# Patient Record
Sex: FEMALE | Race: WHITE | HISPANIC OR LATINO | Employment: UNEMPLOYED | URBAN - NONMETROPOLITAN AREA
[De-identification: names, ages, dates, MRNs, and addresses within clinical notes are randomized per-mention and may not be internally consistent; named-entity substitution may affect disease eponyms.]

---

## 2021-08-27 ENCOUNTER — HOSPITAL ENCOUNTER (INPATIENT)
Facility: HOSPITAL | Age: 81
LOS: 28 days | Discharge: HOME HEALTH | End: 2021-09-24
Attending: INTERNAL MEDICINE | Admitting: INTERNAL MEDICINE

## 2021-08-27 ENCOUNTER — PREP FOR SURGERY (OUTPATIENT)
Dept: OTHER | Facility: HOSPITAL | Age: 81
End: 2021-08-27

## 2021-08-27 DIAGNOSIS — I63.9 CVA (CEREBRAL VASCULAR ACCIDENT) (HCC): ICD-10-CM

## 2021-08-27 DIAGNOSIS — I63.312 CEREBROVASCULAR ACCIDENT (CVA) DUE TO THROMBOSIS OF LEFT MIDDLE CEREBRAL ARTERY (HCC): ICD-10-CM

## 2021-08-27 DIAGNOSIS — I63.9 CVA (CEREBRAL VASCULAR ACCIDENT) (HCC): Primary | ICD-10-CM

## 2021-08-27 LAB
GLUCOSE BLDC GLUCOMTR-MCNC: 205 MG/DL (ref 70–130)
GLUCOSE BLDC GLUCOMTR-MCNC: 261 MG/DL (ref 70–130)
GLUCOSE BLDC GLUCOMTR-MCNC: 294 MG/DL (ref 70–130)

## 2021-08-27 PROCEDURE — 82962 GLUCOSE BLOOD TEST: CPT

## 2021-08-27 PROCEDURE — 63710000001 INSULIN DETEMIR PER 5 UNITS: Performed by: INTERNAL MEDICINE

## 2021-08-27 PROCEDURE — 63710000001 INSULIN ASPART PER 5 UNITS: Performed by: INTERNAL MEDICINE

## 2021-08-27 RX ORDER — BISACODYL 10 MG
10 SUPPOSITORY, RECTAL RECTAL DAILY PRN
Status: CANCELLED | OUTPATIENT
Start: 2021-08-27

## 2021-08-27 RX ORDER — KETOTIFEN FUMARATE 0.35 MG/ML
1 SOLUTION/ DROPS OPHTHALMIC EVERY 8 HOURS
Status: CANCELLED | OUTPATIENT
Start: 2021-08-27

## 2021-08-27 RX ORDER — ASPIRIN 81 MG/1
81 TABLET, CHEWABLE ORAL DAILY
Status: DISPENSED | OUTPATIENT
Start: 2021-08-27 | End: 2021-09-01

## 2021-08-27 RX ORDER — ASCORBIC ACID 500 MG
500 TABLET ORAL DAILY
Status: CANCELLED | OUTPATIENT
Start: 2021-08-27

## 2021-08-27 RX ORDER — MULTIPLE VITAMINS W/ MINERALS TAB 9MG-400MCG
1 TAB ORAL DAILY
Status: DISCONTINUED | OUTPATIENT
Start: 2021-08-27 | End: 2021-09-24 | Stop reason: HOSPADM

## 2021-08-27 RX ORDER — CHOLECALCIFEROL (VITAMIN D3) 125 MCG
10 CAPSULE ORAL NIGHTLY
Status: CANCELLED | OUTPATIENT
Start: 2021-08-27

## 2021-08-27 RX ORDER — MULTIPLE VITAMINS W/ MINERALS TAB 9MG-400MCG
1 TAB ORAL DAILY
Status: CANCELLED | OUTPATIENT
Start: 2021-08-27

## 2021-08-27 RX ORDER — ONDANSETRON 4 MG/1
4 TABLET, FILM COATED ORAL EVERY 6 HOURS PRN
Status: CANCELLED | OUTPATIENT
Start: 2021-08-27

## 2021-08-27 RX ORDER — PANTOPRAZOLE SODIUM 40 MG/1
40 TABLET, DELAYED RELEASE ORAL
Status: DISCONTINUED | OUTPATIENT
Start: 2021-08-28 | End: 2021-09-24 | Stop reason: HOSPADM

## 2021-08-27 RX ORDER — AMOXICILLIN 250 MG
1 CAPSULE ORAL 2 TIMES DAILY
Status: CANCELLED | OUTPATIENT
Start: 2021-08-27

## 2021-08-27 RX ORDER — AMLODIPINE BESYLATE 10 MG/1
10 TABLET ORAL 2 TIMES DAILY
Status: CANCELLED | OUTPATIENT
Start: 2021-08-27

## 2021-08-27 RX ORDER — CHOLECALCIFEROL (VITAMIN D3) 125 MCG
10 CAPSULE ORAL NIGHTLY
Status: DISCONTINUED | OUTPATIENT
Start: 2021-08-27 | End: 2021-09-24 | Stop reason: HOSPADM

## 2021-08-27 RX ORDER — ATORVASTATIN CALCIUM 40 MG/1
80 TABLET, FILM COATED ORAL NIGHTLY
Status: CANCELLED | OUTPATIENT
Start: 2021-08-27

## 2021-08-27 RX ORDER — LEVOTHYROXINE SODIUM 0.05 MG/1
50 TABLET ORAL
Status: CANCELLED | OUTPATIENT
Start: 2021-08-27

## 2021-08-27 RX ORDER — CALCIUM CARBONATE 200(500)MG
1 TABLET,CHEWABLE ORAL 2 TIMES DAILY PRN
Status: CANCELLED | OUTPATIENT
Start: 2021-08-27

## 2021-08-27 RX ORDER — ATORVASTATIN CALCIUM 40 MG/1
80 TABLET, FILM COATED ORAL NIGHTLY
Status: DISCONTINUED | OUTPATIENT
Start: 2021-08-27 | End: 2021-09-24 | Stop reason: HOSPADM

## 2021-08-27 RX ORDER — CARBOXYMETHYLCELLULOSE SODIUM 5 MG/ML
2 SOLUTION/ DROPS OPHTHALMIC 3 TIMES DAILY PRN
Status: DISCONTINUED | OUTPATIENT
Start: 2021-08-27 | End: 2021-09-24 | Stop reason: HOSPADM

## 2021-08-27 RX ORDER — CARBOXYMETHYLCELLULOSE SODIUM 5 MG/ML
2 SOLUTION/ DROPS OPHTHALMIC 3 TIMES DAILY PRN
Status: CANCELLED | OUTPATIENT
Start: 2021-08-27

## 2021-08-27 RX ORDER — INSULIN DETEMIR 100 [IU]/ML
28 INJECTION, SOLUTION SUBCUTANEOUS
COMMUNITY
End: 2021-09-24 | Stop reason: HOSPADM

## 2021-08-27 RX ORDER — AMLODIPINE BESYLATE 10 MG/1
10 TABLET ORAL 2 TIMES DAILY
Status: DISCONTINUED | OUTPATIENT
Start: 2021-08-27 | End: 2021-09-24 | Stop reason: HOSPADM

## 2021-08-27 RX ORDER — CLOPIDOGREL BISULFATE 75 MG/1
75 TABLET ORAL DAILY
Status: CANCELLED | OUTPATIENT
Start: 2021-08-27 | End: 2021-09-01

## 2021-08-27 RX ORDER — PHENOL 1.4 %
10 AEROSOL, SPRAY (ML) MUCOUS MEMBRANE
Status: ON HOLD | COMMUNITY
End: 2021-09-24 | Stop reason: SDUPTHER

## 2021-08-27 RX ORDER — HYDRALAZINE HYDROCHLORIDE 25 MG/1
25 TABLET, FILM COATED ORAL 3 TIMES DAILY
COMMUNITY
End: 2021-09-24 | Stop reason: HOSPADM

## 2021-08-27 RX ORDER — CHLORTHALIDONE 50 MG/1
25 TABLET ORAL DAILY
Status: CANCELLED | OUTPATIENT
Start: 2021-08-27

## 2021-08-27 RX ORDER — GLIPIZIDE 5 MG/1
5 TABLET ORAL 3 TIMES DAILY
COMMUNITY
End: 2021-09-24 | Stop reason: HOSPADM

## 2021-08-27 RX ORDER — DEXTROSE MONOHYDRATE 25 G/50ML
25 INJECTION, SOLUTION INTRAVENOUS
Status: CANCELLED | OUTPATIENT
Start: 2021-08-27

## 2021-08-27 RX ORDER — NICOTINE POLACRILEX 4 MG
15 LOZENGE BUCCAL
Status: CANCELLED | OUTPATIENT
Start: 2021-08-27

## 2021-08-27 RX ORDER — ASPIRIN 81 MG/1
81 TABLET ORAL DAILY
COMMUNITY
End: 2021-09-24 | Stop reason: HOSPADM

## 2021-08-27 RX ORDER — LANOLIN ALCOHOL/MO/W.PET/CERES
1000 CREAM (GRAM) TOPICAL DAILY
Status: CANCELLED | OUTPATIENT
Start: 2021-08-27

## 2021-08-27 RX ORDER — ASCORBIC ACID 500 MG
500 TABLET ORAL DAILY
Status: DISCONTINUED | OUTPATIENT
Start: 2021-08-27 | End: 2021-09-24 | Stop reason: HOSPADM

## 2021-08-27 RX ORDER — ONDANSETRON 4 MG/1
4 TABLET, FILM COATED ORAL EVERY 6 HOURS PRN
Status: DISCONTINUED | OUTPATIENT
Start: 2021-08-27 | End: 2021-09-24 | Stop reason: HOSPADM

## 2021-08-27 RX ORDER — ROSUVASTATIN CALCIUM 20 MG/1
20 TABLET, COATED ORAL DAILY
COMMUNITY
End: 2021-09-24 | Stop reason: HOSPADM

## 2021-08-27 RX ORDER — LANOLIN ALCOHOL/MO/W.PET/CERES
1000 CREAM (GRAM) TOPICAL DAILY
Status: DISCONTINUED | OUTPATIENT
Start: 2021-08-27 | End: 2021-09-24 | Stop reason: HOSPADM

## 2021-08-27 RX ORDER — CARVEDILOL 6.25 MG/1
12.5 TABLET ORAL 2 TIMES DAILY WITH MEALS
Status: CANCELLED | OUTPATIENT
Start: 2021-08-27

## 2021-08-27 RX ORDER — NICOTINE POLACRILEX 4 MG
15 LOZENGE BUCCAL
Status: DISCONTINUED | OUTPATIENT
Start: 2021-08-27 | End: 2021-09-24 | Stop reason: HOSPADM

## 2021-08-27 RX ORDER — KETOTIFEN FUMARATE 0.35 MG/ML
1 SOLUTION/ DROPS OPHTHALMIC 3 TIMES DAILY
Status: DISCONTINUED | OUTPATIENT
Start: 2021-08-27 | End: 2021-09-24 | Stop reason: HOSPADM

## 2021-08-27 RX ORDER — OMEPRAZOLE 20 MG/1
20 CAPSULE, DELAYED RELEASE ORAL DAILY
COMMUNITY
End: 2021-09-24 | Stop reason: HOSPADM

## 2021-08-27 RX ORDER — CLOPIDOGREL BISULFATE 75 MG/1
75 TABLET ORAL DAILY
Status: DISPENSED | OUTPATIENT
Start: 2021-08-27 | End: 2021-09-01

## 2021-08-27 RX ORDER — NAPROXEN SODIUM 220 MG
220 TABLET ORAL NIGHTLY PRN
COMMUNITY
End: 2021-09-24 | Stop reason: HOSPADM

## 2021-08-27 RX ORDER — LORATADINE 10 MG/1
10 TABLET ORAL DAILY
COMMUNITY
End: 2021-09-24 | Stop reason: HOSPADM

## 2021-08-27 RX ORDER — AMLODIPINE BESYLATE 10 MG/1
10 TABLET ORAL 2 TIMES DAILY
Status: ON HOLD | COMMUNITY
End: 2021-09-24 | Stop reason: SDUPTHER

## 2021-08-27 RX ORDER — LEVOTHYROXINE SODIUM 0.05 MG/1
50 TABLET ORAL
Status: DISCONTINUED | OUTPATIENT
Start: 2021-08-28 | End: 2021-09-24 | Stop reason: HOSPADM

## 2021-08-27 RX ORDER — LEVOTHYROXINE SODIUM 0.05 MG/1
50 TABLET ORAL DAILY
Status: ON HOLD | COMMUNITY
End: 2021-09-24 | Stop reason: SDUPTHER

## 2021-08-27 RX ORDER — POLYETHYLENE GLYCOL 3350 17 G/17G
17 POWDER, FOR SOLUTION ORAL DAILY PRN
Status: CANCELLED | OUTPATIENT
Start: 2021-08-27

## 2021-08-27 RX ORDER — ACETAMINOPHEN 325 MG/1
650 TABLET ORAL EVERY 4 HOURS PRN
Status: CANCELLED | OUTPATIENT
Start: 2021-08-27

## 2021-08-27 RX ORDER — ASPIRIN 81 MG/1
81 TABLET, CHEWABLE ORAL DAILY
Status: CANCELLED | OUTPATIENT
Start: 2021-08-27 | End: 2021-09-01

## 2021-08-27 RX ORDER — CHLORTHALIDONE 50 MG/1
25 TABLET ORAL DAILY
Status: DISCONTINUED | OUTPATIENT
Start: 2021-08-27 | End: 2021-09-24 | Stop reason: HOSPADM

## 2021-08-27 RX ORDER — DEXTROSE MONOHYDRATE 25 G/50ML
25 INJECTION, SOLUTION INTRAVENOUS
Status: DISCONTINUED | OUTPATIENT
Start: 2021-08-27 | End: 2021-09-24 | Stop reason: HOSPADM

## 2021-08-27 RX ORDER — CALCIUM CARBONATE 200(500)MG
1 TABLET,CHEWABLE ORAL 2 TIMES DAILY PRN
Status: DISCONTINUED | OUTPATIENT
Start: 2021-08-27 | End: 2021-09-24 | Stop reason: HOSPADM

## 2021-08-27 RX ORDER — AMOXICILLIN 250 MG
1 CAPSULE ORAL 2 TIMES DAILY
Status: DISCONTINUED | OUTPATIENT
Start: 2021-08-27 | End: 2021-09-24 | Stop reason: HOSPADM

## 2021-08-27 RX ORDER — BISACODYL 10 MG
10 SUPPOSITORY, RECTAL RECTAL DAILY PRN
Status: DISCONTINUED | OUTPATIENT
Start: 2021-08-27 | End: 2021-09-24 | Stop reason: HOSPADM

## 2021-08-27 RX ORDER — ACETAMINOPHEN 325 MG/1
650 TABLET ORAL EVERY 4 HOURS PRN
Status: DISCONTINUED | OUTPATIENT
Start: 2021-08-27 | End: 2021-09-24 | Stop reason: HOSPADM

## 2021-08-27 RX ORDER — PANTOPRAZOLE SODIUM 40 MG/1
40 TABLET, DELAYED RELEASE ORAL
Status: CANCELLED | OUTPATIENT
Start: 2021-08-27

## 2021-08-27 RX ORDER — CARVEDILOL 6.25 MG/1
12.5 TABLET ORAL 2 TIMES DAILY WITH MEALS
Status: DISCONTINUED | OUTPATIENT
Start: 2021-08-27 | End: 2021-09-24 | Stop reason: HOSPADM

## 2021-08-27 RX ORDER — POLYETHYLENE GLYCOL 3350 17 G/17G
17 POWDER, FOR SOLUTION ORAL DAILY PRN
Status: DISCONTINUED | OUTPATIENT
Start: 2021-08-27 | End: 2021-09-24 | Stop reason: HOSPADM

## 2021-08-27 RX ORDER — DOCUSATE SODIUM 100 MG/1
100 CAPSULE, LIQUID FILLED ORAL 2 TIMES DAILY
Status: ON HOLD | COMMUNITY
End: 2021-08-27

## 2021-08-27 RX ADMIN — INSULIN ASPART 3 UNITS: 100 INJECTION, SOLUTION INTRAVENOUS; SUBCUTANEOUS at 13:29

## 2021-08-27 RX ADMIN — INSULIN ASPART 4 UNITS: 100 INJECTION, SOLUTION INTRAVENOUS; SUBCUTANEOUS at 17:19

## 2021-08-27 RX ADMIN — ATORVASTATIN CALCIUM 80 MG: 40 TABLET, FILM COATED ORAL at 21:51

## 2021-08-27 RX ADMIN — INSULIN DETEMIR 20 UNITS: 100 INJECTION, SOLUTION SUBCUTANEOUS at 21:35

## 2021-08-27 RX ADMIN — DOCUSATE SODIUM 50 MG AND SENNOSIDES 8.6 MG 1 TABLET: 8.6; 5 TABLET, FILM COATED ORAL at 21:51

## 2021-08-27 RX ADMIN — KETOTIFEN FUMARATE 1 DROP: 0.35 SOLUTION/ DROPS OPHTHALMIC at 13:29

## 2021-08-27 RX ADMIN — CARVEDILOL 12.5 MG: 6.25 TABLET, FILM COATED ORAL at 17:19

## 2021-08-27 RX ADMIN — KETOTIFEN FUMARATE 1 DROP: 0.35 SOLUTION/ DROPS OPHTHALMIC at 21:51

## 2021-08-27 RX ADMIN — AMLODIPINE BESYLATE 10 MG: 10 TABLET ORAL at 21:51

## 2021-08-27 RX ADMIN — Medication 10 MG: at 21:50

## 2021-08-28 ENCOUNTER — APPOINTMENT (OUTPATIENT)
Dept: GENERAL RADIOLOGY | Facility: HOSPITAL | Age: 81
End: 2021-08-28

## 2021-08-28 LAB
ALBUMIN SERPL-MCNC: 2.88 G/DL (ref 3.5–5.2)
ALBUMIN/GLOB SERPL: 0.9 G/DL
ALP SERPL-CCNC: 89 U/L (ref 39–117)
ALT SERPL W P-5'-P-CCNC: 28 U/L (ref 1–33)
ANION GAP SERPL CALCULATED.3IONS-SCNC: 9.2 MMOL/L (ref 5–15)
AST SERPL-CCNC: 26 U/L (ref 1–32)
BASOPHILS # BLD AUTO: 0.1 10*3/MM3 (ref 0–0.2)
BASOPHILS NFR BLD AUTO: 1 % (ref 0–1.5)
BILIRUB SERPL-MCNC: 0.2 MG/DL (ref 0–1.2)
BUN SERPL-MCNC: 22 MG/DL (ref 8–23)
BUN/CREAT SERPL: 21.2 (ref 7–25)
CALCIUM SPEC-SCNC: 8.9 MG/DL (ref 8.6–10.5)
CHLORIDE SERPL-SCNC: 100 MMOL/L (ref 98–107)
CO2 SERPL-SCNC: 25.8 MMOL/L (ref 22–29)
CREAT SERPL-MCNC: 1.04 MG/DL (ref 0.57–1)
DEPRECATED RDW RBC AUTO: 44.4 FL (ref 37–54)
EOSINOPHIL # BLD AUTO: 0.53 10*3/MM3 (ref 0–0.4)
EOSINOPHIL NFR BLD AUTO: 5.2 % (ref 0.3–6.2)
ERYTHROCYTE [DISTWIDTH] IN BLOOD BY AUTOMATED COUNT: 14 % (ref 12.3–15.4)
GFR SERPL CREATININE-BSD FRML MDRD: 51 ML/MIN/1.73
GLOBULIN UR ELPH-MCNC: 3.1 GM/DL
GLUCOSE BLDC GLUCOMTR-MCNC: 161 MG/DL (ref 70–130)
GLUCOSE BLDC GLUCOMTR-MCNC: 237 MG/DL (ref 70–130)
GLUCOSE BLDC GLUCOMTR-MCNC: 264 MG/DL (ref 70–130)
GLUCOSE BLDC GLUCOMTR-MCNC: 304 MG/DL (ref 70–130)
GLUCOSE SERPL-MCNC: 299 MG/DL (ref 65–99)
HCT VFR BLD AUTO: 39.2 % (ref 34–46.6)
HGB BLD-MCNC: 13.2 G/DL (ref 12–15.9)
IMM GRANULOCYTES # BLD AUTO: 0.06 10*3/MM3 (ref 0–0.05)
IMM GRANULOCYTES NFR BLD AUTO: 0.6 % (ref 0–0.5)
LYMPHOCYTES # BLD AUTO: 2.33 10*3/MM3 (ref 0.7–3.1)
LYMPHOCYTES NFR BLD AUTO: 22.9 % (ref 19.6–45.3)
MAGNESIUM SERPL-MCNC: 2.4 MG/DL (ref 1.6–2.4)
MCH RBC QN AUTO: 30 PG (ref 26.6–33)
MCHC RBC AUTO-ENTMCNC: 33.7 G/DL (ref 31.5–35.7)
MCV RBC AUTO: 89.1 FL (ref 79–97)
MONOCYTES # BLD AUTO: 0.9 10*3/MM3 (ref 0.1–0.9)
MONOCYTES NFR BLD AUTO: 8.8 % (ref 5–12)
NEUTROPHILS NFR BLD AUTO: 6.27 10*3/MM3 (ref 1.7–7)
NEUTROPHILS NFR BLD AUTO: 61.5 % (ref 42.7–76)
NRBC BLD AUTO-RTO: 0 /100 WBC (ref 0–0.2)
PLATELET # BLD AUTO: 260 10*3/MM3 (ref 140–450)
PMV BLD AUTO: 10.4 FL (ref 6–12)
POTASSIUM SERPL-SCNC: 4.3 MMOL/L (ref 3.5–5.2)
PROT SERPL-MCNC: 6 G/DL (ref 6–8.5)
RBC # BLD AUTO: 4.4 10*6/MM3 (ref 3.77–5.28)
SODIUM SERPL-SCNC: 135 MMOL/L (ref 136–145)
WBC # BLD AUTO: 10.19 10*3/MM3 (ref 3.4–10.8)

## 2021-08-28 PROCEDURE — 97162 PT EVAL MOD COMPLEX 30 MIN: CPT

## 2021-08-28 PROCEDURE — 63710000001 INSULIN DETEMIR PER 5 UNITS: Performed by: INTERNAL MEDICINE

## 2021-08-28 PROCEDURE — 97530 THERAPEUTIC ACTIVITIES: CPT

## 2021-08-28 PROCEDURE — 83735 ASSAY OF MAGNESIUM: CPT | Performed by: INTERNAL MEDICINE

## 2021-08-28 PROCEDURE — 97166 OT EVAL MOD COMPLEX 45 MIN: CPT

## 2021-08-28 PROCEDURE — 63710000001 INSULIN ASPART PER 5 UNITS: Performed by: INTERNAL MEDICINE

## 2021-08-28 PROCEDURE — 74230 X-RAY XM SWLNG FUNCJ C+: CPT | Performed by: RADIOLOGY

## 2021-08-28 PROCEDURE — 85025 COMPLETE CBC W/AUTO DIFF WBC: CPT | Performed by: INTERNAL MEDICINE

## 2021-08-28 PROCEDURE — 74230 X-RAY XM SWLNG FUNCJ C+: CPT

## 2021-08-28 PROCEDURE — 82962 GLUCOSE BLOOD TEST: CPT

## 2021-08-28 PROCEDURE — 80053 COMPREHEN METABOLIC PANEL: CPT | Performed by: INTERNAL MEDICINE

## 2021-08-28 PROCEDURE — 97112 NEUROMUSCULAR REEDUCATION: CPT

## 2021-08-28 PROCEDURE — 97110 THERAPEUTIC EXERCISES: CPT

## 2021-08-28 PROCEDURE — 92611 MOTION FLUOROSCOPY/SWALLOW: CPT

## 2021-08-28 RX ORDER — BISACODYL 5 MG/1
10 TABLET, DELAYED RELEASE ORAL DAILY PRN
Status: DISCONTINUED | OUTPATIENT
Start: 2021-08-28 | End: 2021-09-24 | Stop reason: HOSPADM

## 2021-08-28 RX ADMIN — ATORVASTATIN CALCIUM 80 MG: 40 TABLET, FILM COATED ORAL at 21:32

## 2021-08-28 RX ADMIN — OXYCODONE HYDROCHLORIDE AND ACETAMINOPHEN 500 MG: 500 TABLET ORAL at 09:42

## 2021-08-28 RX ADMIN — CLOPIDOGREL 75 MG: 75 TABLET, FILM COATED ORAL at 09:42

## 2021-08-28 RX ADMIN — KETOTIFEN FUMARATE 1 DROP: 0.35 SOLUTION/ DROPS OPHTHALMIC at 21:33

## 2021-08-28 RX ADMIN — ASPIRIN 81 MG: 81 TABLET, CHEWABLE ORAL at 09:42

## 2021-08-28 RX ADMIN — INSULIN ASPART 5 UNITS: 100 INJECTION, SOLUTION INTRAVENOUS; SUBCUTANEOUS at 11:48

## 2021-08-28 RX ADMIN — INSULIN ASPART 2 UNITS: 100 INJECTION, SOLUTION INTRAVENOUS; SUBCUTANEOUS at 09:41

## 2021-08-28 RX ADMIN — INSULIN ASPART 4 UNITS: 100 INJECTION, SOLUTION INTRAVENOUS; SUBCUTANEOUS at 16:59

## 2021-08-28 RX ADMIN — INSULIN DETEMIR 20 UNITS: 100 INJECTION, SOLUTION SUBCUTANEOUS at 09:42

## 2021-08-28 RX ADMIN — CARVEDILOL 12.5 MG: 6.25 TABLET, FILM COATED ORAL at 17:14

## 2021-08-28 RX ADMIN — PANTOPRAZOLE SODIUM 40 MG: 40 TABLET, DELAYED RELEASE ORAL at 05:58

## 2021-08-28 RX ADMIN — Medication 1000 MCG: at 09:42

## 2021-08-28 RX ADMIN — CHLORTHALIDONE 25 MG: 50 TABLET ORAL at 09:42

## 2021-08-28 RX ADMIN — KETOTIFEN FUMARATE 1 DROP: 0.35 SOLUTION/ DROPS OPHTHALMIC at 09:42

## 2021-08-28 RX ADMIN — INSULIN DETEMIR 20 UNITS: 100 INJECTION, SOLUTION SUBCUTANEOUS at 21:33

## 2021-08-28 RX ADMIN — Medication 1 TABLET: at 09:42

## 2021-08-28 RX ADMIN — LEVOTHYROXINE SODIUM 50 MCG: 50 TABLET ORAL at 05:58

## 2021-08-28 RX ADMIN — DOCUSATE SODIUM 50 MG AND SENNOSIDES 8.6 MG 1 TABLET: 8.6; 5 TABLET, FILM COATED ORAL at 21:32

## 2021-08-28 RX ADMIN — Medication 10 MG: at 21:32

## 2021-08-28 RX ADMIN — AMLODIPINE BESYLATE 10 MG: 10 TABLET ORAL at 21:32

## 2021-08-28 RX ADMIN — LINAGLIPTIN 5 MG: 5 TABLET, FILM COATED ORAL at 09:55

## 2021-08-28 RX ADMIN — DOCUSATE SODIUM 50 MG AND SENNOSIDES 8.6 MG 1 TABLET: 8.6; 5 TABLET, FILM COATED ORAL at 09:42

## 2021-08-28 RX ADMIN — KETOTIFEN FUMARATE 1 DROP: 0.35 SOLUTION/ DROPS OPHTHALMIC at 16:59

## 2021-08-29 LAB
GLUCOSE BLDC GLUCOMTR-MCNC: 119 MG/DL (ref 70–130)
GLUCOSE BLDC GLUCOMTR-MCNC: 225 MG/DL (ref 70–130)
GLUCOSE BLDC GLUCOMTR-MCNC: 260 MG/DL (ref 70–130)
GLUCOSE BLDC GLUCOMTR-MCNC: 269 MG/DL (ref 70–130)

## 2021-08-29 PROCEDURE — 63710000001 INSULIN ASPART PER 5 UNITS: Performed by: INTERNAL MEDICINE

## 2021-08-29 PROCEDURE — 82962 GLUCOSE BLOOD TEST: CPT

## 2021-08-29 PROCEDURE — 63710000001 INSULIN DETEMIR PER 5 UNITS: Performed by: INTERNAL MEDICINE

## 2021-08-29 RX ADMIN — LEVOTHYROXINE SODIUM 50 MCG: 50 TABLET ORAL at 06:02

## 2021-08-29 RX ADMIN — LINAGLIPTIN 5 MG: 5 TABLET, FILM COATED ORAL at 09:43

## 2021-08-29 RX ADMIN — Medication 10 MG: at 21:01

## 2021-08-29 RX ADMIN — OXYCODONE HYDROCHLORIDE AND ACETAMINOPHEN 500 MG: 500 TABLET ORAL at 09:44

## 2021-08-29 RX ADMIN — KETOTIFEN FUMARATE 1 DROP: 0.35 SOLUTION/ DROPS OPHTHALMIC at 17:58

## 2021-08-29 RX ADMIN — ATORVASTATIN CALCIUM 80 MG: 40 TABLET, FILM COATED ORAL at 21:01

## 2021-08-29 RX ADMIN — INSULIN DETEMIR 20 UNITS: 100 INJECTION, SOLUTION SUBCUTANEOUS at 09:48

## 2021-08-29 RX ADMIN — Medication 1 TABLET: at 09:43

## 2021-08-29 RX ADMIN — KETOTIFEN FUMARATE 1 DROP: 0.35 SOLUTION/ DROPS OPHTHALMIC at 09:46

## 2021-08-29 RX ADMIN — PANTOPRAZOLE SODIUM 40 MG: 40 TABLET, DELAYED RELEASE ORAL at 06:02

## 2021-08-29 RX ADMIN — KETOTIFEN FUMARATE 1 DROP: 0.35 SOLUTION/ DROPS OPHTHALMIC at 21:01

## 2021-08-29 RX ADMIN — BISACODYL 10 MG: 5 TABLET ORAL at 06:02

## 2021-08-29 RX ADMIN — Medication 1000 MCG: at 09:43

## 2021-08-29 RX ADMIN — CLOPIDOGREL 75 MG: 75 TABLET, FILM COATED ORAL at 09:43

## 2021-08-29 RX ADMIN — DOCUSATE SODIUM 50 MG AND SENNOSIDES 8.6 MG 1 TABLET: 8.6; 5 TABLET, FILM COATED ORAL at 09:43

## 2021-08-29 RX ADMIN — INSULIN ASPART 3 UNITS: 100 INJECTION, SOLUTION INTRAVENOUS; SUBCUTANEOUS at 17:58

## 2021-08-29 RX ADMIN — INSULIN ASPART 4 UNITS: 100 INJECTION, SOLUTION INTRAVENOUS; SUBCUTANEOUS at 12:38

## 2021-08-29 RX ADMIN — ASPIRIN 81 MG: 81 TABLET, CHEWABLE ORAL at 09:44

## 2021-08-29 RX ADMIN — INSULIN DETEMIR 20 UNITS: 100 INJECTION, SOLUTION SUBCUTANEOUS at 21:47

## 2021-08-29 RX ADMIN — CHLORTHALIDONE 25 MG: 50 TABLET ORAL at 09:43

## 2021-08-29 RX ADMIN — DOCUSATE SODIUM 50 MG AND SENNOSIDES 8.6 MG 1 TABLET: 8.6; 5 TABLET, FILM COATED ORAL at 20:59

## 2021-08-30 LAB
GLUCOSE BLDC GLUCOMTR-MCNC: 169 MG/DL (ref 70–130)
GLUCOSE BLDC GLUCOMTR-MCNC: 171 MG/DL (ref 70–130)
GLUCOSE BLDC GLUCOMTR-MCNC: 224 MG/DL (ref 70–130)
GLUCOSE BLDC GLUCOMTR-MCNC: 314 MG/DL (ref 70–130)

## 2021-08-30 PROCEDURE — 63710000001 INSULIN ASPART PER 5 UNITS: Performed by: INTERNAL MEDICINE

## 2021-08-30 PROCEDURE — 97530 THERAPEUTIC ACTIVITIES: CPT

## 2021-08-30 PROCEDURE — 97110 THERAPEUTIC EXERCISES: CPT

## 2021-08-30 PROCEDURE — 97535 SELF CARE MNGMENT TRAINING: CPT | Performed by: OCCUPATIONAL THERAPIST

## 2021-08-30 PROCEDURE — 97112 NEUROMUSCULAR REEDUCATION: CPT | Performed by: OCCUPATIONAL THERAPIST

## 2021-08-30 PROCEDURE — 92523 SPEECH SOUND LANG COMPREHEN: CPT

## 2021-08-30 PROCEDURE — 97112 NEUROMUSCULAR REEDUCATION: CPT

## 2021-08-30 PROCEDURE — 63710000001 INSULIN DETEMIR PER 5 UNITS: Performed by: INTERNAL MEDICINE

## 2021-08-30 PROCEDURE — 97110 THERAPEUTIC EXERCISES: CPT | Performed by: OCCUPATIONAL THERAPIST

## 2021-08-30 PROCEDURE — 92526 ORAL FUNCTION THERAPY: CPT

## 2021-08-30 PROCEDURE — 82962 GLUCOSE BLOOD TEST: CPT

## 2021-08-30 RX ORDER — SERTRALINE HYDROCHLORIDE 25 MG/1
25 TABLET, FILM COATED ORAL DAILY
Status: DISCONTINUED | OUTPATIENT
Start: 2021-08-30 | End: 2021-09-08

## 2021-08-30 RX ADMIN — SERTRALINE 25 MG: 25 TABLET, FILM COATED ORAL at 17:18

## 2021-08-30 RX ADMIN — Medication 1 TABLET: at 09:31

## 2021-08-30 RX ADMIN — Medication 1000 MCG: at 09:31

## 2021-08-30 RX ADMIN — ASPIRIN 81 MG: 81 TABLET, CHEWABLE ORAL at 09:30

## 2021-08-30 RX ADMIN — KETOTIFEN FUMARATE 1 DROP: 0.35 SOLUTION/ DROPS OPHTHALMIC at 17:18

## 2021-08-30 RX ADMIN — DOCUSATE SODIUM 50 MG AND SENNOSIDES 8.6 MG 1 TABLET: 8.6; 5 TABLET, FILM COATED ORAL at 20:31

## 2021-08-30 RX ADMIN — MAGNESIUM HYDROXIDE 10 ML: 2400 SUSPENSION ORAL at 13:56

## 2021-08-30 RX ADMIN — KETOTIFEN FUMARATE 1 DROP: 0.35 SOLUTION/ DROPS OPHTHALMIC at 09:31

## 2021-08-30 RX ADMIN — CHLORTHALIDONE 25 MG: 50 TABLET ORAL at 09:30

## 2021-08-30 RX ADMIN — PANTOPRAZOLE SODIUM 40 MG: 40 TABLET, DELAYED RELEASE ORAL at 05:01

## 2021-08-30 RX ADMIN — Medication 10 MG: at 20:31

## 2021-08-30 RX ADMIN — OXYCODONE HYDROCHLORIDE AND ACETAMINOPHEN 500 MG: 500 TABLET ORAL at 09:30

## 2021-08-30 RX ADMIN — LEVOTHYROXINE SODIUM 50 MCG: 50 TABLET ORAL at 05:01

## 2021-08-30 RX ADMIN — KETOTIFEN FUMARATE 1 DROP: 0.35 SOLUTION/ DROPS OPHTHALMIC at 20:31

## 2021-08-30 RX ADMIN — INSULIN DETEMIR 20 UNITS: 100 INJECTION, SOLUTION SUBCUTANEOUS at 21:43

## 2021-08-30 RX ADMIN — LINAGLIPTIN 5 MG: 5 TABLET, FILM COATED ORAL at 09:30

## 2021-08-30 RX ADMIN — BISACODYL 10 MG: 5 TABLET ORAL at 20:28

## 2021-08-30 RX ADMIN — INSULIN ASPART 2 UNITS: 100 INJECTION, SOLUTION INTRAVENOUS; SUBCUTANEOUS at 09:32

## 2021-08-30 RX ADMIN — INSULIN ASPART 3 UNITS: 100 INJECTION, SOLUTION INTRAVENOUS; SUBCUTANEOUS at 13:55

## 2021-08-30 RX ADMIN — CLOPIDOGREL 75 MG: 75 TABLET, FILM COATED ORAL at 09:30

## 2021-08-30 RX ADMIN — INSULIN DETEMIR 20 UNITS: 100 INJECTION, SOLUTION SUBCUTANEOUS at 09:32

## 2021-08-30 RX ADMIN — INSULIN ASPART 2 UNITS: 100 INJECTION, SOLUTION INTRAVENOUS; SUBCUTANEOUS at 17:18

## 2021-08-30 RX ADMIN — ATORVASTATIN CALCIUM 80 MG: 40 TABLET, FILM COATED ORAL at 20:31

## 2021-08-30 RX ADMIN — DOCUSATE SODIUM 50 MG AND SENNOSIDES 8.6 MG 1 TABLET: 8.6; 5 TABLET, FILM COATED ORAL at 09:31

## 2021-08-31 LAB
GLUCOSE BLDC GLUCOMTR-MCNC: 158 MG/DL (ref 70–130)
GLUCOSE BLDC GLUCOMTR-MCNC: 164 MG/DL (ref 70–130)
GLUCOSE BLDC GLUCOMTR-MCNC: 243 MG/DL (ref 70–130)
GLUCOSE BLDC GLUCOMTR-MCNC: 340 MG/DL (ref 70–130)

## 2021-08-31 PROCEDURE — 97530 THERAPEUTIC ACTIVITIES: CPT

## 2021-08-31 PROCEDURE — 97112 NEUROMUSCULAR REEDUCATION: CPT | Performed by: OCCUPATIONAL THERAPIST

## 2021-08-31 PROCEDURE — 97112 NEUROMUSCULAR REEDUCATION: CPT

## 2021-08-31 PROCEDURE — 97110 THERAPEUTIC EXERCISES: CPT

## 2021-08-31 PROCEDURE — 63710000001 INSULIN ASPART PER 5 UNITS: Performed by: INTERNAL MEDICINE

## 2021-08-31 PROCEDURE — 97535 SELF CARE MNGMENT TRAINING: CPT

## 2021-08-31 PROCEDURE — 97032 APPL MODALITY 1+ESTIM EA 15: CPT

## 2021-08-31 PROCEDURE — 82962 GLUCOSE BLOOD TEST: CPT

## 2021-08-31 PROCEDURE — 63710000001 INSULIN DETEMIR PER 5 UNITS: Performed by: INTERNAL MEDICINE

## 2021-08-31 PROCEDURE — 92526 ORAL FUNCTION THERAPY: CPT

## 2021-08-31 PROCEDURE — 92507 TX SP LANG VOICE COMM INDIV: CPT

## 2021-08-31 RX ADMIN — SERTRALINE 25 MG: 25 TABLET, FILM COATED ORAL at 09:34

## 2021-08-31 RX ADMIN — KETOTIFEN FUMARATE 1 DROP: 0.35 SOLUTION/ DROPS OPHTHALMIC at 17:12

## 2021-08-31 RX ADMIN — CLOPIDOGREL 75 MG: 75 TABLET, FILM COATED ORAL at 09:31

## 2021-08-31 RX ADMIN — CHLORTHALIDONE 25 MG: 50 TABLET ORAL at 09:31

## 2021-08-31 RX ADMIN — DOCUSATE SODIUM 50 MG AND SENNOSIDES 8.6 MG 1 TABLET: 8.6; 5 TABLET, FILM COATED ORAL at 09:34

## 2021-08-31 RX ADMIN — INSULIN ASPART 2 UNITS: 100 INJECTION, SOLUTION INTRAVENOUS; SUBCUTANEOUS at 09:34

## 2021-08-31 RX ADMIN — ATORVASTATIN CALCIUM 80 MG: 40 TABLET, FILM COATED ORAL at 20:37

## 2021-08-31 RX ADMIN — INSULIN DETEMIR 20 UNITS: 100 INJECTION, SOLUTION SUBCUTANEOUS at 21:25

## 2021-08-31 RX ADMIN — LINAGLIPTIN 5 MG: 5 TABLET, FILM COATED ORAL at 09:33

## 2021-08-31 RX ADMIN — KETOTIFEN FUMARATE 1 DROP: 0.35 SOLUTION/ DROPS OPHTHALMIC at 09:34

## 2021-08-31 RX ADMIN — PANTOPRAZOLE SODIUM 40 MG: 40 TABLET, DELAYED RELEASE ORAL at 05:20

## 2021-08-31 RX ADMIN — LEVOTHYROXINE SODIUM 50 MCG: 50 TABLET ORAL at 05:20

## 2021-08-31 RX ADMIN — INSULIN DETEMIR 20 UNITS: 100 INJECTION, SOLUTION SUBCUTANEOUS at 09:35

## 2021-08-31 RX ADMIN — ASPIRIN 81 MG: 81 TABLET, CHEWABLE ORAL at 09:31

## 2021-08-31 RX ADMIN — KETOTIFEN FUMARATE 1 DROP: 0.35 SOLUTION/ DROPS OPHTHALMIC at 20:37

## 2021-08-31 RX ADMIN — INSULIN ASPART 2 UNITS: 100 INJECTION, SOLUTION INTRAVENOUS; SUBCUTANEOUS at 17:15

## 2021-08-31 RX ADMIN — INSULIN ASPART 5 UNITS: 100 INJECTION, SOLUTION INTRAVENOUS; SUBCUTANEOUS at 11:56

## 2021-08-31 RX ADMIN — Medication 1 TABLET: at 09:34

## 2021-08-31 RX ADMIN — OXYCODONE HYDROCHLORIDE AND ACETAMINOPHEN 500 MG: 500 TABLET ORAL at 09:31

## 2021-08-31 RX ADMIN — Medication 1000 MCG: at 09:34

## 2021-08-31 RX ADMIN — Medication 10 MG: at 20:37

## 2021-09-01 LAB
ALBUMIN SERPL-MCNC: 3.21 G/DL (ref 3.5–5.2)
ALBUMIN/GLOB SERPL: 1.1 G/DL
ALP SERPL-CCNC: 93 U/L (ref 39–117)
ALT SERPL W P-5'-P-CCNC: 18 U/L (ref 1–33)
ANION GAP SERPL CALCULATED.3IONS-SCNC: 8.7 MMOL/L (ref 5–15)
AST SERPL-CCNC: 16 U/L (ref 1–32)
BASOPHILS # BLD AUTO: 0.08 10*3/MM3 (ref 0–0.2)
BASOPHILS NFR BLD AUTO: 0.7 % (ref 0–1.5)
BILIRUB SERPL-MCNC: 0.3 MG/DL (ref 0–1.2)
BUN SERPL-MCNC: 29 MG/DL (ref 8–23)
BUN/CREAT SERPL: 22.1 (ref 7–25)
CALCIUM SPEC-SCNC: 9.5 MG/DL (ref 8.6–10.5)
CHLORIDE SERPL-SCNC: 97 MMOL/L (ref 98–107)
CO2 SERPL-SCNC: 30.3 MMOL/L (ref 22–29)
CREAT SERPL-MCNC: 1.31 MG/DL (ref 0.57–1)
DEPRECATED RDW RBC AUTO: 46.1 FL (ref 37–54)
EOSINOPHIL # BLD AUTO: 0.43 10*3/MM3 (ref 0–0.4)
EOSINOPHIL NFR BLD AUTO: 3.9 % (ref 0.3–6.2)
ERYTHROCYTE [DISTWIDTH] IN BLOOD BY AUTOMATED COUNT: 14.9 % (ref 12.3–15.4)
GFR SERPL CREATININE-BSD FRML MDRD: 39 ML/MIN/1.73
GLOBULIN UR ELPH-MCNC: 3 GM/DL
GLUCOSE BLDC GLUCOMTR-MCNC: 220 MG/DL (ref 70–130)
GLUCOSE BLDC GLUCOMTR-MCNC: 246 MG/DL (ref 70–130)
GLUCOSE BLDC GLUCOMTR-MCNC: 291 MG/DL (ref 70–130)
GLUCOSE BLDC GLUCOMTR-MCNC: 307 MG/DL (ref 70–130)
GLUCOSE SERPL-MCNC: 215 MG/DL (ref 65–99)
HCT VFR BLD AUTO: 41.1 % (ref 34–46.6)
HGB BLD-MCNC: 13.7 G/DL (ref 12–15.9)
IMM GRANULOCYTES # BLD AUTO: 0.05 10*3/MM3 (ref 0–0.05)
IMM GRANULOCYTES NFR BLD AUTO: 0.5 % (ref 0–0.5)
LYMPHOCYTES # BLD AUTO: 2.25 10*3/MM3 (ref 0.7–3.1)
LYMPHOCYTES NFR BLD AUTO: 20.5 % (ref 19.6–45.3)
MCH RBC QN AUTO: 30.5 PG (ref 26.6–33)
MCHC RBC AUTO-ENTMCNC: 33.3 G/DL (ref 31.5–35.7)
MCV RBC AUTO: 91.5 FL (ref 79–97)
MONOCYTES # BLD AUTO: 0.85 10*3/MM3 (ref 0.1–0.9)
MONOCYTES NFR BLD AUTO: 7.7 % (ref 5–12)
NEUTROPHILS NFR BLD AUTO: 66.7 % (ref 42.7–76)
NEUTROPHILS NFR BLD AUTO: 7.32 10*3/MM3 (ref 1.7–7)
NRBC BLD AUTO-RTO: 0 /100 WBC (ref 0–0.2)
PLATELET # BLD AUTO: 287 10*3/MM3 (ref 140–450)
PMV BLD AUTO: 10.9 FL (ref 6–12)
POTASSIUM SERPL-SCNC: 4.4 MMOL/L (ref 3.5–5.2)
PROT SERPL-MCNC: 6.2 G/DL (ref 6–8.5)
RBC # BLD AUTO: 4.49 10*6/MM3 (ref 3.77–5.28)
SODIUM SERPL-SCNC: 136 MMOL/L (ref 136–145)
WBC # BLD AUTO: 10.98 10*3/MM3 (ref 3.4–10.8)

## 2021-09-01 PROCEDURE — 82962 GLUCOSE BLOOD TEST: CPT

## 2021-09-01 PROCEDURE — 97530 THERAPEUTIC ACTIVITIES: CPT

## 2021-09-01 PROCEDURE — 63710000001 INSULIN DETEMIR PER 5 UNITS: Performed by: INTERNAL MEDICINE

## 2021-09-01 PROCEDURE — 85025 COMPLETE CBC W/AUTO DIFF WBC: CPT | Performed by: INTERNAL MEDICINE

## 2021-09-01 PROCEDURE — 97032 APPL MODALITY 1+ESTIM EA 15: CPT

## 2021-09-01 PROCEDURE — 97112 NEUROMUSCULAR REEDUCATION: CPT

## 2021-09-01 PROCEDURE — 97535 SELF CARE MNGMENT TRAINING: CPT

## 2021-09-01 PROCEDURE — 97112 NEUROMUSCULAR REEDUCATION: CPT | Performed by: OCCUPATIONAL THERAPIST

## 2021-09-01 PROCEDURE — 63710000001 INSULIN ASPART PER 5 UNITS: Performed by: INTERNAL MEDICINE

## 2021-09-01 PROCEDURE — 97110 THERAPEUTIC EXERCISES: CPT

## 2021-09-01 PROCEDURE — 92507 TX SP LANG VOICE COMM INDIV: CPT

## 2021-09-01 PROCEDURE — 92526 ORAL FUNCTION THERAPY: CPT

## 2021-09-01 PROCEDURE — 80053 COMPREHEN METABOLIC PANEL: CPT | Performed by: INTERNAL MEDICINE

## 2021-09-01 RX ADMIN — AMLODIPINE BESYLATE 10 MG: 10 TABLET ORAL at 21:04

## 2021-09-01 RX ADMIN — INSULIN ASPART 4 UNITS: 100 INJECTION, SOLUTION INTRAVENOUS; SUBCUTANEOUS at 11:47

## 2021-09-01 RX ADMIN — KETOTIFEN FUMARATE 1 DROP: 0.35 SOLUTION/ DROPS OPHTHALMIC at 21:05

## 2021-09-01 RX ADMIN — KETOTIFEN FUMARATE 1 DROP: 0.35 SOLUTION/ DROPS OPHTHALMIC at 17:00

## 2021-09-01 RX ADMIN — Medication 10 MG: at 21:03

## 2021-09-01 RX ADMIN — CHLORTHALIDONE 25 MG: 50 TABLET ORAL at 08:48

## 2021-09-01 RX ADMIN — KETOTIFEN FUMARATE 1 DROP: 0.35 SOLUTION/ DROPS OPHTHALMIC at 08:57

## 2021-09-01 RX ADMIN — INSULIN ASPART 3 UNITS: 100 INJECTION, SOLUTION INTRAVENOUS; SUBCUTANEOUS at 08:55

## 2021-09-01 RX ADMIN — CARVEDILOL 12.5 MG: 6.25 TABLET, FILM COATED ORAL at 08:48

## 2021-09-01 RX ADMIN — AMLODIPINE BESYLATE 10 MG: 10 TABLET ORAL at 08:48

## 2021-09-01 RX ADMIN — Medication 1 TABLET: at 08:48

## 2021-09-01 RX ADMIN — ATORVASTATIN CALCIUM 80 MG: 40 TABLET, FILM COATED ORAL at 21:03

## 2021-09-01 RX ADMIN — LEVOTHYROXINE SODIUM 50 MCG: 50 TABLET ORAL at 05:21

## 2021-09-01 RX ADMIN — LINAGLIPTIN 5 MG: 5 TABLET, FILM COATED ORAL at 08:48

## 2021-09-01 RX ADMIN — OXYCODONE HYDROCHLORIDE AND ACETAMINOPHEN 500 MG: 500 TABLET ORAL at 08:48

## 2021-09-01 RX ADMIN — SODIUM CHLORIDE 500 ML: 9 INJECTION, SOLUTION INTRAVENOUS at 10:16

## 2021-09-01 RX ADMIN — Medication 1000 MCG: at 08:48

## 2021-09-01 RX ADMIN — APIXABAN 5 MG: 5 TABLET, FILM COATED ORAL at 21:04

## 2021-09-01 RX ADMIN — DOCUSATE SODIUM 50 MG AND SENNOSIDES 8.6 MG 1 TABLET: 8.6; 5 TABLET, FILM COATED ORAL at 08:48

## 2021-09-01 RX ADMIN — INSULIN DETEMIR 20 UNITS: 100 INJECTION, SOLUTION SUBCUTANEOUS at 08:55

## 2021-09-01 RX ADMIN — SERTRALINE 25 MG: 25 TABLET, FILM COATED ORAL at 08:48

## 2021-09-01 RX ADMIN — DOCUSATE SODIUM 50 MG AND SENNOSIDES 8.6 MG 1 TABLET: 8.6; 5 TABLET, FILM COATED ORAL at 21:04

## 2021-09-01 RX ADMIN — APIXABAN 5 MG: 5 TABLET, FILM COATED ORAL at 08:48

## 2021-09-01 RX ADMIN — INSULIN ASPART 3 UNITS: 100 INJECTION, SOLUTION INTRAVENOUS; SUBCUTANEOUS at 17:00

## 2021-09-01 RX ADMIN — INSULIN DETEMIR 20 UNITS: 100 INJECTION, SOLUTION SUBCUTANEOUS at 21:04

## 2021-09-01 RX ADMIN — PANTOPRAZOLE SODIUM 40 MG: 40 TABLET, DELAYED RELEASE ORAL at 05:21

## 2021-09-01 NOTE — PROGRESS NOTES
Occupational Therapy: Individual: 90 minutes.    Physical Therapy:    Speech Language Pathology:    Signed by: Lucrecia Andrews, Occupational Therapist

## 2021-09-01 NOTE — THERAPY TREATMENT NOTE
Inpatient Rehabilitation - Occupational Therapy Treatment Note     Tre     Patient Name: Joycelyn Brown  : 1940  MRN: [id number]    Today's Date: 2021                 Admit Date: 2021         ICD-10-CM ICD-9-CM   1. CVA (cerebral vascular accident) (CMS/[facility])  I63.9 434.91       Patient Active Problem List   Diagnosis   • CVA (cerebral vascular accident) (CMS/[facility])       History reviewed. No pertinent past medical history.    No past surgical history on file.             IRF OT ASSESSMENT FLOWSHEET (last 12 hours)      IRF OT Evaluation and Treatment     Row Name 21 1400          OT Time and Intention    Document Type  daily treatment  -     Mode of Treatment  individual therapy;occupational therapy  -     Patient Effort  good  -     Row Name 21 1400          General Information    Patient/Family/Caregiver Comments/Observations  patient agreeable to therapy  -     Existing Precautions/Restrictions  fall;oxygen therapy device and L/min  -     Row Name 21 1400          Motor Skills    Motor Skills  neuro-muscular function;therapeutic exercise  -     Therapeutic Exercise  -- RUE ROM shoulder and elbow. vibration, wt bearing  -       User Key  (r) = Recorded By, (t) = Taken By, (c) = Cosigned By    Initials Name Effective Dates     Maria Guadalupe Monge, OT 21 -            Occupational Therapy Education                 Title: PT OT SLP Therapies (Done)     Topic: Occupational Therapy (Done)     Point: ADL training (Done)     Description:   Instruct learner(s) on proper safety adaptation and remediation techniques during self care or transfers.   Instruct in proper use of assistive devices.              Learning Progress Summary           Patient Acceptance, E,D, VU,NR by CJ at 2021 1541    Acceptance, E, VU,NR by BF at 2021 1507    Acceptance, E,TB, VU by DG at 2021 2040    Acceptance, E,D, VU,NR by AB at 2021 1410                   Point:  Home exercise program (Done)     Description:   Instruct learner(s) on appropriate technique for monitoring, assisting and/or progressing therapeutic exercises/activities.              Learning Progress Summary           Patient Acceptance, E,TB, VU by  at 8/28/2021 2040                   Point: Precautions (Done)     Description:   Instruct learner(s) on prescribed precautions during self-care and functional transfers.              Learning Progress Summary           Patient Acceptance, E,D, VU,NR by  at 8/31/2021 1541    Acceptance, E, VU,NR by  at 8/30/2021 1507    Acceptance, E,TB, VU by  at 8/28/2021 2040    Acceptance, E,D, VU,NR by AB at 8/28/2021 1410                               User Key     Initials Effective Dates Name Provider Type Discipline    AB 06/16/21 -  Yazmin Chaves, OT Occupational Therapist OT     06/16/21 -  Melissa Swift RN Registered Nurse Nurse     06/16/21 -  Debbie Mehta, OT Occupational Therapist OT     06/16/21 -  Lucrecia Andrews, OT Occupational Therapist OT                    OT Recommendation and Plan                         Time Calculation:     Time Calculation- OT     Row Name 09/01/21 1410             Time Calculation-     OT Start Time  1045  -      OT Stop Time  1130  -      OT Time Calculation (min)  45 min  -        User Key  (r) = Recorded By, (t) = Taken By, (c) = Cosigned By    Initials Name Provider Type     Maria Guadalupe Monge, OT Occupational Therapist        Therapy Charges for Today     Code Description Service Date Service Provider Modifiers Qty    44847868511 HC OT NEUROMUSC RE EDUCATION EA 15 MIN 8/31/2021 Maria Guadalupe Monge, OT GO, KX 3    80686149043 HC OT NEUROMUSC RE EDUCATION EA 15 MIN 9/1/2021 Maria Guadalupe Monge, OT GO, KX 3                   Maria Guadalupe Monge OT  9/1/2021

## 2021-09-01 NOTE — THERAPY TREATMENT NOTE
Inpatient Rehabilitation - Physical Therapy Treatment Note        Tre     Patient Name: Joycelyn Brown  : 1940  MRN: [id number]    Today's Date: 2021                    Admit Date: 2021      Visit Dx:     ICD-10-CM ICD-9-CM   1. CVA (cerebral vascular accident) (CMS/[facility])  I63.9 434.91       Patient Active Problem List   Diagnosis   • CVA (cerebral vascular accident) (CMS/HCC)       History reviewed. No pertinent past medical history.    No past surgical history on file.       PT ASSESSMENT (last 12 hours)      IRF PT Evaluation and Treatment     Row Name 21 1540          PT Time and Intention    Document Type  daily treatment  -RF     Mode of Treatment  physical therapy  -RF     Patient/Family/Caregiver Comments/Observations  Pt c/o weakness and fatigue with activity. Pt seems discouraged this date.  -RF     Row Name 21 1540          General Information    Patient Profile Reviewed  yes  -RF     Row Name 21 1540          Cognition/Psychosocial    Affect/Mental Status (Cognitive)  WFL  -RF     Orientation Status (Cognition)  oriented to;place;time  -RF     Follows Commands (Cognition)  WFL;follows one-step commands;over 90% accuracy  -RF     Row Name 21 1540          Bed Mobility    Bed Mobility  supine-sit;rolling left;rolling right;sit-supine  -RF     Supine-Sit [place] (Bed Mobility)  maximum assist (25% patient effort)  -RF     Sit-Supine [place] (Bed Mobility)  maximum assist (25% patient effort)  -RF     Row Name 21 1540          Transfer Assessment/Treatment    Transfers  sit-stand transfer;stand-sit transfer;stand pivot/stand step transfer  -RF     Row Name 21 1540          Transfers    Chair-Bed [place] (Transfers)  maximum assist (25% patient effort);2 person assist  -RF     Sit-Stand [place] (Transfers)  maximum assist (25% patient effort);2 person assist  -RF     Stand-Sit [place] (Transfers)  maximum assist (25% patient  effort);2 person assist  -     Row Name 09/01/21 1540          Chair-Bed Transfer    Assistive Device (Chair-Bed Transfers)  wheelchair  -     Row Name 09/01/21 1540          Sit-Stand Transfer    Assistive Device (Sit-Stand Transfers)  wheelchair;other (see comments) parallel bars  -     Row Name 09/01/21 1540          Stand-Sit Transfer    Assistive Device (Stand-Sit Transfers)  wheelchair;other (see comments) parallel bars  -     Row Name 09/01/21 1540          Balance    Static Standing Balance  moderate impairment;severe impairment;supported;unsupported;standing;other (see comments)  -     Comment, Balance  Pt performed static standing in parallel bars; mirror used for postural feedback. Pt able to achieve rupright posture but requires increased assistance and cuing to maintain.   -[facility] Name 09/01/21 1540          Hip (Therapeutic Exercise)    Hip AAROM (Therapeutic Exercise)  right;flexion;extension;aDduction;aBduction;sitting;2 sets;10 repetitions  -     Hip Strengthening (Therapeutic Exercise)  left;flexion;extension;aBduction;aDduction;marching while seated;sitting;2 sets;10 repetitions attempted standing march; trace cont noted  -[facility] Name 09/01/21 1540          Knee (Therapeutic Exercise)    Knee Strengthening (Therapeutic Exercise)  bilateral;flexion;extension;marching while seated;LAQ (long arc quad);hamstring curls;sitting;2 sets;10 repetitions;other (see comments) pt requires AAROM with march; able to perf LAQ AROM  -[facility] Name 09/01/21 1540          Ankle (Therapeutic Exercise)    Ankle Strengthening (Therapeutic Exercise)  bilateral;dorsiflexion;plantarflexion;sitting;2 sets;10 repetitions;other (see comments) R DF/PF weak, contraction visually noted.   -     Row Name 09/01/21 1540          Modality Intervention    Additional Documentation  Modality Treatment (Group)  -[facility] Name 09/01/21 1540          Modality Intervention (Comprehensive)    Modality Treatment   [place] stimulation  -RF     Additional Documentation  Modality Treatment (Row)  -RF     Row Name 09/01/21 1540          [place] Electrical Stimulation Treatment    Location 1 (Russian E-Stim Treatment)  lower extremity treatment area;other (see comments) R hip flexors  -RF     Indications (Russian E-Stim Treatment)  enhance muscle contraction  -RF     Treatment Details (Russian Electrical Stimulation Treatment)  15 mins, 10/20, 33 mA  -RF     Response to Treatment (Russian E-Stim Treatment)  other (see comments) No visible contraction noted; no skin changes noted.   -RF     Comment ([place]ian E-Stim Treatment)  Pt cued for seated march with on cycle  -RF     Row Name 09/01/21 1540          IRF PT Goals    Bed Mobility Goal Selection (PT-IRF)  bed mobility, PT goal 1  -RF     Transfer Goal Selection (PT-IRF)  transfers, PT goal 1  -RF     Gait (Walking Locomotion) Goal Selection (PT-IRF)  gait, PT goal 1  -RF     Stairs Goal Selection (PT-IRF)  stairs, PT goal 1  -RF     Row Name 09/01/21 1540          Bed Mobility Goal 1 (PT-IRF)    Activity/Assistive Device (Bed Mobility Goal 1, PT-IRF)  bed mobility activities, all  -RF     [place] Level (Bed Mobility Goal 1, PT-IRF)  standby assist  -RF     Time Frame (Bed Mobility Goal 1, PT-IRF)  long-term goal (LTG)  -RF     Row Name 09/01/21 1540          Transfer Goal 1 (PT-IRF)    Activity/Assistive Device (Transfer Goal 1, PT-IRF)  all transfers  -RF     [place] Level (Transfer Goal 1, PT-IRF)  minimum assist (75% or more patient effort)  -RF     Time Frame (Transfer Goal 1, PT-IRF)  by discharge  -RF     Row Name 09/01/21 1540          Gait/Walking Locomotion Goal 1 (PT-IRF)    Activity/Assistive Device (Gait/Walking Locomotion Goal 1, PT-IRF)  gait (walking locomotion);assistive device use  -RF     Gait/Walking Locomotion Distance Goal 1 (PT-IRF)  5  -RF     [place] Level (Gait/Walking Locomotion Goal 1, PT-IRF)  minimum assist (75% or more patient effort)   -RF     Time Frame (Gait/Walking Locomotion Goal 1, PT-IRF)  long-term goal (LTG)  -RF     Row Name 09/01/21 1540          Stairs Goal 1 (PT-IRF)    Activity/Assistive Device (Stairs Goal 1, PT-IRF)  stairs, all skills;ascending stairs;descending stairs;using handrail, left  -RF     Number of Stairs (Stairs Goal 1, PT-IRF)  5  -RF     [place] Level (Stairs Goal 1, PT-IRF)  minimum assist (75% or more patient effort);moderate assist (50-74% patient effort)  -RF     Time Frame (Stairs Goal 1, PT-IRF)  long-term goal (LTG)  -RF     Row Name 09/01/21 1540          Positioning and Restraints    Pre-Treatment Position  in bed  -RF     In Wheelchair  sitting;with OT  -RF     Row Name 09/01/21 1540          Therapy Assessment/Plan (PT)    Rehab Potential/Prognosis (PT)  adequate, monitor progress closely  -RF     Frequency of Treatment (PT)  5 times per week  -RF     Estimated Duration of Therapy (PT)  2 weeks  -RF     Problem List (PT)  problems related to;balance;hemiparesis/hemiplegia;mobility;strength  -RF     Row Name 09/01/21 1540          Daily Progress Summary (PT)    Functional Goal Overall Progress (PT)  progressing toward functional goals as expected  -RF     Daily Progress Summary (PT)  Improvements noted in static standing this date. Poor neuromuscular activation noted in RLE. Continued skilled care required for further improvements to ensure maximum safe function upon D/C.   -RF     Impairments Still Limiting Function (PT)  ROM decreased;sensation decreased;strength decreased;impaired balance;impaired communication;impaired functional activity tolerance;motor control impaired;postural control impaired;sensory feedback impaired;pain  -RF     Recommendations (PT)  Contiue per current POC.   -RF       User Key  (r) = Recorded By, (t) = Taken By, (c) = Cosigned By    Initials Name Provider Type    RF Malinda Alicea PTA Physical Therapy Assistant           Physical Therapy Education                 Title:  PT OT SLP Therapies (Done)     Topic: Physical Therapy (Done)     Point: Mobility training (Done)     Learning Progress Summary           Patient Acceptance, E,TB, VU by RF at 9/1/2021 1548    Acceptance, E,TB, VU by RF at 8/31/2021 1423    Acceptance, E,TB, VU by RF at 8/30/2021 1548                   Point: Home exercise program (Done)     Learning Progress Summary           Patient Acceptance, E,TB, VU by RF at 9/1/2021 1548    Acceptance, E,TB, VU by RF at 8/31/2021 1423    Acceptance, E,TB, VU by RF at 8/30/2021 1548                   Point: Body mechanics (Done)     Learning Progress Summary           Patient Acceptance, E,TB, VU by RF at 9/1/2021 1548    Acceptance, E,TB, VU by RF at 8/31/2021 1423    Acceptance, E,TB, VU by RF at 8/30/2021 1548                   Point: Precautions (Done)     Learning Progress Summary           Patient Acceptance, E,TB, VU by RF at 9/1/2021 1548    Acceptance, E,TB, VU by RF at 8/31/2021 1423    Acceptance, E,TB, VU by RF at 8/30/2021 1548                               User Key     Initials Effective Dates Name Provider Type Discipline     06/16/21 -  Malinda Alicea PTA Physical Therapy Assistant PT                PT Recommendation and Plan    Frequency of Treatment (PT): 5 times per week     Daily Progress Summary (PT)  Functional Goal Overall Progress (PT): progressing toward functional goals as expected  Daily Progress Summary (PT): Improvements noted in static standing this date. Poor neuromuscular activation noted in RLE. Continued skilled care required for further improvements to ensure maximum safe function upon D/C.   Impairments Still Limiting Function (PT): ROM decreased, sensation decreased, strength decreased, impaired balance, impaired communication, impaired functional activity tolerance, motor control impaired, postural control impaired, sensory feedback impaired, pain  Recommendations (PT): Contiue per current POC.                Time Calculation:     PT  Charges     Row Name 09/01/21 1548             Time Calculation    Start Time  0915  -RF      Stop Time  1045  -RF      Time Calculation (min)  90 min  -RF      PT Received On  09/01/21  -RF      PT - Next Appointment  09/02/21  -RF      PT Goal Re-Cert Due Date  09/03/21  -RF         Time Calculation- PT    Total Timed Code Minutes- PT  90 minute(s)  -RF        User Key  (r) = Recorded By, (t) = Taken By, (c) = Cosigned By    Initials Name Provider Type    RF Malinda Alicea, PTA Physical Therapy Assistant          Therapy Charges for Today     Code Description Service Date Service Provider Modifiers Qty    52490891514 HC PT NEUROMUSC RE EDUCATION EA 15 MIN 8/31/2021 Malinda Alicea, PTA GP, KX 2    15936432695 HC PT THER PROC EA 15 MIN 8/31/2021 Malinda Alicea, PTA GP, KX 2    94684028851 HC PT THERAPEUTIC ACT EA 15 MIN 8/31/2021 Malinad Alicea, PTA GP, KX 2    66200315631 HC PT NEUROMUSC RE EDUCATION EA 15 MIN 9/1/2021 Malinda Alicea, PTA GP, KX 2    54942796460 HC PT THER PROC EA 15 MIN 9/1/2021 Malinda Alicea, PTA GP, KX 2    09911301477 HC PT THERAPEUTIC ACT EA 15 MIN 9/1/2021 Malinda Alicea, PTA GP, KX 2                   Malindaswapnil Alicea, PTA  9/1/2021

## 2021-09-01 NOTE — THERAPY TREATMENT NOTE
Inpatient Rehabilitation - Speech Language Pathology   Swallow Treatment Note [facility] [place]     Patient Name: Joycelyn Brown  : 1940  MRN: [id number]  Today's Date: 2021               Admit Date: 2021    Visit Dx:     ICD-10-CM ICD-9-CM   1. CVA (cerebral vascular accident) (CMS/[facility])  I63.9 434.91     Patient Active Problem List   Diagnosis   • CVA (cerebral vascular accident) (CMS/[facility])     History reviewed. No pertinent past medical history.  No past surgical history on file.    Dysphagia Tx Note:      Ms. Brown is seen in pt room 106B of inpatient physical rehabilitation this am for formal dysphagia tx.  She is cooperative to participate on this date.   She is noted w/ moderate fatigue and requires rest periods across therapy tasks.      Long Term Goal:  Pt will accept least restrictive diet tolerance w/o overt s/s aspiration.      Short Term Goals:  1. Pt will tolerate facial massage to R  Facial surface for 3-7 minutes to increase surface blood flow, sensation and ROM over 3 consecutive sessions.   *R mechanical facial massage for 7 Minutes w/ mildly increased surface blood flow.    2. Pt will perform OROM/MONAE exercises x3 sets x10 reps w/ min cues.  *x4 sets x10 reps w/ mod cues.     3. Pt will perform resistive breathing exercises x3 sets x5 reps w/resistance of 2-5  To improve respiratory support and control.   *x3 sets x10 reps w/ inhale/exhale level 2/2 w/ mod cues, fatigue effects noted.     4. Pt will perform laryngeal adduction/elevation exercises x3 sets x10 reps w/ min cues.   *x1 sets x5 reps pre-resistive breather w/ mod cues, mean phonation duration approximately 5.68 seconds.   *x3 sets x5 reps post-resistive breather w/ mod cues, mean phonation duration approximately 5.86 seconds.  Fatigue effects noted.     5. Pt will perform [place]aker exercises sustained x3 sets x5 reps for 30+ seconds over 3 consecutive sessions.   *Not directly addressed today.      6. Pt will perform [place]  "exercises repetitive x3 sets x12 reps w/ mod cues.   *Not directly addressed      7. Pt will perform compensatory techniques during meals w/ min cues.  *Min cues required for \"small sips\" w/ thin liquids via cup across session.  She is noted w/o incidents of oral loss from R side.        8. Pt will participate in instrumental re-evaluation of swallowing fnx in 7-10 days, pending progress towards this poc.  *S/p MBS 8/28/21 w/ recommendation for mechanical soft diet, nectar thick liquids, water protocol between meals. Continue modified po diet and tx per poc.     Thank you-  Maryam Jones M.S., [facility]-SLP      -------------------------------------------------------------------------------------------------------      DYSARTHRIA THERAPY PLAN OF CARE:     Ms. Brown is seen in the speech therapy office of inpatient physical rehabilitation this am for formal dysphagia tx.  She is cooperative to participate on this date.   She is noted w/ moderate fatigue and requires rest periods across therapy tasks.      Long Term Goal:  Pt will improve motor speech to allow for maximal communication and safety in adls.      Short Term Goals:  1. Pt will tolerate facial massage to R dacial surface for 3-7 minutes to increase surface blood flow, sensation and ROM over 3 consecutive sessions.   *R mechanical facial massage for 7 Minutes w/ mildly increased surface blood flow.    2. Pt will perform OROM/MONAE exercises x3 sets x10 reps w/ min cues.  *x4 sets x10 reps w/ mod cues.     3. Pt will perform resistive breathing exercises x3 sets x5 reps w/resistance of 2-5 to improve respiratory support and control.   *x3 sets x10 reps w/ inhale/exhale level 2/2 w/ mod cues, fatigue effects noted.     4. Pt will accurately produce multi-syllabic words 4/5 opp w/ min cues over 3 consecutive sessions.   *Multi-syllabic words of pt name and children's names w/ mod cues for decreased rate. 3/5 opp    5. Pt will overarticulate and hyperphonate in " connected speech 4/5 opp w/ min cues over 3 consecutive sessions.   *Mod cues required for overarticulation and hyperphonation of sentences in 3/5 opp     6. Pt will improve intelligibility to 100% for both known/unknown context in conversation w/ min cues over 3 consecutive sessions.   *approx 95% intelligible w/ unknown context and mod cues for decreased rate and overarticulation.      *Goals to be added/modified as necessary.      Thank you-  Maryam Jones M.S., CCC-SLP        SLP Recommendation and Plan  Continue modified po diet and tx per poc.     EDUCATION  The patient has been educated in the following areas:   Dysphagia (Swallowing Impairment) Modified Diet Instruction.     Time Calculation:   Time Calculation- SLP     Row Name 09/01/21 0938             Time Calculation- SLP    SLP Start Time  0830  -      SLP Stop Time  0915  -      SLP Time Calculation (min)  45 min  -      SLP - Next Appointment  09/02/21  -        User Key  (r) = Recorded By, (t) = Taken By, (c) = Cosigned By    Initials Name Provider Type     Maryam Jones MS, CFY-SLP Speech and Language Pathologist        Therapy Charges for Today     Code Description Service Date Service Provider Modifiers Qty    68553619585 HC ST TREATMENT SPEECH 1 8/31/2021 Maryam Jones MS, CFY-SLP GN, KX 1    38263815170 HC ST TREATMENT SWALLOW 2 8/31/2021 Maryam Jones MS, CFY-SLP GN, KX 1    99771898636 HC ST TREATMENT SPEECH 1 9/1/2021 Maryam Jones MS, CFY-SLP GN, KX 1    36528061539 HC ST TREATMENT SWALLOW 2 9/1/2021 Maryam Jones MS, CFY-SLP GN, KX 1        Patient was not wearing a face mask during this therapy encounter. Therapist used appropriate personal protective equipment including mask, eye protection and gloves.  Mask used was standard procedure mask. Appropriate PPE was worn during the entire therapy session. The patient did not cough during this evaluation. Therapist was within 6 feet for 15 minutes or more during the evaluation. Hand hygiene  was completed before and after therapy session. Patient is not in enhanced droplet precautions.     Maryam Jones MS, CFY-SLP  9/1/2021

## 2021-09-01 NOTE — PROGRESS NOTES
Occupational Therapy:    Physical Therapy: Individual: 90 minutes.    Speech Language Pathology:    Signed by: Malinda Alicea PTA

## 2021-09-01 NOTE — PROGRESS NOTES
PROGRESS NOTE         Patient Identification:  Name:  Joycelyn Brown  Age:  80 y.o.  Sex:  female  :  1940  MRN:  [id number]  Visit Number:  [id number]  Primary Care Provider:  Donya Looney APRN         LOS: 5 days       ----------------------------------------------------------------------------------------------------------------------  Subjective       Chief Complaints:    MCA CVA  Dysarthria  Dysphagia      Interval History:      Patient seems to be stable without any evidence of acute distress with no issues reported overnight and no fever blood pressure slightly on the borderline with a systolic but the diastolic is appropriate.  Remains on nasal cannula 3 L/min and today's labs reveal a stable hemoglobin and WBC of 10.98 which is fairly stable but a creatinine at 1.31 and a BUN of 29 which both are higher than previously reported on 2021 with creatinine at 1.04 and BUN at 22.  For now would attempt with gentle hydration and recheck labs in a.m. as patient could be slightly dehydrated.    ----------------------------------------------------------------------------------------------------------------------      Objective       Current [facility] Meds:  amLODIPine, 10 mg, Oral, BID  apixaban, 5 mg, Oral, Q12H  ascorbic acid, 500 mg, Oral, Daily  aspirin, 81 mg, Oral, Daily  atorvastatin, 80 mg, Oral, Nightly  carvedilol, 12.5 mg, Oral, BID With Meals  chlorthalidone, 25 mg, Oral, Daily  clopidogrel, 75 mg, Oral, Daily  insulin aspart, 0-7 Units, Subcutaneous, TID AC  insulin detemir, 20 Units, Subcutaneous, Q12H  ketotifen, 1 drop, Both Eyes, TID  levothyroxine, 50 mcg, Oral, Q AM  linagliptin, 5 mg, Oral, Daily  melatonin, 10 mg, Oral, Nightly  multivitamin with minerals, 1 tablet, Oral, Daily  pantoprazole, 40 mg, Oral, Q AM  senna-docusate sodium, 1 tablet, Oral, BID  sertraline, 25 mg, Oral, Daily  vitamin B-12, 1,000 mcg, Oral, Daily          ----------------------------------------------------------------------------------------------------------------------    Vital Signs:  Temp:  [97.9 °F (36.6 °C)-98 °F (36.7 °C)] 97.9 °F (36.6 °C)  Heart Rate:  [57-63] 63  Resp:  [20] 20  BP: (141-166)/(46-69) 149/46  No data found.  SpO2 Percentage    08/30/21 1908 08/31/21 0928 08/31/21 1900   SpO2: 96% 97% 99%     SpO2:  [97 %-99 %] 99 %  on  Flow (L/min):  [3] 3;   Device (Oxygen Therapy): nasal cannula    Body mass index is 28.19 kg/m².  Wt Readings from Last 3 Encounters:   08/27/21 81.6 kg (180 lb)   02/10/15 81.7 kg (180 lb 0.1 oz)        Intake/Output Summary (Last 24 hours) at 9/1/2021 3974  Last data filed at 9/1/2021 3851  Gross per 24 hour   Intake 840 ml   Output --   Net 840 ml     Diet Soft Texture; Chopped; Nectar / Syrup Thick  ----------------------------------------------------------------------------------------------------------------------      Physical Exam:    General Appearance: alert, pleasant, interactive and cooperative.    Very comfortable with no acute distress.     Head: Right-sided facial droop     Throat: no oral lesions, no thrush, oral mucosa moist and oopharynx normal     Neck: no adenopathy, supple, trachea midline, no thyromegaly, no carotid bruit  and no JVD     Lungs: clear to auscultation, respirations regular, respirations even and  respirations unlabored. No accessory muscle use.      Heart:: regular rhythm & normal rate, normal S1, S2, no murmur, no gallop, no  rub and no click.  Chest wall with no abnormalities observed. PMI nondisplaced     Abdomen: normal bowel sounds in all quadrants, soft non-tender, no guarding and no rebound tenderness     Extremities: no edema, no cyanosis, no redness, no tenderness, no clubbing     Musculoskeletal: joints with no effusion nor erythema nor warmth.  Pedal pulses palpable and equal bilaterally     Skin: no bleeding, bruising or rash and no lesions noted     Neurologic:                Mental Status: Patient is awake alert and oriented x2, dysarthria and dysphagia              Sensory: Sensory decreased to the right side and right face              Motor strength: Right-sided facial droop and right sided hemiparesis      ----------------------------------------------------------------------------------------------------------------------            Results from last 7 days   Lab Units 09/01/21 0224 08/28/21  0129   WBC 10*3/mm3 10.98* 10.19   HEMOGLOBIN g/dL 13.7 13.2   HEMATOCRIT % 41.1 39.2   MCV fL 91.5 89.1   MCHC g/dL 33.3 33.7   PLATELETS 10*3/mm3 287 260     Results from last 7 days   Lab Units 09/01/21 0223 08/28/21  0129   SODIUM mmol/L 136 135*   POTASSIUM mmol/L 4.4 4.3   MAGNESIUM mg/dL  --  2.4   CHLORIDE mmol/L 97* 100   CO2 mmol/L 30.3* 25.8   BUN mg/dL 29* 22   CREATININE mg/dL 1.31* 1.04*   EGFR IF NONAFRICN AM mL/min/1.73 39* 51*   CALCIUM mg/dL 9.5 8.9   GLUCOSE mg/dL 215* 299*   ALBUMIN g/dL 3.21* 2.88*   BILIRUBIN mg/dL 0.3 0.2   ALK PHOS U/L 93 89   AST (SGOT) U/L 16 26   ALT (SGPT) U/L 18 28   Estimated Creatinine Clearance: 38.3 mL/min (A) (by C-G formula based on SCr of 1.31 mg/dL (H)).  No results found for: AMMONIA    Glucose   Date/Time Value Ref Range Status   09/01/2021 0621 220 (H) 70 - 130 mg/dL Final     Comment:     Meter: [id number] : [id number] Enrique Crystal   08/31/2021 1930 243 (H) 70 - 130 mg/dL Final     Comment:     Meter: [id number] : [id number] Jennings Crystal   08/31/2021 1612 158 (H) 70 - 130 mg/dL Final     Comment:     Meter: [id number] : [id number] GARZA LUCI   08/31/2021 1116 340 (H) 70 - 130 mg/dL Final     Comment:     Meter: [id number] : [id number] Jason Donya   08/31/2021 0646 164 (H) 70 - 130 mg/dL Final     Comment:     Meter: [id number] : [id number] Jaspreet Lerma   08/30/2021 1951 314 (H) 70 - 130 mg/dL Final     Comment:     Meter: [id number] : [id number] Jaspreet Lerma   08/30/2021 1613 171 (H) 70 - 130  mg/dL Final     Comment:     Meter: [id number] : [id number] samantha davis   08/30/2021 1111 224 (H) 70 - 130 mg/dL Final     Comment:     Meter: [id number] : [id number] Casie Naqvi     No results found for: HGBA1C  No results found for: TSH, FREET4    No results found for: BLOODCX  No results found for: URINECX  No results found for: WOUNDCX  No results found for: STOOLCX  No results found for: RESPCX  Pain Management Panel    There is no flowsheet data to display.           ----------------------------------------------------------------------------------------------------------------------  Imaging Results (Last 24 Hours)     ** No results found for the last 24 hours. **          ----------------------------------------------------------------------------------------------------------------------    Assessment/Plan       Assessment/Plan     ASSESSMENT:    Acute left MCA pontine ischemic stroke  High-grade stenosis of left M1 segment  Moderate to severe left vertebral artery stenosis  Right hemiparesis  Dysarthria  Dysphagia  Hypertension  Hyperlipidemia  Acute respiratory failure requiring intubation  Aspiration pneumonia  Poorly controlled diabetes mellitus type 2  Hypothyroidism  GERD  UTI  Constipation  Depression      PLAN:    Patient seems to be stable without any evidence of acute distress with no issues reported overnight and no fever blood pressure slightly on the borderline with a systolic but the diastolic is appropriate.  Remains on nasal cannula 3 L/min and today's labs reveal a stable hemoglobin and WBC of 10.98 which is fairly stable but a creatinine at 1.31 and a BUN of 29 which both are higher than previously reported on 8/28/2021 with creatinine at 1.04 and BUN at 22.  For now would attempt with gentle hydration and recheck labs in a.m. as patient could be slightly dehydrated.    Patient participated with physical therapy and Occupational Therapy on 8/31/2021 as well as speech  therapy and was at maximum assist with chair to bed and bed to chair transfers as well as maximum assist with sit to stand and stand to sit transfers with the use of wheelchair and was unable to walk or stand up and participated with hip knee and ankle exercises with 2 sets and 10 repetitions.    Finished her course of aspirin and Plavix on August 31 and switched over to Eliquis 5 mg p.o. twice daily to continue that until her follow-up with neurology.    Estimated length of stay 21 days.     Prior living situation patient lived at home with family and was independent with all activities prior to her stroke.     Patient continues to require intensive inpatient physical therapy for therapeutic exercises, range of motion, endurance, gait training, safety, stairs training, strengthening for at least 1 to 2 hours a day for 5 to 6 days a week as well as occupational therapy for dressing, ADLs, feeding, home skills, safety and toileting techniques for 1 to 2 hours a day for 5 to 6 days a week, as well as speech and language pathology therapy for communication, expression, dysphasia, aspiration needs for 30 to 90 minutes a day for 5 to 6 days a week.        Code Status:   Code Status and Medical Interventions:   Ordered at: 08/27/21 1221     Code Status:    CPR     Medical Interventions (Level of Support Prior to Arrest):    Full       Joselito Hernandez MD  09/01/21  07:27 EDT

## 2021-09-01 NOTE — PROGRESS NOTES
Rehabilitation Nursing  Inpatient Rehabilitation Plan of Care Note    Plan of Care  Copy from Justina    Pain Management (Active)  Current Status (8/27/2021 10:38:00 AM): potential for pain  Weekly Goal: No pain this week  Discharge Goal: No pain    Safety    Potential for Injury (Active)  Current Status (8/27/2021 10:38:00 AM): At risk for injury  Weekly Goal: No injury this week  Discharge Goal: No injuries    Signed by: Jemima Lynn Nurse

## 2021-09-01 NOTE — THERAPY TREATMENT NOTE
Inpatient Rehabilitation - Occupational Therapy Treatment Note     Tre     Patient Name: Joycelyn Brown  : 1940  MRN: [id number]    Today's Date: 2021                 Admit Date: 2021         ICD-10-CM ICD-9-CM   1. CVA (cerebral vascular accident) (CMS/HCC)  I63.9 434.91       Patient Active Problem List   Diagnosis   • CVA (cerebral vascular accident) (CMS/[facility])       History reviewed. No pertinent past medical history.    No past surgical history on file.             IRF OT ASSESSMENT FLOWSHEET (last 12 hours)      IRF OT Evaluation and Treatment     Row Name 21 1507 21 1400       OT Time and Intention    Document Type  daily treatment  -  daily treatment  -    Mode of Treatment  occupational therapy  -  individual therapy;occupational therapy  -    Patient Effort  --  good  -    Symptoms Noted During/After Treatment  fatigue  -  --    Row Name 21 1507 21 1400       General Information    Patient/Family/Caregiver Comments/Observations  agreeable to therapy  -  patient agreeable to therapy  [facility]    Existing Precautions/Restrictions  fall;oxygen therapy device and L/min;swallow precautions  [facility]  fall;oxygen therapy device and L/min  -    Row Name 21 1507          Cognition/Psychosocial    Follows Commands (Cognition)  follows one-step commands;follows two-step commands;verbal cues/prompting required  -     Row Name 21 1507 21 1400       Motor Skills    Motor Skills  --  neuro-muscular function;therapeutic exercise  [facility]    Motor Control/Coordination Interventions  therapeutic exercise/ROM;neuro-muscular re-education RUE AA/PROM, NDT  -  --    Therapeutic Exercise  --  -- RUE ROM shoulder and elbow. vibration, wt bearing  -    Row Name 21 1507          Neuromuscular Re-education    Interventions (Neuromuscular Re-education)  facilitation/inhibition;tapping;massage RUE facilitation  -     Row Name 21 1507          Grooming     [place] Level (Grooming)  maximum assist (25% patient effort);verbal cues  -     Row Name 09/01/21 1507          Modality Intervention    Comment, Modality Treatment  NMES applied X 15 mins for R shld flex w/good tolerance noted.  -     Row Name 09/01/21 1507          Positioning and Restraints    Post Treatment Position  bed  -     In Bed  call light within reach;encouraged to call for assist;notified nsg;heels elevated;RUE elevated seen at bedside   -     Row Name 09/01/21 1507          Vital Signs    Intra SpO2 (%)  96  -     O2 Delivery Intra Treatment  supplemental O2  -       User Key  (r) = Recorded By, (t) = Taken By, (c) = Cosigned By    Initials Name Effective Dates     Lucrecia Andrews, OT 06/16/21 -     Maria Guadalupe Tyler, OT 06/16/21 -            Occupational Therapy Education                 Title: PT OT SLP Therapies (Done)     Topic: Occupational Therapy (Done)     Point: ADL training (Done)     Description:   Instruct learner(s) on proper safety adaptation and remediation techniques during self care or transfers.   Instruct in proper use of assistive devices.              Learning Progress Summary           Patient Acceptance, E,D, VU,NR by  at 9/1/2021 1520    Acceptance, E,D, VU,NR by  at 8/31/2021 1541    Acceptance, E, VU,NR by  at 8/30/2021 1507    Acceptance, E,TB, VU by  at 8/28/2021 2040    Acceptance, E,D, VU,NR by AB at 8/28/2021 1410                   Point: Home exercise program (Done)     Description:   Instruct learner(s) on appropriate technique for monitoring, assisting and/or progressing therapeutic exercises/activities.              Learning Progress Summary           Patient Acceptance, E,TB, VU by DG at 8/28/2021 2040                   Point: Precautions (Done)     Description:   Instruct learner(s) on prescribed precautions during self-care and functional transfers.              Learning Progress Summary           Patient Acceptance, E,D,  VU,NR by  at 9/1/2021 1520    Acceptance, E,D, VU,NR by  at 8/31/2021 1541    Acceptance, E, VU,NR by  at 8/30/2021 1507    Acceptance, E,TB, VU by  at 8/28/2021 2040    Acceptance, E,D, VU,NR by AB at 8/28/2021 1410                               User Key     Initials Effective Dates Name Provider Type Discipline    AB 06/16/21 -  Yazmin Chaves, OT Occupational Therapist OT     06/16/21 -  Melissa Swift, RN Registered Nurse Nurse     06/16/21 -  Debbie Mehta, OT Occupational Therapist OT     06/16/21 -  Lucrecia Andrews, OT Occupational Therapist OT                    OT Recommendation and Plan                         Time Calculation:     Time Calculation- OT     Row Name 09/01/21 1520 09/01/21 1516 09/01/21 1410       Time Calculation- OT    OT Start Time  1330  -  --  -  1045  -    OT Stop Time  1415  -  --  -  1130  -    OT Time Calculation (min)  45 min  -  --  -  45 min  -    Total Timed Code Minutes- OT  45 minute(s)  -  --  -  --      User Key  (r) = Recorded By, (t) = Taken By, (c) = Cosigned By    Initials Name Provider Type     Lucrecia Andrews, OT Occupational Therapist     Maria Guadalupe Monge, OT Occupational Therapist        Therapy Charges for Today     Code Description Service Date Service Provider Modifiers Qty    23885251400 HC OT SELF CARE/MGMT/TRAIN EA 15 MIN 8/31/2021 Lucrecia Andrews OT GO, KX 1    95842318851 HC OT NEUROMUSC RE EDUCATION EA 15 MIN 8/31/2021 Lucrecia Andrews OT GO, KX 1    25463664642 HC OT ELEC STIM EA-PER 15 MIN 8/31/2021 Lucrecia Andrews OT GO, KX 1    72357079728 HC OT NEUROMUSC RE EDUCATION EA 15 MIN 9/1/2021 Lucrecia Andrews OT GO, KX 1    08520874301 HC OT ELEC STIM EA-PER 15 MIN 9/1/2021 Lucrecia Andrews, OT GO, KX 1    06062562799 HC OT SELF CARE/MGMT/TRAIN EA 15 MIN 9/1/2021 Lucrecia Andrews, OT GO, KX 1                   Lucrecia Andrews OT  9/1/2021

## 2021-09-01 NOTE — PROGRESS NOTES
Occupational Therapy:    Physical Therapy:    Speech Language Pathology: Individual: 45 minutes.    Signed by: MEET Chen

## 2021-09-02 LAB
ANION GAP SERPL CALCULATED.3IONS-SCNC: 9.6 MMOL/L (ref 5–15)
BUN SERPL-MCNC: 28 MG/DL (ref 8–23)
BUN/CREAT SERPL: 28.3 (ref 7–25)
CALCIUM SPEC-SCNC: 9.8 MG/DL (ref 8.6–10.5)
CHLORIDE SERPL-SCNC: 97 MMOL/L (ref 98–107)
CO2 SERPL-SCNC: 28.4 MMOL/L (ref 22–29)
CREAT SERPL-MCNC: 0.99 MG/DL (ref 0.57–1)
GFR SERPL CREATININE-BSD FRML MDRD: 54 ML/MIN/1.73
GLUCOSE BLDC GLUCOMTR-MCNC: 176 MG/DL (ref 70–130)
GLUCOSE BLDC GLUCOMTR-MCNC: 177 MG/DL (ref 70–130)
GLUCOSE BLDC GLUCOMTR-MCNC: 327 MG/DL (ref 70–130)
GLUCOSE BLDC GLUCOMTR-MCNC: 357 MG/DL (ref 70–130)
GLUCOSE SERPL-MCNC: 222 MG/DL (ref 65–99)
POTASSIUM SERPL-SCNC: 4 MMOL/L (ref 3.5–5.2)
SODIUM SERPL-SCNC: 135 MMOL/L (ref 136–145)

## 2021-09-02 PROCEDURE — 82962 GLUCOSE BLOOD TEST: CPT

## 2021-09-02 PROCEDURE — 97110 THERAPEUTIC EXERCISES: CPT

## 2021-09-02 PROCEDURE — 80048 BASIC METABOLIC PNL TOTAL CA: CPT | Performed by: INTERNAL MEDICINE

## 2021-09-02 PROCEDURE — 97112 NEUROMUSCULAR REEDUCATION: CPT

## 2021-09-02 PROCEDURE — 92507 TX SP LANG VOICE COMM INDIV: CPT

## 2021-09-02 PROCEDURE — 97535 SELF CARE MNGMENT TRAINING: CPT | Performed by: OCCUPATIONAL THERAPIST

## 2021-09-02 PROCEDURE — 97112 NEUROMUSCULAR REEDUCATION: CPT | Performed by: OCCUPATIONAL THERAPIST

## 2021-09-02 PROCEDURE — 63710000001 INSULIN DETEMIR PER 5 UNITS: Performed by: INTERNAL MEDICINE

## 2021-09-02 PROCEDURE — 97116 GAIT TRAINING THERAPY: CPT

## 2021-09-02 PROCEDURE — 92526 ORAL FUNCTION THERAPY: CPT

## 2021-09-02 PROCEDURE — 97032 APPL MODALITY 1+ESTIM EA 15: CPT

## 2021-09-02 PROCEDURE — 63710000001 INSULIN ASPART PER 5 UNITS: Performed by: INTERNAL MEDICINE

## 2021-09-02 RX ADMIN — SERTRALINE 25 MG: 25 TABLET, FILM COATED ORAL at 09:04

## 2021-09-02 RX ADMIN — CARVEDILOL 12.5 MG: 6.25 TABLET, FILM COATED ORAL at 09:04

## 2021-09-02 RX ADMIN — OXYCODONE HYDROCHLORIDE AND ACETAMINOPHEN 500 MG: 500 TABLET ORAL at 09:04

## 2021-09-02 RX ADMIN — CARVEDILOL 12.5 MG: 6.25 TABLET, FILM COATED ORAL at 17:19

## 2021-09-02 RX ADMIN — INSULIN ASPART 6 UNITS: 100 INJECTION, SOLUTION INTRAVENOUS; SUBCUTANEOUS at 11:49

## 2021-09-02 RX ADMIN — KETOTIFEN FUMARATE 1 DROP: 0.35 SOLUTION/ DROPS OPHTHALMIC at 21:20

## 2021-09-02 RX ADMIN — APIXABAN 5 MG: 5 TABLET, FILM COATED ORAL at 21:20

## 2021-09-02 RX ADMIN — DOCUSATE SODIUM 50 MG AND SENNOSIDES 8.6 MG 1 TABLET: 8.6; 5 TABLET, FILM COATED ORAL at 09:04

## 2021-09-02 RX ADMIN — LINAGLIPTIN 5 MG: 5 TABLET, FILM COATED ORAL at 09:05

## 2021-09-02 RX ADMIN — PANTOPRAZOLE SODIUM 40 MG: 40 TABLET, DELAYED RELEASE ORAL at 05:32

## 2021-09-02 RX ADMIN — INSULIN ASPART 5 UNITS: 100 INJECTION, SOLUTION INTRAVENOUS; SUBCUTANEOUS at 17:20

## 2021-09-02 RX ADMIN — Medication 1 TABLET: at 09:04

## 2021-09-02 RX ADMIN — ATORVASTATIN CALCIUM 80 MG: 40 TABLET, FILM COATED ORAL at 21:20

## 2021-09-02 RX ADMIN — Medication 1000 MCG: at 09:04

## 2021-09-02 RX ADMIN — KETOTIFEN FUMARATE 1 DROP: 0.35 SOLUTION/ DROPS OPHTHALMIC at 09:05

## 2021-09-02 RX ADMIN — Medication 10 MG: at 21:20

## 2021-09-02 RX ADMIN — CHLORTHALIDONE 25 MG: 50 TABLET ORAL at 09:04

## 2021-09-02 RX ADMIN — KETOTIFEN FUMARATE 1 DROP: 0.35 SOLUTION/ DROPS OPHTHALMIC at 17:20

## 2021-09-02 RX ADMIN — INSULIN ASPART 2 UNITS: 100 INJECTION, SOLUTION INTRAVENOUS; SUBCUTANEOUS at 09:05

## 2021-09-02 RX ADMIN — LEVOTHYROXINE SODIUM 50 MCG: 50 TABLET ORAL at 05:32

## 2021-09-02 RX ADMIN — INSULIN DETEMIR 20 UNITS: 100 INJECTION, SOLUTION SUBCUTANEOUS at 21:20

## 2021-09-02 RX ADMIN — AMLODIPINE BESYLATE 10 MG: 10 TABLET ORAL at 09:04

## 2021-09-02 RX ADMIN — APIXABAN 5 MG: 5 TABLET, FILM COATED ORAL at 09:04

## 2021-09-02 RX ADMIN — AMLODIPINE BESYLATE 10 MG: 10 TABLET ORAL at 21:20

## 2021-09-02 RX ADMIN — INSULIN DETEMIR 20 UNITS: 100 INJECTION, SOLUTION SUBCUTANEOUS at 08:28

## 2021-09-02 RX ADMIN — DOCUSATE SODIUM 50 MG AND SENNOSIDES 8.6 MG 1 TABLET: 8.6; 5 TABLET, FILM COATED ORAL at 21:20

## 2021-09-02 NOTE — PROGRESS NOTES
PROGRESS NOTE         Patient Identification:  Name:  Joycelyn Brown  Age:  80 y.o.  Sex:  female  :  1940  MRN:  [id number]  Visit Number:  [id number]  Primary Care Provider:  Donya Looney APRN         LOS: 6 days       ----------------------------------------------------------------------------------------------------------------------  Subjective       Chief Complaints:    MCA CVA  Dysarthria  Dysphagia      Interval History:      Patient is awake this morning and seems to be comfortable.  Has no complaints and no evidence of acute distress.  Overnight vitals were reviewed without any fever and blood pressure seems to be well regulated.  Patient is alert and denies any chest pain, shortness of breath, headache, loss of consciousness, nausea, vomiting, diarrhea or abdominal pain.  Denies any difficulty participating with physical or occupational therapies and has been pleased with overall progress.  Patient is willing to participate with therapy this morning.        ----------------------------------------------------------------------------------------------------------------------      Objective       Current Hospital Meds:  amLODIPine, 10 mg, Oral, BID  apixaban, 5 mg, Oral, Q12H  ascorbic acid, 500 mg, Oral, Daily  atorvastatin, 80 mg, Oral, Nightly  carvedilol, 12.5 mg, Oral, BID With Meals  chlorthalidone, 25 mg, Oral, Daily  insulin aspart, 0-7 Units, Subcutaneous, TID AC  insulin detemir, 20 Units, Subcutaneous, Q12H  ketotifen, 1 drop, Both Eyes, TID  levothyroxine, 50 mcg, Oral, Q AM  linagliptin, 5 mg, Oral, Daily  melatonin, 10 mg, Oral, Nightly  multivitamin with minerals, 1 tablet, Oral, Daily  pantoprazole, 40 mg, Oral, Q AM  senna-docusate sodium, 1 tablet, Oral, BID  sertraline, 25 mg, Oral, Daily  vitamin B-12, 1,000 mcg, Oral, Daily         ----------------------------------------------------------------------------------------------------------------------    Vital  Signs:  Temp:  [97.5 °F (36.4 °C)-98.3 °F (36.8 °C)] 97.5 °F (36.4 °C)  Heart Rate:  [58-66] 58  Resp:  [18-20] 20  BP: (128-156)/(73-75) 156/75  No data found.  SpO2 Percentage    08/31/21 1900 09/01/21 0848 09/01/21 1900   SpO2: 99% 97% 98%     SpO2:  [97 %-98 %] 98 %  on  Flow (L/min):  [2-3] 2;   Device (Oxygen Therapy): nasal cannula    Body mass index is 28.19 kg/m².  Wt Readings from Last 3 Encounters:   08/27/21 81.6 kg (180 lb)   02/10/15 81.7 kg (180 lb 0.1 oz)        Intake/Output Summary (Last 24 hours) at 9/2/2021 0607  Last data filed at 9/2/2021 0458  Gross per 24 hour   Intake 1420 ml   Output 500 ml   Net 920 ml     Diet Soft Texture; Chopped; Nectar / Syrup Thick  ----------------------------------------------------------------------------------------------------------------------      Physical Exam:    General Appearance: alert, pleasant, interactive and cooperative.    Feels much better this morning with no complaints.     Head: Right-sided facial droop     Throat: no oral lesions, no thrush, oral mucosa moist and oopharynx normal     Neck: no adenopathy, supple, trachea midline, no thyromegaly, no carotid bruit  and no JVD     Lungs: clear to auscultation, respirations regular, respirations even and  respirations unlabored. No accessory muscle use.      Heart:: regular rhythm & normal rate, normal S1, S2, no murmur, no gallop, no  rub and no click.  Chest wall with no abnormalities observed. PMI nondisplaced     Abdomen: normal bowel sounds in all quadrants, soft non-tender, no guarding and no rebound tenderness     Extremities: no edema, no cyanosis, no redness, no tenderness, no clubbing     Musculoskeletal: joints with no effusion nor erythema nor warmth.  Pedal pulses palpable and equal bilaterally     Skin: no bleeding, bruising or rash and no lesions noted     Neurologic:               Mental Status: Patient is awake alert and oriented x2, dysarthria and dysphagia              Sensory:  Sensory decreased to the right side and right face              Motor strength: Right-sided facial droop and right sided hemiparesis      ----------------------------------------------------------------------------------------------------------------------            Results from last 7 days   Lab Units 09/01/21 0224 08/28/21  0129   WBC 10*3/mm3 10.98* 10.19   HEMOGLOBIN g/dL 13.7 13.2   HEMATOCRIT % 41.1 39.2   MCV fL 91.5 89.1   MCHC g/dL 33.3 33.7   PLATELETS 10*3/mm3 287 260     Results from last 7 days   Lab Units 09/02/21  0204 09/01/21 0223 08/28/21  0129   SODIUM mmol/L 135* 136 135*   POTASSIUM mmol/L 4.0 4.4 4.3   MAGNESIUM mg/dL  --   --  2.4   CHLORIDE mmol/L 97* 97* 100   CO2 mmol/L 28.4 30.3* 25.8   BUN mg/dL 28* 29* 22   CREATININE mg/dL 0.99 1.31* 1.04*   EGFR IF NONAFRICN AM mL/min/1.73 54* 39* 51*   CALCIUM mg/dL 9.8 9.5 8.9   GLUCOSE mg/dL 222* 215* 299*   ALBUMIN g/dL  --  3.21* 2.88*   BILIRUBIN mg/dL  --  0.3 0.2   ALK PHOS U/L  --  93 89   AST (SGOT) U/L  --  16 26   ALT (SGPT) U/L  --  18 28   Estimated Creatinine Clearance: 50.6 mL/min (by C-G formula based on SCr of 0.99 mg/dL).  No results found for: AMMONIA    Glucose   Date/Time Value Ref Range Status   09/01/2021 2041 307 (H) 70 - 130 mg/dL Final     Comment:     Meter: [id number] : [id number] Sebastian MCMULLEN   09/01/2021 1628 246 (H) 70 - 130 mg/dL Final     Comment:     Meter: [id number] : [id number] preet bocanegra   09/01/2021 1120 291 (H) 70 - 130 mg/dL Final     Comment:     Meter: [id number] : [id number] preet bocanegra   09/01/2021 0621 220 (H) 70 - 130 mg/dL Final     Comment:     Meter: [id number] : [id number] [place]   08/31/2021 1930 243 (H) 70 - 130 mg/dL Final     Comment:     Meter: [id number] : [id number] Enrique Crystal   08/31/2021 1612 158 (H) 70 - 130 mg/dL Final     Comment:     Meter: [id number] : [id number] GREG GUZMANA   08/31/2021 1116 340 (H) 70 - 130 mg/dL Final     Comment:      Meter: [id number] : [id number] Casie Naqvi   08/31/2021 0646 164 (H) 70 - 130 mg/dL Final     Comment:     Meter: [id number] : [id number] Jaspreet Lerma     No results found for: HGBA1C  No results found for: TSH, FREET4    No results found for: BLOODCX  No results found for: URINECX  No results found for: WOUNDCX  No results found for: STOOLCX  No results found for: RESPCX  Pain Management Panel    There is no flowsheet data to display.           ----------------------------------------------------------------------------------------------------------------------  Imaging Results (Last 24 Hours)     ** No results found for the last 24 hours. **          ----------------------------------------------------------------------------------------------------------------------    Assessment/Plan       Assessment/Plan     ASSESSMENT:    Acute left MCA pontine ischemic stroke  High-grade stenosis of left M1 segment  Moderate to severe left vertebral artery stenosis  Right hemiparesis  Dysarthria  Dysphagia  Hypertension  Hyperlipidemia  Acute respiratory failure requiring intubation  Aspiration pneumonia  Poorly controlled diabetes mellitus type 2  Hypothyroidism  GERD  UTI  Constipation  Depression      PLAN:    Patient is overall feeling much better this morning and her creatinine went back to normal after fluid bolus yesterday as she is not having enough water intake as she has lost her taste and does not like the thickened water.  Creatinine improved from 1.31 down to 0.99 and otherwise her labs are fairly unremarkable.  Patient has no complaints this morning.    Patient participated with physical therapy and Occupational Therapy as well as speech therapy and was at maximum assist with chair to bed and bed to chair transfers as well as maximum assist with sit to stand and stand to sit transfers with the use of wheelchair and was unable to walk or stand up and participated with hip knee and ankle exercises  with 2 sets and 10 repetitions.    Finished her course of aspirin and Plavix on August 31 and switched over to Eliquis 5 mg p.o. twice daily to continue that until her follow-up with neurology.    Estimated length of stay 21 days.     Prior living situation patient lived at home with family and was independent with all activities prior to her stroke.     Patient continues to require intensive inpatient physical therapy for therapeutic exercises, range of motion, endurance, gait training, safety, stairs training, strengthening for at least 1 to 2 hours a day for 5 to 6 days a week as well as occupational therapy for dressing, ADLs, feeding, home skills, safety and toileting techniques for 1 to 2 hours a day for 5 to 6 days a week, as well as speech and language pathology therapy for communication, expression, dysphasia, aspiration needs for 30 to 90 minutes a day for 5 to 6 days a week.        Code Status:   Code Status and Medical Interventions:   Ordered at: 08/27/21 1221     Code Status:    CPR     Medical Interventions (Level of Support Prior to Arrest):    Full       Joselito Hernandez MD  09/02/21  06:07 EDT

## 2021-09-02 NOTE — PROGRESS NOTES
Rehabilitation Nursing  Inpatient Rehabilitation Plan of Care Note    Plan of Care  Pain    Pain Management (Active)  Current Status (8/27/2021 10:38:00 AM): potential for pain  Weekly Goal: No pain this week  Discharge Goal: No pain    Safety    Potential for Injury (Active)  Current Status (8/27/2021 10:38:00 AM): At risk for injury  Weekly Goal: No injury this week  Discharge Goal: No injuries    Signed by: Marco Zamora RN

## 2021-09-02 NOTE — THERAPY TREATMENT NOTE
Inpatient Rehabilitation - Occupational Therapy Treatment Note     Tre     Patient Name: Joycelyn Brown  : 1940  MRN: [id number]    Today's Date: 2021                 Admit Date: 2021         ICD-10-CM ICD-9-CM   1. CVA (cerebral vascular accident) (CMS/[facility])  I63.9 434.91       Patient Active Problem List   Diagnosis   • CVA (cerebral vascular accident) (CMS/[facility])       History reviewed. No pertinent past medical history.    No past surgical history on file.             IRF OT ASSESSMENT FLOWSHEET (last 12 hours)      IRF OT Evaluation and Treatment     Row Name 21 1500 21 145       OT Time and Intention    Document Type  daily treatment  -  daily treatment  -    Mode of Treatment  individual therapy;occupational therapy  -  occupational therapy  -    Symptoms Noted During/After Treatment  --  fatigue  -    Row Name 21 1500 21 145       General Information    Patient/Family/Caregiver Comments/Observations  patient agreeable to therapy  -  agreeable to therapy  -    Existing Precautions/Restrictions  fall;oxygen therapy device and L/min  -  fall;oxygen therapy device and L/min;swallow precautions  -    Row Name 21 1500          Transfers    Bed-Chair [place] (Transfers)  maximum assist (25% patient effort);2 person assist;moderate assist (50% patient effort)  -     Row Name 21 1500 21 145       Motor Skills    Motor Skills  functional endurance;neuro-muscular function  -  --    Motor Control/Coordination Interventions  --  therapeutic exercise/ROM;neuro-muscular re-education RUE AA/PROM, NDT  -    Therapeutic Exercise  -- RUE ROM, neuro, vibration, AAROM arm skate, right grasp  -  -    Row Name 21 145          Neuromuscular Re-education    Interventions (Neuromuscular Re-education)  facilitation/inhibition;massage;tapping RUE facilitation  -     Row Name 21 145          Modality Intervention    Comment,  Modality Treatment  NMES applied to R wrist extensors X 15 mins w/good contraction and tolerance.  -     Row Name 09/02/21 1452          Positioning and Restraints    Post Treatment Position  wheelchair  -     In Wheelchair  with PT  -     Row Name 09/02/21 1452          Vital Signs    Intra SpO2 (%)  97  -     O2 Delivery Intra Treatment  supplemental O2  -       User Key  (r) = Recorded By, (t) = Taken By, (c) = Cosigned By    Initials Name Effective Dates     Lucrecia Andrews JOSE L, OT 06/16/21 -     Maria Guadalupe Tyler, OT 06/16/21 -            Occupational Therapy Education                 Title: PT OT SLP Therapies (Done)     Topic: Occupational Therapy (Done)     Point: ADL training (Done)     Description:   Instruct learner(s) on proper safety adaptation and remediation techniques during self care or transfers.   Instruct in proper use of assistive devices.              Learning Progress Summary           Patient Acceptance, E,D, VU,NR by  at 9/2/2021 1458    Acceptance, E,TB, VU by  at 9/1/2021 2256    Acceptance, E,D, VU,NR by  at 9/1/2021 1520    Acceptance, E,D, VU,NR by  at 8/31/2021 1541    Acceptance, E, VU,NR by  at 8/30/2021 1507    Acceptance, E,TB, VU by  at 8/28/2021 2040    Acceptance, E,D, VU,NR by AB at 8/28/2021 1410                   Point: Home exercise program (Done)     Description:   Instruct learner(s) on appropriate technique for monitoring, assisting and/or progressing therapeutic exercises/activities.              Learning Progress Summary           Patient Acceptance, E,TB, VU by BEVERLY at 9/1/2021 2256    Acceptance, E,TB, VU by DG at 8/28/2021 2040                   Point: Precautions (Done)     Description:   Instruct learner(s) on prescribed precautions during self-care and functional transfers.              Learning Progress Summary           Patient Acceptance, E,D, VU,NR by  at 9/2/2021 1458    Acceptance, E,TB, VU by BEVERLY at 9/1/2021 2256    Acceptance,  E,D, VU,NR by  at 9/1/2021 1520    Acceptance, E,D, VU,NR by  at 8/31/2021 1541    Acceptance, E, VU,NR by  at 8/30/2021 1507    Acceptance, E,TB, VU by  at 8/28/2021 2040    Acceptance, E,D, VU,NR by AB at 8/28/2021 1410                               User Key     Initials Effective Dates Name Provider Type Discipline    AB 06/16/21 -  Yazmin Chaves, OT Occupational Therapist OT     06/16/21 -  Melissa Swift, RN Registered Nurse Nurse     06/16/21 -  Debbie Mehta, OT Occupational Therapist OT     06/16/21 -  Lucrecia Andrews, OT Occupational Therapist OT                    OT Recommendation and Plan                         Time Calculation:     Time Calculation- OT     Row Name 09/02/21 1531 09/02/21 1458          Time Calculation- OT    OT Start Time  0915  -  1000  -     OT Stop Time  1000  -  1045  -     OT Time Calculation (min)  45 min  -  45 min  -     Total Timed Code Minutes- OT  --  45 minute(s)  -       User Key  (r) = Recorded By, (t) = Taken By, (c) = Cosigned By    Initials Name Provider Type     Lucrecia Andrews, OT Occupational Therapist     Maria Guadalupe Monge, OT Occupational Therapist        Therapy Charges for Today     Code Description Service Date Service Provider Modifiers Qty    70448939985 HC OT NEUROMUSC RE EDUCATION EA 15 MIN 9/1/2021 Maria Guadalupe Mnoge, OT GO, KX 3    27533597864 HC OT SELF CARE/MGMT/TRAIN EA 15 MIN 9/2/2021 Maria Guadalupe Monge OT GO, KX 1    05015410543 HC OT NEUROMUSC RE EDUCATION EA 15 MIN 9/2/2021 Maria Guadalupe Monge OT GO, KX 2                   Maria Guadalupe Monge OT  9/2/2021

## 2021-09-02 NOTE — THERAPY TREATMENT NOTE
"Inpatient Rehabilitation - Speech Language Pathology   Swallow Treatment Note  Tre     Patient Name: Joycelyn Brown  : 1940  MRN: [id number]  Today's Date: 2021               Admit Date: 2021    Visit Dx:     ICD-10-CM ICD-9-CM   1. CVA (cerebral vascular accident) (CMS/[facility])  I63.9 434.91     Patient Active Problem List   Diagnosis   • CVA (cerebral vascular accident) (CMS/[facility])     History reviewed. No pertinent past medical history.  No past surgical history on file.    Dysphagia Tx Note:      Ms. Brown is seen in pt room 106B of inpatient physical rehabilitation this pm for formal dysphagia tx.  She is cooperative to participate on this date.   She is noted w/ moderate fatigue and requires rest periods across therapy tasks.      Long Term Goal:  Pt will accept least restrictive diet tolerance w/o overt s/s aspiration.      Short Term Goals:  1. Pt will tolerate facial massage to R  Facial surface for 3-7 minutes to increase surface blood flow, sensation and ROM over 3 consecutive sessions.   *R mechanical facial massage for 7 Minutes w/ mildly increased surface blood flow.    2. Pt will perform OROM/MONAE exercises x3 sets x10 reps w/ min cues.  *x3 sets x10 reps w/ mod cues.     3. Pt will perform resistive breathing exercises x3 sets x5 reps w/resistance of 2-5  To improve respiratory support and control.   *x3 sets x10 reps w/ inhale/exhale level 2/2 w/ mod cues, fatigue effects noted.     4. Pt will perform laryngeal adduction/elevation exercises x3 sets x10 reps w/ min cues.   *not addressed on this date.    5. Pt will perform Shaker exercises sustained x3 sets x5 reps for 30+ seconds over 3 consecutive sessions.   *Not directly addressed today.      6. Pt will perform Shaker exercises repetitive x3 sets x12 reps w/ mod cues.   *x3 sets x10 reps     7. Pt will perform compensatory techniques during meals w/ min cues.  *Min cues required for \"small sips\" w/ thin liquids via cup across " session.  She is noted w/o incidents of oral loss from R side.        8. Pt will participate in instrumental re-evaluation of swallowing fnx in 7-10 days, pending progress towards this poc.  *S/p MBS 8/28/21 w/ recommendation for mechanical soft diet, nectar thick liquids, water protocol between meals. Continue modified po diet and tx per poc.     Thank you-  Maryam Jones M.S., [facility]-SLP      -------------------------------------------------------------------------------------------------------      DYSARTHRIA THERAPY PLAN OF CARE:     Ms. Brown is seen in the speech therapy office of inpatient physical rehabilitation this am for formal dysphagia tx.  She is cooperative to participate on this date.   She is noted w/ moderate fatigue and requires rest periods across therapy tasks.      Long Term Goal:  Pt will improve motor speech to allow for maximal communication and safety in adls.      Short Term Goals:  1. Pt will tolerate facial massage to R dacial surface for 3-7 minutes to increase surface blood flow, sensation and ROM over 3 consecutive sessions.   *R mechanical facial massage for 7 Minutes w/ mildly increased surface blood flow.    2. Pt will perform OROM/MONAE exercises x3 sets x10 reps w/ min cues.  *x3 sets x10 reps w/ mod cues.     3. Pt will perform resistive breathing exercises x3 sets x5 reps w/resistance of 2-5 to improve respiratory support and control.   *x3 sets x10 reps w/ inhale/exhale level 2/2 w/ mod cues, fatigue effects noted.     4. Pt will accurately produce multi-syllabic words 4/5 opp w/ min cues over 3 consecutive sessions.   *Not specifically addressed on this date.     5. Pt will overarticulate and hyperphonate in connected speech 4/5 opp w/ min cues over 3 consecutive sessions.   *Mod cues required for overarticulation and hyperphonation of sentences in 3/5 opp     6. Pt will improve intelligibility to 100% for both known/unknown context in conversation w/ min cues over 3 consecutive  sessions.   *approx 95% intelligible w/ unknown context and mod cues for decreased rate and overarticulation.      *Goals to be added/modified as necessary.      Thank you-  Maryam Jones M.S., CCC-SLP        SLP Recommendation and Plan  Continue modified po diet and tx per poc.     EDUCATION  The patient has been educated in the following areas:   Dysphagia (Swallowing Impairment) Modified Diet Instruction.     Time Calculation:   Time Calculation- SLP     Row Name 09/02/21 1456             Time Calculation- SLP    SLP Start Time  1400  -      SLP Stop Time  1445  -      SLP Time Calculation (min)  45 min  -      SLP - Next Appointment  09/03/21  -        User Key  (r) = Recorded By, (t) = Taken By, (c) = Cosigned By    Initials Name Provider Type    Maryam Adams MS, CFY-SLP Speech and Language Pathologist        Therapy Charges for Today     Code Description Service Date Service Provider Modifiers Qty    06067584315 HC ST TREATMENT SPEECH 1 9/1/2021 Maryam Jones MS, CFY-SLP GN, KX 1    79319968865 HC ST TREATMENT SWALLOW 2 9/1/2021 Maryam Jones MS, CFY-SLP GN, KX 1    64085685043 HC ST TREATMENT SPEECH 1 9/2/2021 Maryam Jones MS, CFY-SLP GN, KX 1    08954634911 HC ST TREATMENT SWALLOW 2 9/2/2021 Maryam Jones MS, CFY-SLP GN, KX 1        Patient was not wearing a face mask during this therapy encounter. Therapist used appropriate personal protective equipment including mask, eye protection and gloves.  Mask used was standard procedure mask. Appropriate PPE was worn during the entire therapy session. The patient did not cough during this evaluation. Therapist was within 6 feet for 15 minutes or more during the evaluation. Hand hygiene was completed before and after therapy session. Patient is not in enhanced droplet precautions.     Maryam Jones MS, CFY-SLP  9/2/2021

## 2021-09-02 NOTE — THERAPY TREATMENT NOTE
Inpatient Rehabilitation - Physical Therapy Treatment Note        Tre     Patient Name: Joycelyn Brown  : 1940  MRN: [id number]    Today's Date: 2021                    Admit Date: 2021      Visit Dx:     ICD-10-CM ICD-9-CM   1. CVA (cerebral vascular accident) (CMS/HCC)  I63.9 434.91       Patient Active Problem List   Diagnosis   • CVA (cerebral vascular accident) (CMS/HCC)       History reviewed. No pertinent past medical history.    No past surgical history on file.       PT ASSESSMENT (last 12 hours)      IRF PT Evaluation and Treatment     Row Name 21 1600          PT Time and Intention    Document Type  daily treatment  -RF     Mode of Treatment  physical therapy  -RF     Patient/Family/Caregiver Comments/Observations  Pt c/o fatigue with activity in AM and PM; frequent rest breaks required.   -RF     Row Name 21 1600          General Information    Patient Profile Reviewed  yes  -RF     Row Name 21 1600          Cognition/Psychosocial    Affect/Mental Status (Cognitive)  WFL  -RF     Orientation Status (Cognition)  oriented to;place;time  -RF     Follows Commands (Cognition)  WFL;follows one-step commands;over 90% accuracy  -RF     Row Name 21 1600          Bed Mobility    Bed Mobility  supine-sit;rolling left;rolling right;sit-supine  -RF     Supine-Sit [place] (Bed Mobility)  maximum assist (25% patient effort)  -RF     Sit-Supine [place] (Bed Mobility)  maximum assist (25% patient effort)  -RF     Row Name 21 1600          Transfer Assessment/Treatment    Transfers  sit-stand transfer;stand-sit transfer;stand pivot/stand step transfer  -     Row Name 21 1600          Transfers    Bed-Chair [place] (Transfers)  maximum assist (25% patient effort);2 person assist  -RF     Chair-Bed [place] (Transfers)  maximum assist (25% patient effort);2 person assist  -RF     Sit-Stand [place] (Transfers)  maximum assist (25% patient  effort);2 person assist  -RF     Stand-Sit [place] (Transfers)  maximum assist (25% patient effort);2 person assist;other (see comments) mod in parallel bars  -RF     Row Name 09/02/21 1600          Chair-Bed Transfer    Assistive Device (Chair-Bed Transfers)  wheelchair  -RF     Row Name 09/02/21 1600          Sit-Stand Transfer    Assistive Device (Sit-Stand Transfers)  wheelchair;other (see comments) Mod in parallel bars  -RF     Row Name 09/02/21 1600          Stand-Sit Transfer    Assistive Device (Stand-Sit Transfers)  wheelchair;other (see comments) parallel bars  -RF     Row Name 09/02/21 1600          Gait/Stairs (Locomotion)    [place] Level (Gait)  maximum assist (25% patient effort);2 person assist  -RF     Assistive Device (Gait)  other (see comments) parallel bars  -RF     Distance in Feet (Gait)  2'X3  -RF     Pattern (Gait)  step-to  -RF     Bilateral Gait Deviations  forward flexed posture;weight shift ability decreased  -RF     Right Sided Gait Deviations  knee buckling, right side;weight shift ability decreased  -RF     Comment (Gait/Stairs)  Patient requires cuing for upright posture and weightshifting to L; manual knee blocking and RLE advancement required.   -RF     Row Name 09/02/21 1600          Balance    Static Standing Balance  moderate impairment;supported;standing;other (see comments) parallel bars  -RF     Comment, Balance  Patient requires cuing for upright posture and COG alignment to L to maintain static standing balance; dynamic balance performed with gait training. Patient unable to align COG in center as increased forward flexed posture and R knee buckling is noted.   -RF     Row Name 09/02/21 1600          Hip (Therapeutic Exercise)    Hip Strengthening (Therapeutic Exercise)  bilateral;flexion;extension;aBduction;aDduction;marching while seated;sitting;2 sets;10 repetitions;other (see comments) AAROM on R  -RF     Row Name 09/02/21 1600          Knee (Therapeutic  Exercise)    Knee Strengthening (Therapeutic Exercise)  bilateral;flexion;extension;marching while seated;LAQ (long arc quad);hamstring curls;sitting;2 sets;10 repetitions;other (see comments) AAROM on R; pt able to perform LAW AROM  -RF     Row Name 09/02/21 1600          Ankle (Therapeutic Exercise)    Ankle Strengthening (Therapeutic Exercise)  bilateral;dorsiflexion;plantarflexion;sitting;2 sets;10 repetitions;other (see comments) AAROM on R; trace PF noted.   -RF     Row Name 09/02/21 1600          Modality Intervention (Comprehensive)    Modality Treatment  German stimulation  -RF     Row Name 09/02/21 1600          Australian Electrical Stimulation Treatment    Location 1 (Russian E-Stim Treatment)  lower extremity treatment area;other (see comments) R hip flexors  -RF     Indications (Russian E-Stim Treatment)  enhance muscle contraction  -RF     Treatment Details (Russian Electrical Stimulation Treatment)  15 mins, 10/20, 33 mA  -RF     Response to Treatment (Russian E-Stim Treatment)  muscle contraction enhanced No visible contraction noted; no skin changes noted.   -RF     Comment (Australian E-Stim Treatment)  Pt cued and AAROM for hip flexion with on cycle.   -RF     Row Name 09/02/21 1600          IRF PT Goals    Bed Mobility Goal Selection (PT-IRF)  bed mobility, PT goal 1  -RF     Transfer Goal Selection (PT-IRF)  transfers, PT goal 1  -RF     Gait (Walking Locomotion) Goal Selection (PT-IRF)  gait, PT goal 1  -RF     Stairs Goal Selection (PT-IRF)  stairs, PT goal 1  -RF     Row Name 09/02/21 1600          Bed Mobility Goal 1 (PT-IRF)    Activity/Assistive Device (Bed Mobility Goal 1, PT-IRF)  bed mobility activities, all  -RF     Assumption Level (Bed Mobility Goal 1, PT-IRF)  standby assist  -RF     Time Frame (Bed Mobility Goal 1, PT-IRF)  long-term goal (LTG)  -RF     Row Name 09/02/21 1600          Transfer Goal 1 (PT-IRF)    Activity/Assistive Device (Transfer Goal 1, PT-IRF)  all transfers  -RF      [place] Level (Transfer Goal 1, PT-IRF)  minimum assist (75% or more patient effort)  -RF     Time Frame (Transfer Goal 1, PT-IRF)  by discharge  -RF     Row Name 09/02/21 1600          Gait/Walking Locomotion Goal 1 (PT-IRF)    Activity/Assistive Device (Gait/Walking Locomotion Goal 1, PT-IRF)  gait (walking locomotion);assistive device use  -RF     Gait/Walking Locomotion Distance Goal 1 (PT-IRF)  5  -RF     [place] Level (Gait/Walking Locomotion Goal 1, PT-IRF)  minimum assist (75% or more patient effort)  -RF     Time Frame (Gait/Walking Locomotion Goal 1, PT-IRF)  long-term goal (LTG)  -RF     Row Name 09/02/21 1600          Stairs Goal 1 (PT-IRF)    Activity/Assistive Device (Stairs Goal 1, PT-IRF)  stairs, all skills;ascending stairs;descending stairs;using handrail, left  -RF     Number of Stairs (Stairs Goal 1, PT-IRF)  5  -RF     [place] Level (Stairs Goal 1, PT-IRF)  minimum assist (75% or more patient effort);moderate assist (50-74% patient effort)  -RF     Time Frame (Stairs Goal 1, PT-IRF)  long-term goal (LTG)  -RF     Row Name 09/02/21 1600          Positioning and Restraints    Pre-Treatment Position  in bed BID  -RF     In Bed  supine;call light within reach;encouraged to call for assist BID  -RF     Row Name 09/02/21 1600          Therapy Assessment/Plan (PT)    Rehab Potential/Prognosis (PT)  adequate, monitor progress closely  -RF     Frequency of Treatment (PT)  5 times per week  -RF     Estimated Duration of Therapy (PT)  2 weeks  -RF     Problem List (PT)  problems related to;balance;hemiparesis/hemiplegia;mobility;strength  -RF     Row Name 09/02/21 1600          Daily Progress Summary (PT)    Functional Goal Overall Progress (PT)  progressing toward functional goals as expected  -RF     Daily Progress Summary (PT)  Patient demonstrates improvements in functional mobility this date. Increased fear avoidance hinders progress with ambualtion and balance training. Continued  skilled care required for further strengtheing and neuromuscular activation to ensure maximum safe function upon D/C.   -RF     Impairments Still Limiting Function (PT)  ROM decreased;sensation decreased;strength decreased;impaired balance;impaired communication;impaired functional activity tolerance;motor control impaired;postural control impaired;sensory feedback impaired;pain  -RF     Recommendations (PT)  Continue per current POC  -RF       User Key  (r) = Recorded By, (t) = Taken By, (c) = Cosigned By    Initials Name Provider Type    RF Malinda Alicea PTA Physical Therapy Assistant           Physical Therapy Education                 Title: PT OT SLP Therapies (Done)     Topic: Physical Therapy (Done)     Point: Mobility training (Done)     Learning Progress Summary           Patient Acceptance, E,TB, VU by RF at 9/2/2021 1610    Acceptance, E,TB, VU by DG at 9/1/2021 2256    Acceptance, E,TB, VU by RF at 9/1/2021 1548    Acceptance, E,TB, VU by RF at 8/31/2021 1423    Acceptance, E,TB, VU by RF at 8/30/2021 1548                   Point: Home exercise program (Done)     Learning Progress Summary           Patient Acceptance, E,TB, VU by RF at 9/2/2021 1610    Acceptance, E,TB, VU by DG at 9/1/2021 2256    Acceptance, E,TB, VU by RF at 9/1/2021 1548    Acceptance, E,TB, VU by RF at 8/31/2021 1423    Acceptance, E,TB, VU by RF at 8/30/2021 1548                   Point: Body mechanics (Done)     Learning Progress Summary           Patient Acceptance, E,TB, VU by RF at 9/2/2021 1610    Acceptance, E,TB, VU by DG at 9/1/2021 2256    Acceptance, E,TB, VU by RF at 9/1/2021 1548    Acceptance, E,TB, VU by RF at 8/31/2021 1423    Acceptance, E,TB, VU by RF at 8/30/2021 1548                   Point: Precautions (Done)     Learning Progress Summary           Patient Acceptance, E,TB, VU by RF at 9/2/2021 1610    Acceptance, E,TB, VU by DG at 9/1/2021 2256    Acceptance, E,TB, VU by RF at 9/1/2021 1548    Acceptance,  E,TB, VU by RF at 8/31/2021 1423    Acceptance, E,TB, VU by RF at 8/30/2021 1548                               User Key     Initials Effective Dates Name Provider Type Discipline    DG 06/16/21 -  Melissa Swift, RN Registered Nurse Nurse     06/16/21 -  Malinda Alicea PTA Physical Therapy Assistant PT                PT Recommendation and Plan    Frequency of Treatment (PT): 5 times per week     Daily Progress Summary (PT)  Functional Goal Overall Progress (PT): progressing toward functional goals as expected  Daily Progress Summary (PT): Patient demonstrates improvements in functional mobility this date. Increased fear avoidance hinders progress with ambualtion and balance training. Continued skilled care required for further strengtheing and neuromuscular activation to ensure maximum safe function upon D/C.   Impairments Still Limiting Function (PT): ROM decreased, sensation decreased, strength decreased, impaired balance, impaired communication, impaired functional activity tolerance, motor control impaired, postural control impaired, sensory feedback impaired, pain  Recommendations (PT): Continue per current POC               Time Calculation:     PT Charges     Row Name 09/02/21 1611 09/02/21 1610          Time Calculation    Start Time  1300  -RF  1045  -RF     Stop Time  1335  -RF  1140  -RF     Time Calculation (min)  35 min  -RF  55 min  -RF     PT Received On  09/02/21  -RF  09/02/21  -RF     PT - Next Appointment  09/03/21  -RF  09/02/21  -RF     PT Goal Re-Cert Due Date  09/03/21  -RF  09/03/21  -RF        Time Calculation- PT    Total Timed Code Minutes- PT  35 minute(s)  -RF  55 minute(s)  -RF       User Key  (r) = Recorded By, (t) = Taken By, (c) = Cosigned By    Initials Name Provider Type    RF Malinda Alicea, CROW Physical Therapy Assistant          Therapy Charges for Today     Code Description Service Date Service Provider Modifiers Qty    75568281859 HC PT NEUROMUSC RE EDUCATION EA 15  MIN 9/1/2021 Malinda Alicea, PTA GP, KX 2    [id number]  PT THER PROC EA 15 MIN 9/1/2021 Malinda Alicea, PTA GP, KX 2    [id number]  PT THERAPEUTIC ACT EA 15 MIN 9/1/2021 Malinda Alicea, PTA GP, KX 2    [id number]  GAIT TRAINING EA 15 MIN 9/2/2021 Malinda Alicea, PTA GP, KX 2    [id number]  PT NEUROMUSC RE EDUCATION EA 15 MIN 9/2/2021 Malinda Alicea, PTA GP, KX 2    [id number]  PT THER PROC EA 15 MIN 9/2/2021 Malinda Alicea, PTA GP, KX 2                   Malindacasey Alicea, PTA  9/2/2021

## 2021-09-02 NOTE — THERAPY TREATMENT NOTE
Inpatient Rehabilitation - Occupational Therapy Treatment Note     Tre     Patient Name: Joycelyn Brown  : 1940  MRN: [id number]    Today's Date: 2021                 Admit Date: 2021         ICD-10-CM ICD-9-CM   1. CVA (cerebral vascular accident) (CMS/[facility])  I63.9 434.91       Patient Active Problem List   Diagnosis   • CVA (cerebral vascular accident) (CMS/[facility])       History reviewed. No pertinent past medical history.    No past surgical history on file.             IRF OT ASSESSMENT FLOWSHEET (last 12 hours)      IRF OT Evaluation and Treatment     Row Name 21 1452          OT Time and Intention    Document Type  daily treatment  -     Mode of Treatment  occupational therapy  -     Symptoms Noted During/After Treatment  fatigue  -     Row Name 21 1452          General Information    Patient/Family/Caregiver Comments/Observations  agreeable to therapy  -     Existing Precautions/Restrictions  fall;oxygen therapy device and L/min;swallow precautions  -     Row Name 21 1452          Motor Skills    Motor Control/Coordination Interventions  therapeutic exercise/ROM;neuro-muscular re-education RUE AA/PROM, NDT  -     Row Name 21 1452          Neuromuscular Re-education    Interventions (Neuromuscular Re-education)  facilitation/inhibition;massage;tapping RUE facilitation  -     Row Name 21 1452          Modality Intervention    Comment, Modality Treatment  NMES applied to R wrist extensors X 15 mins w/good contraction and tolerance.  -     Row Name 21 145          Positioning and Restraints    Post Treatment Position  wheelchair  -     In Wheelchair  with PT  -     Row Name 21 1452          Vital Signs    Intra SpO2 (%)  97  -     O2 Delivery Intra Treatment  supplemental O2  -       User Key  (r) = Recorded By, (t) = Taken By, (c) = Cosigned By    Initials Name Effective Dates     Lucrecia Andrews, OT 21 -             Occupational Therapy Education                 Title: PT OT SLP Therapies (Done)     Topic: Occupational Therapy (Done)     Point: ADL training (Done)     Description:   Instruct learner(s) on proper safety adaptation and remediation techniques during self care or transfers.   Instruct in proper use of assistive devices.              Learning Progress Summary           Patient Acceptance, E,D, VU,NR by  at 9/2/2021 1458    Acceptance, E,TB, VU by  at 9/1/2021 2256    Acceptance, E,D, VU,NR by  at 9/1/2021 1520    Acceptance, E,D, VU,NR by  at 8/31/2021 1541    Acceptance, E, VU,NR by  at 8/30/2021 1507    Acceptance, E,TB, VU by  at 8/28/2021 2040    Acceptance, E,D, VU,NR by AB at 8/28/2021 1410                   Point: Home exercise program (Done)     Description:   Instruct learner(s) on appropriate technique for monitoring, assisting and/or progressing therapeutic exercises/activities.              Learning Progress Summary           Patient Acceptance, E,TB, VU by  at 9/1/2021 2256    Acceptance, E,TB, VU by  at 8/28/2021 2040                   Point: Precautions (Done)     Description:   Instruct learner(s) on prescribed precautions during self-care and functional transfers.              Learning Progress Summary           Patient Acceptance, E,D, VU,NR by  at 9/2/2021 1458    Acceptance, E,TB, VU by  at 9/1/2021 2256    Acceptance, E,D, VU,NR by  at 9/1/2021 1520    Acceptance, E,D, VU,NR by  at 8/31/2021 1541    Acceptance, E, VU,NR by  at 8/30/2021 1507    Acceptance, E,TB, VU by  at 8/28/2021 2040    Acceptance, E,D, VU,NR by AB at 8/28/2021 1410                               User Key     Initials Effective Dates Name Provider Type Discipline    AB 06/16/21 -  Yazmin Chaves, OT Occupational Therapist OT     06/16/21 -  Melissa Swift, RN Registered Nurse Nurse     06/16/21 -  Debbie Mehta, OT Occupational Therapist OT     06/16/21 -  Lucrecia Andrews  JOSE L, OT Occupational Therapist OT                    OT Recommendation and Plan                         Time Calculation:     Time Calculation- OT     Row Name 09/02/21 1458             Time Calculation- OT    OT Start Time  1000  -      OT Stop Time  1045  -      OT Time Calculation (min)  45 min  -      Total Timed Code Minutes- OT  45 minute(s)  -        User Key  (r) = Recorded By, (t) = Taken By, (c) = Cosigned By    Initials Name Provider Type     Lucrecia Andrews, OT Occupational Therapist        Therapy Charges for Today     Code Description Service Date Service Provider Modifiers Qty    44053842035 HC OT NEUROMUSC RE EDUCATION EA 15 MIN 9/1/2021 Lucrecia Andrews, OT GO, KX 1    79328454076 HC OT ELEC STIM EA-PER 15 MIN 9/1/2021 Lucrecia Andrews, OT GO, KX 1    34031943438 HC OT SELF CARE/MGMT/TRAIN EA 15 MIN 9/1/2021 Lucrecia Andrews, OT GO, KX 1    79556303699 HC OT NEUROMUSC RE EDUCATION EA 15 MIN 9/2/2021 Lucrecia Andrews, OT GO, KX 2    77413397022 HC OT ELEC STIM EA-PER 15 MIN 9/2/2021 Lucrecia Andrews, OT GO, KX 1                   Lucrecia Andrews OT  9/2/2021

## 2021-09-03 ENCOUNTER — APPOINTMENT (OUTPATIENT)
Dept: GENERAL RADIOLOGY | Facility: HOSPITAL | Age: 81
End: 2021-09-03

## 2021-09-03 LAB
GLUCOSE BLDC GLUCOMTR-MCNC: 221 MG/DL (ref 70–130)
GLUCOSE BLDC GLUCOMTR-MCNC: 281 MG/DL (ref 70–130)
GLUCOSE BLDC GLUCOMTR-MCNC: 359 MG/DL (ref 70–130)
GLUCOSE BLDC GLUCOMTR-MCNC: 366 MG/DL (ref 70–130)

## 2021-09-03 PROCEDURE — 63710000001 INSULIN ASPART PER 5 UNITS: Performed by: INTERNAL MEDICINE

## 2021-09-03 PROCEDURE — 92611 MOTION FLUOROSCOPY/SWALLOW: CPT

## 2021-09-03 PROCEDURE — 97530 THERAPEUTIC ACTIVITIES: CPT

## 2021-09-03 PROCEDURE — 97112 NEUROMUSCULAR REEDUCATION: CPT

## 2021-09-03 PROCEDURE — 63710000001 INSULIN DETEMIR PER 5 UNITS: Performed by: INTERNAL MEDICINE

## 2021-09-03 PROCEDURE — 97110 THERAPEUTIC EXERCISES: CPT

## 2021-09-03 PROCEDURE — 74230 X-RAY XM SWLNG FUNCJ C+: CPT

## 2021-09-03 PROCEDURE — 74230 X-RAY XM SWLNG FUNCJ C+: CPT | Performed by: RADIOLOGY

## 2021-09-03 PROCEDURE — 97112 NEUROMUSCULAR REEDUCATION: CPT | Performed by: OCCUPATIONAL THERAPIST

## 2021-09-03 PROCEDURE — 97535 SELF CARE MNGMENT TRAINING: CPT | Performed by: OCCUPATIONAL THERAPIST

## 2021-09-03 PROCEDURE — 97530 THERAPEUTIC ACTIVITIES: CPT | Performed by: OCCUPATIONAL THERAPIST

## 2021-09-03 PROCEDURE — 97116 GAIT TRAINING THERAPY: CPT

## 2021-09-03 PROCEDURE — 82962 GLUCOSE BLOOD TEST: CPT

## 2021-09-03 RX ADMIN — BISACODYL 10 MG: 5 TABLET ORAL at 20:50

## 2021-09-03 RX ADMIN — SERTRALINE 25 MG: 25 TABLET, FILM COATED ORAL at 09:06

## 2021-09-03 RX ADMIN — CARVEDILOL 12.5 MG: 6.25 TABLET, FILM COATED ORAL at 09:06

## 2021-09-03 RX ADMIN — CHLORTHALIDONE 25 MG: 50 TABLET ORAL at 09:06

## 2021-09-03 RX ADMIN — KETOTIFEN FUMARATE 1 DROP: 0.35 SOLUTION/ DROPS OPHTHALMIC at 17:15

## 2021-09-03 RX ADMIN — KETOTIFEN FUMARATE 1 DROP: 0.35 SOLUTION/ DROPS OPHTHALMIC at 20:50

## 2021-09-03 RX ADMIN — DOCUSATE SODIUM 50 MG AND SENNOSIDES 8.6 MG 1 TABLET: 8.6; 5 TABLET, FILM COATED ORAL at 09:06

## 2021-09-03 RX ADMIN — APIXABAN 5 MG: 5 TABLET, FILM COATED ORAL at 20:51

## 2021-09-03 RX ADMIN — Medication 1 TABLET: at 09:06

## 2021-09-03 RX ADMIN — OXYCODONE HYDROCHLORIDE AND ACETAMINOPHEN 500 MG: 500 TABLET ORAL at 09:06

## 2021-09-03 RX ADMIN — INSULIN ASPART 6 UNITS: 100 INJECTION, SOLUTION INTRAVENOUS; SUBCUTANEOUS at 17:15

## 2021-09-03 RX ADMIN — AMLODIPINE BESYLATE 10 MG: 10 TABLET ORAL at 09:06

## 2021-09-03 RX ADMIN — PANTOPRAZOLE SODIUM 40 MG: 40 TABLET, DELAYED RELEASE ORAL at 05:58

## 2021-09-03 RX ADMIN — Medication 1000 MCG: at 09:06

## 2021-09-03 RX ADMIN — Medication 10 MG: at 20:50

## 2021-09-03 RX ADMIN — LEVOTHYROXINE SODIUM 50 MCG: 50 TABLET ORAL at 05:58

## 2021-09-03 RX ADMIN — DOCUSATE SODIUM 50 MG AND SENNOSIDES 8.6 MG 1 TABLET: 8.6; 5 TABLET, FILM COATED ORAL at 20:50

## 2021-09-03 RX ADMIN — ATORVASTATIN CALCIUM 80 MG: 40 TABLET, FILM COATED ORAL at 20:51

## 2021-09-03 RX ADMIN — LINAGLIPTIN 5 MG: 5 TABLET, FILM COATED ORAL at 09:06

## 2021-09-03 RX ADMIN — KETOTIFEN FUMARATE 1 DROP: 0.35 SOLUTION/ DROPS OPHTHALMIC at 09:06

## 2021-09-03 RX ADMIN — APIXABAN 5 MG: 5 TABLET, FILM COATED ORAL at 09:06

## 2021-09-03 RX ADMIN — INSULIN DETEMIR 20 UNITS: 100 INJECTION, SOLUTION SUBCUTANEOUS at 09:13

## 2021-09-03 RX ADMIN — INSULIN ASPART 4 UNITS: 100 INJECTION, SOLUTION INTRAVENOUS; SUBCUTANEOUS at 11:52

## 2021-09-03 RX ADMIN — CARVEDILOL 12.5 MG: 6.25 TABLET, FILM COATED ORAL at 17:15

## 2021-09-03 RX ADMIN — INSULIN DETEMIR 20 UNITS: 100 INJECTION, SOLUTION SUBCUTANEOUS at 20:51

## 2021-09-03 RX ADMIN — INSULIN ASPART 3 UNITS: 100 INJECTION, SOLUTION INTRAVENOUS; SUBCUTANEOUS at 09:05

## 2021-09-03 NOTE — THERAPY TREATMENT NOTE
Inpatient Rehabilitation - Occupational Therapy Treatment Note     Tre     Patient Name: Joycelyn Brown  : 1940  MRN: [id number]    Today's Date: 9/3/2021                 Admit Date: 2021         ICD-10-CM ICD-9-CM   1. CVA (cerebral vascular accident) (CMS/[facility])  I63.9 434.91       Patient Active Problem List   Diagnosis   • CVA (cerebral vascular accident) (CMS/[facility])       History reviewed. No pertinent past medical history.    No past surgical history on file.             IRF OT ASSESSMENT FLOWSHEET (last 12 hours)      [facility] OT Evaluation and Treatment     Row Name 21 1300          OT Time and Intention    Document Type  daily treatment  -     Mode of Treatment  individual therapy;occupational therapy  -     Patient Effort  good  -     Row Name 21 1300          General Information    Patient/Family/Caregiver Comments/Observations  agreeable to therapy  -     Existing Precautions/Restrictions  fall  -     Row Name 21 1300          Transfers    Bed-Chair [place] (Transfers)  maximum assist (25% patient effort);moderate assist (50% patient effort);2 person assist  -     Row Name 21 1300          Motor Skills    Motor Skills  coordination;functional endurance;therapeutic exercise  -     Therapeutic Exercise  --  gross grasp  -     Row Name 21 1300          Grooming    [place] Level (Grooming)  moderate assist (50% patient effort)  -       User Key  (r) = Recorded By, (t) = Taken By, (c) = Cosigned By    Initials Name Effective Dates     Maria Guadalupe Monge, OT 21 -            Occupational Therapy Education                 Title: PT OT SLP Therapies (Done)     Topic: Occupational Therapy (Done)     Point: ADL training (Done)     Description:   Instruct learner(s) on proper safety adaptation and remediation techniques during self care or transfers.   Instruct in proper use of assistive devices.              Learning Progress Summary            Patient Acceptance, E,TB, VU by DG at 9/2/2021 2003    Acceptance, E,D, VU,NR by  at 9/2/2021 1458    Acceptance, E,TB, VU by DG at 9/1/2021 2256    Acceptance, E,D, VU,NR by  at 9/1/2021 1520    Acceptance, E,D, VU,NR by  at 8/31/2021 1541    Acceptance, E, VU,NR by  at 8/30/2021 1507    Acceptance, E,TB, VU by  at 8/28/2021 2040    Acceptance, E,D, VU,NR by AB at 8/28/2021 1410                   Point: Home exercise program (Done)     Description:   Instruct learner(s) on appropriate technique for monitoring, assisting and/or progressing therapeutic exercises/activities.              Learning Progress Summary           Patient Acceptance, E,TB, VU by DG at 9/2/2021 2003    Acceptance, E,TB, VU by  at 9/1/2021 2256    Acceptance, E,TB, VU by  at 8/28/2021 2040                   Point: Precautions (Done)     Description:   Instruct learner(s) on prescribed precautions during self-care and functional transfers.              Learning Progress Summary           Patient Acceptance, E,TB, VU by  at 9/2/2021 2003    Acceptance, E,D, VU,NR by  at 9/2/2021 1458    Acceptance, E,TB, VU by  at 9/1/2021 2256    Acceptance, E,D, VU,NR by  at 9/1/2021 1520    Acceptance, E,D, VU,NR by  at 8/31/2021 1541    Acceptance, E, VU,NR by  at 8/30/2021 1507    Acceptance, E,TB, VU by  at 8/28/2021 2040    Acceptance, E,D, VU,NR by AB at 8/28/2021 1410                               User Key     Initials Effective Dates Name Provider Type Discipline    AB 06/16/21 -  Yazmin Chaves, OT Occupational Therapist OT     06/16/21 -  Melissa Switf, RN Registered Nurse Nurse     06/16/21 -  Debbie Mehta, OT Occupational Therapist OT     06/16/21 -  Lucrecia Andrews, OT Occupational Therapist OT                    OT Recommendation and Plan                         Time Calculation:     Time Calculation- OT     Row Name 09/03/21 1326             Time Calculation- OT    OT Start Time  0830   -      OT Stop Time  1000  -      OT Time Calculation (min)  90 min  -        User Key  (r) = Recorded By, (t) = Taken By, (c) = Cosigned By    Initials Name Provider Type     Maria Guadalupe Monge, OT Occupational Therapist        Therapy Charges for Today     Code Description Service Date Service Provider Modifiers Qty    97113482376 HC OT SELF CARE/MGMT/TRAIN EA 15 MIN 9/2/2021 Maria Guadalupe Monge, OT GO, KX 1    02544883628 HC OT NEUROMUSC RE EDUCATION EA 15 MIN 9/2/2021 Maria Guadalupe Monge, OT GO, KX 2    58992703573 HC OT SELF CARE/MGMT/TRAIN EA 15 MIN 9/3/2021 Maria Guadalupe Monge, OT GO, KX 2    78768097851 HC OT NEUROMUSC RE EDUCATION EA 15 MIN 9/3/2021 Maria Guadalupe Monge, OT GO, KX 3    [id number] HC OT THERAPEUTIC ACT EA 15 MIN 9/3/2021 Maria Guadalupe Monge, OT GO, KX 1                   Maria Guadalupe Monge, OT  9/3/2021

## 2021-09-03 NOTE — SIGNIFICANT NOTE
09/03/21 1324   Plan   Plan Pt's discharge is planned for 9-24-21.  Next team conference meeting will be on 9-7-21.  Will follow.

## 2021-09-03 NOTE — PROGRESS NOTES
PROGRESS NOTE         Patient Identification:  Name:  Joycelyn Brown  Age:  80 y.o.  Sex:  female  :  1940  MRN:  [id number]  Visit Number:  [id number]  Primary Care Provider:  Donya Looney APRN         LOS: 7 days       ----------------------------------------------------------------------------------------------------------------------  Subjective       Chief Complaints:    MCA CVA  Dysarthria  Dysphagia      Interval History:      Patient is awake and alert, resting in bed while awaiting therapy.  Denies any complaints or issues.  On 2 L nasal cannula, without any apparent distress.  Lung sounds clear to auscultation bilaterally.  Afebrile.  Vital signs stable.      ----------------------------------------------------------------------------------------------------------------------      Objective       Current Hospital Meds:  amLODIPine, 10 mg, Oral, BID  apixaban, 5 mg, Oral, Q12H  ascorbic acid, 500 mg, Oral, Daily  atorvastatin, 80 mg, Oral, Nightly  carvedilol, 12.5 mg, Oral, BID With Meals  chlorthalidone, 25 mg, Oral, Daily  insulin aspart, 0-7 Units, Subcutaneous, TID AC  insulin detemir, 20 Units, Subcutaneous, Q12H  ketotifen, 1 drop, Both Eyes, TID  levothyroxine, 50 mcg, Oral, Q AM  linagliptin, 5 mg, Oral, Daily  melatonin, 10 mg, Oral, Nightly  multivitamin with minerals, 1 tablet, Oral, Daily  pantoprazole, 40 mg, Oral, Q AM  senna-docusate sodium, 1 tablet, Oral, BID  sertraline, 25 mg, Oral, Daily  vitamin B-12, 1,000 mcg, Oral, Daily         ----------------------------------------------------------------------------------------------------------------------    Vital Signs:  Temp:  [97.6 °F (36.4 °C)-98.6 °F (37 °C)] 97.6 °F (36.4 °C)  Heart Rate:  [60-63] 60  Resp:  [18] 18  BP: (137-160)/(59-72) 137/59  Mean Arterial Pressure (Non-Invasive) for the past 24 hrs (Last 3 readings):   Noninvasive MAP (mmHg)   21 1639 105     SpO2 Percentage    21 0837  09/02/21 1912 09/03/21 0744   SpO2: 95% 98% 97%     SpO2:  [97 %-98 %] 97 %  on  Flow (L/min):  [2] 2;   Device (Oxygen Therapy): nasal cannula    Body mass index is 28.19 kg/m².  Wt Readings from Last 3 Encounters:   08/27/21 81.6 kg (180 lb)   02/10/15 81.7 kg (180 lb 0.1 oz)        Intake/Output Summary (Last 24 hours) at 9/3/2021 1149  Last data filed at 9/3/2021 0918  Gross per 24 hour   Intake 720 ml   Output 800 ml   Net -80 ml     Diet Soft Texture; Chopped; Nectar / Syrup Thick  ----------------------------------------------------------------------------------------------------------------------      Physical Exam:    General Appearance: alert, pleasant, interactive and cooperative.    Resting in bed, denies any complaints or issues.    Head: Right-sided facial droop     Throat: no oral lesions, no thrush, oral mucosa moist and oopharynx normal     Neck: no adenopathy, supple, trachea midline, no thyromegaly, no carotid bruit  and no JVD     Lungs: clear to auscultation, respirations regular, respirations even and  respirations unlabored. No accessory muscle use.      Heart:: regular rhythm & normal rate, normal S1, S2, no murmur, no gallop, no  rub and no click.  Chest wall with no abnormalities observed. PMI nondisplaced     Abdomen: normal bowel sounds in all quadrants, soft non-tender, no guarding and no rebound tenderness     Extremities: no edema, no cyanosis, no redness, no tenderness, no clubbing     Musculoskeletal: joints with no effusion nor erythema nor warmth.  Pedal pulses palpable and equal bilaterally     Skin: no bleeding, bruising or rash and no lesions noted     Neurologic:               Mental Status: Patient is awake alert and oriented x2, dysarthria and dysphagia              Sensory: Sensory decreased to the right side and right face              Motor strength: Right-sided facial droop and right sided  hemiparesis      ----------------------------------------------------------------------------------------------------------------------            Results from last 7 days   Lab Units 09/01/21 0224 08/28/21  0129   WBC 10*3/mm3 10.98* 10.19   HEMOGLOBIN g/dL 13.7 13.2   HEMATOCRIT % 41.1 39.2   MCV fL 91.5 89.1   MCHC g/dL 33.3 33.7   PLATELETS 10*3/mm3 287 260     Results from last 7 days   Lab Units 09/02/21  0204 09/01/21 0223 08/28/21  0129   SODIUM mmol/L 135* 136 135*   POTASSIUM mmol/L 4.0 4.4 4.3   MAGNESIUM mg/dL  --   --  2.4   CHLORIDE mmol/L 97* 97* 100   CO2 mmol/L 28.4 30.3* 25.8   BUN mg/dL 28* 29* 22   CREATININE mg/dL 0.99 1.31* 1.04*   EGFR IF NONAFRICN AM mL/min/1.73 54* 39* 51*   CALCIUM mg/dL 9.8 9.5 8.9   GLUCOSE mg/dL 222* 215* 299*   ALBUMIN g/dL  --  3.21* 2.88*   BILIRUBIN mg/dL  --  0.3 0.2   ALK PHOS U/L  --  93 89   AST (SGOT) U/L  --  16 26   ALT (SGPT) U/L  --  18 28   Estimated Creatinine Clearance: 50.6 mL/min (by C-G formula based on SCr of 0.99 mg/dL).  No results found for: AMMONIA    Glucose   Date/Time Value Ref Range Status   09/03/2021 1127 281 (H) 70 - 130 mg/dL Final     Comment:     Meter: [id number] : [id number] preet sahra   09/03/2021 0638 221 (H) 70 - 130 mg/dL Final     Comment:     Meter: [id number] : [id number] Sebastian Cervantes C   09/02/2021 2032 176 (H) 70 - 130 mg/dL Final     Comment:     Meter: [id number] : [id number] Miko Crystal   09/02/2021 1618 327 (H) 70 - 130 mg/dL Final     Comment:     RN Notified Meter: [id number] : [id number] LUCI RITTER   09/02/2021 1136 357 (H) 70 - 130 mg/dL Final     Comment:     Meter: [id number] : [id number] preet bocanegra   09/02/2021 0611 177 (H) 70 - 130 mg/dL Final     Comment:     Meter: [id number] : [id number] Jamison Torres   09/01/2021 2041 307 (H) 70 - 130 mg/dL Final     Comment:     Meter: [id number] : [id number] Sebastian Cervantes C   09/01/2021 1628 246 (H) 70 - 130 mg/dL Final      Comment:     Meter: [id number] : [id number] preet bocanegra     No results found for: HGBA1C  No results found for: TSH, FREET4    No results found for: BLOODCX  No results found for: URINECX  No results found for: WOUNDCX  No results found for: STOOLCX  No results found for: RESPCX  Pain Management Panel    There is no flowsheet data to display.           ----------------------------------------------------------------------------------------------------------------------  Imaging Results (Last 24 Hours)       Procedure Component Value Units Date/Time    FL Video Swallow Single Contrast [[id number]] Resulted: 09/03/21 1142     Updated: 09/03/21 1142            ----------------------------------------------------------------------------------------------------------------------    Assessment/Plan       Assessment/Plan     ASSESSMENT:    Acute left MCA pontine ischemic stroke  High-grade stenosis of left M1 segment  Moderate to severe left vertebral artery stenosis  Right hemiparesis  Dysarthria  Dysphagia  Hypertension  Hyperlipidemia  Acute respiratory failure requiring intubation  Aspiration pneumonia  Poorly controlled diabetes mellitus type 2  Hypothyroidism  GERD  UTI  Constipation  Depression      PLAN:    Patient is awake and alert, resting in bed while awaiting therapy.  Denies any complaints or issues.  On 2 L nasal cannula, without any apparent distress.  Lung sounds clear to auscultation bilaterally.  Afebrile.  Vital signs stable.    Participated with physical and occupational therapy on 9/2/2021: Requires maximal assist for bed mobility; maximal two-person assist for transfer activity; utilizes wheelchair for chair to bed, sit to stand, stand to sit transfer; ambulated 2 feet x 3 times in parallel bars with maximum 2 person assist.    Finished her course of aspirin and Plavix on August 31 and switched over to Eliquis 5 mg p.o. twice daily to continue that until her follow-up with  neurology.    Estimated length of stay 21 days.     Prior living situation patient lived at home with family and was independent with all activities prior to her stroke.     Patient continues to require intensive inpatient physical therapy for therapeutic exercises, range of motion, endurance, gait training, safety, stairs training, strengthening for at least 1 to 2 hours a day for 5 to 6 days a week as well as occupational therapy for dressing, ADLs, feeding, home skills, safety and toileting techniques for 1 to 2 hours a day for 5 to 6 days a week, as well as speech and language pathology therapy for communication, expression, dysphasia, aspiration needs for 30 to 90 minutes a day for 5 to 6 days a week.        Code Status:   Code Status and Medical Interventions:   Ordered at: 08/27/21 1221     Code Status:    CPR     Medical Interventions (Level of Support Prior to Arrest):    Full       Kortney Zamora, BETH  09/03/21  11:49 EDT

## 2021-09-03 NOTE — MBS/VFSS/FEES
Inpatient Rehabilitation - Speech Language Pathology   Swallow Initial Evaluation [facility] [place]   MODIFIED BARIUM SWALLOW STUDY     Patient Name: Joycelyn Brown  : 1940  MRN: [id number]  Today's Date: 9/3/2021           Admit Date: 2021      Joycelyn Brown  presents to the radiology suite this am from 106/2S to participate in an instrumental MBS to assess safety/efficacy of swallowing fnx, determine safest/least restrictive diet.  She was admitted on 21 w/ CVA.  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  Pt reports she has been at [facility] for above mentioned acute admission.  Pt reports participation in instrumental swallow evaluation during acute admission.      Pt is currently tolerating a modified po diet of mechanical soft solids, chopped meats, nectar thick liquids w/o complications s/p MBSS on 21.  Pt is dissatisfied w/ current thickened liquids but does understand necessity.  She has been participating in formal dysphagia tx.          Social History     Socioeconomic History   • Marital status:      Spouse name: Not on file   • Number of children: Not on file   • Years of education: Not on file   • Highest education level: Not on file   Tobacco Use   • Smoking status: Never Smoker   • Smokeless tobacco: Never Used        No recent chest xray available for review.     Diet Orders (active) (From admission, onward)     Start     Ordered    21 1127  Diet Soft Texture; Chopped; Nectar / Syrup Thick; Consistent Carbohydrate  Diet Effective Now      21 1127                She is observed on 2L NC w/o complications.     Risks and benefits of the procedure are explained w/  "verbalizing understanding/agreement to participate.     Pt is positioned upright and centered in a soft strap supportive chair to accept multiple po presentations of solid cracker, puree, honey thick, nectar thick, and thin liquids via spoon, cup and straw, along w/ whole placebo pill in puree. Pt is able to self feed w/ L hand only.      All views are from the lateral plane.     Facial/oral structures are slightly asymmetrical upon observation mild R facial droop evidenced w/o lingual deviation upon protrusion. Oral mucosa are moist, pink and clean. Secretions are clear, thin and well controlled. OROM/MONAE is generally weak to imitate oral postures. Gag is not assessed. Volitional cough is adequate in intensity, clear in quality, non-productive. Vocal quality is adequate in intensity, clear in quality w/ mildly dysarthric speech. Pt is a/a and cooperative to particpate.  Pt is oriented to person, place, and time, follows simple directives w/ some noted reminders, and participates in simple conversational exchanges.       Upon po presentations, adequate bolus anticipation w/ continued slightly weak labial seal for bolus clearance via spoon bowl and cup rim stability.  Bolus formation, manipulation, and control are generally weak w/ rotary mastication pattern. A-p transit is timely w/o significant oral residue. Tongue base retraction and linguavelar seal are mildly delayed w/ premature spillage to the valleculae w/ nectar liquids via cup w/ large bolus amount, and thin liquids via cup and straw presentations. Pt self administers large bolus size via cup despite initial verbal cues to limit bolus amount across evaluation.  Pt does adjust bolus presentation w/ subsequent presentations w/ verbal reminders for \"small sips\". No laryngeal penetration or aspiration is evidenced before the swallow.     Pharyngeal swallow is mildly delayed w/ weak hyolaryngeal elevation and epiglottic inversion. Transient penetration w/ nectar " via cup w/ self administered large bolus amount.  Penetration w/ aspiration of thin liquids via all presentations.  Compensatory strategy of head turn to R, result in continued penetration w/ aspiration. Spontaneous cough elicited, insufficient to clear completely.  Pharyngeal contraction is adequate w/o significant residue. No other laryngeal penetration or aspiration evidenced during or after the swallow.     Partial esophageal sweep reveals no mucosal abnormalities. Motility is wfl w/o retrograde flow noted.           Visit Dx:     ICD-10-CM ICD-9-CM   1. CVA (cerebral vascular accident) (CMS/[facility])  I63.9 434.91     Patient Active Problem List   Diagnosis   • CVA (cerebral vascular accident) (CMS/[facility])     History reviewed. No pertinent past medical history.  No past surgical history on file.     IMPRESSION:  Pt presents w/ mild oropharyngeal dysphagia w/ penetration and aspiration of thin liquids.  Pt requires cues for small sips across evaluation.       Compensatory strategies of head turn to R side yield continued aspiration of thin liquids during the swallow.     Pt is felt to most benefit from a continued modified po diet of mechanical soft solids, chopped meats, NECTAR thick liquids.  She is felt to benefit from participation in Water Protocol to aid w/ hydration and tolerance of thickened liquids.  She is felt to benefit from formal dysphagia tx to address noted oropharyngeal dysphagia.       SLP Recommendation and Plan       Recommendations:   1. Mechanical soft solids, chopped meats, NECTAR liquids  2. Meds whole in puree/NECTARS.   3. Mehran precautions.   4. Oral care protocol.   5. Water protocol  6. Formal dysphagia therapy  7. Universal aspiration precautions.     SLP to f/u for formal dysphagia tx and speech language cognitive evaluation.     D/w pt results and recommendations w/ verbal understanding and agreement.     D/w RN results and recommendations w/ verbal understanding and agreement.     Thank  you for allowing me to participate in the care of your patient-  Maryam Jones M.S., CCC-SLP          Patient was not wearing a face mask during this therapy encounter. Therapist used appropriate personal protective equipment including mask, eye protection and gloves.  Mask used was standard procedure mask. Appropriate PPE was worn during the entire therapy session. The patient coughed during this evaluation. Therapist was within 6 feet for 15 minutes or more during the evaluation. Hand hygiene was completed before and after therapy session. Patient is not in enhanced droplet precautions.              EDUCATION  The patient has been educated in the following areas:   Dysphagia (Swallowing Impairment) Oral Care/Hydration Modified Diet Instruction.              Time Calculation:   Time Calculation- SLP     Row Name 09/03/21 1447             Time Calculation- [facility]    SLP Start Time  1130  -      SLP Stop Time  1210  -      SLP Time Calculation (min)  40 min  -      SLP - Next Appointment  09/06/21  -        User Key  (r) = Recorded By, (t) = Taken By, (c) = Cosigned By    Initials Name Provider Type    Maryam Adams MS, CFY-SLP Speech and Language Pathologist          Therapy Charges for Today     Code Description Service Date Service Provider Modifiers Qty    27127991836 HC ST TREATMENT SPEECH 1 9/2/2021 Maryam Jones MS, CFAIDAN-SLP GN, KX 1    [id number] HC ST TREATMENT SWALLOW 2 9/2/2021 Maryam Jones MS, LOVE-SLP GN, KX 1    [id number] HC ST MOTION FLUORO EVAL SWALLOW 3 9/3/2021 Maryam Jones MS, LOVE-SLP GN, KX 1               Maryam Jones MS, LOVE-SLP  9/3/2021

## 2021-09-03 NOTE — THERAPY TREATMENT NOTE
Inpatient Rehabilitation - Physical Therapy Treatment Note        Tre     Patient Name: Joycelyn Brown  : 1940  MRN: [id number]    Today's Date: 9/3/2021                    Admit Date: 2021      Visit Dx:     ICD-10-CM ICD-9-CM   1. CVA (cerebral vascular accident) (CMS/HCC)  I63.9 434.91       Patient Active Problem List   Diagnosis   • CVA (cerebral vascular accident) (CMS/HCC)       History reviewed. No pertinent past medical history.    No past surgical history on file.       PT ASSESSMENT (last 12 hours)      IRF PT Evaluation and Treatment     Row Name 21 1528          PT Time and Intention    Document Type  progress note;daily treatment  -RF     Mode of Treatment  physical therapy  -RF     Patient/Family/Caregiver Comments/Observations  Patinet c/o weakness and fatigue with increased activity. Encouragement required as increased fear avoidance hinders progress.   -RF     Row Name 21 1528          General Information    Patient Profile Reviewed  yes  -RF     Row Name 21 1528          Cognition/Psychosocial    Affect/Mental Status (Cognitive)  WFL  -RF     Orientation Status (Cognition)  oriented to;place;time  -RF     Follows Commands (Cognition)  WFL;follows one-step commands;over 90% accuracy  -RF     Row Name 21 1528          Bed Mobility    Bed Mobility  supine-sit;rolling left;rolling right;sit-supine  -RF     Supine-Sit [place] (Bed Mobility)  maximum assist (25% patient effort)  -RF     Sit-Supine [place] (Bed Mobility)  maximum assist (25% patient effort)  -RF     Row Name 21 1528          Transfer Assessment/Treatment    Transfers  sit-stand transfer;stand-sit transfer;stand pivot/stand step transfer  -     Row Name 21 1528          Transfers    Bed-Chair [place] (Transfers)  maximum assist (25% patient effort);2 person assist  -RF     Chair-Bed [place] (Transfers)  maximum assist (25% patient effort);2 person assist  -RF      Sit-Stand [place] (Transfers)  maximum assist (25% patient effort);2 person assist  -RF     Stand-Sit [place] (Transfers)  maximum assist (25% patient effort);2 person assist;other (see comments) mod in parallel bars  -RF     Row Name 09/03/21 1528          Chair-Bed Transfer    Assistive Device (Chair-Bed Transfers)  wheelchair  -RF     Row Name 09/03/21 1528          Sit-Stand Transfer    Assistive Device (Sit-Stand Transfers)  wheelchair;other (see comments) Mod in parallel bars  -RF     Row Name 09/03/21 1528          Stand-Sit Transfer    Assistive Device (Stand-Sit Transfers)  wheelchair;other (see comments) parallel bars  -RF     Row Name 09/03/21 1528          Gait/Stairs (Locomotion)    [place] Level (Gait)  maximum assist (25% patient effort);2 person assist  -RF     Assistive Device (Gait)  other (see comments) parallel bars  -RF     Distance in Feet (Gait)  2'X3, 6'  -RF     Pattern (Gait)  step-to  -RF     Bilateral Gait Deviations  forward flexed posture;weight shift ability decreased  -RF     Right Sided Gait Deviations  knee buckling, right side;weight shift ability decreased  -RF     Comment (Gait/Stairs)  Cues and manual assistance provided for weightshifting, and knee extension on R.   -RF     Row Name 09/03/21 1528          Balance    Static Sitting Balance  mild impairment;unsupported;sitting, edge of mat  -RF     Dynamic Sitting Balance  moderate impairment;unsupported;sitting, edge of mat;asymmetrical weight shifting  -RF     Row Name 09/03/21 1528          Hip (Therapeutic Exercise)    Hip Strengthening (Therapeutic Exercise)  bilateral;flexion;extension;aBduction;aDduction;external rotation;internal rotation;heel slides;marching while seated;sitting;supine;resistance band;red;2 sets;10 repetitions;other (see comments) AAROM required on R  -RF     Row Name 09/03/21 1528          Knee (Therapeutic Exercise)    Knee Strengthening (Therapeutic Exercise)   bilateral;flexion;extension;heel slides;marching while seated;SLR (straight leg raise);SAQ (short arc quad);LAQ (long arc quad);hamstring curls;sitting;supine;resistance band;red;2 sets;10 repetitions  -RF     Row Name 09/03/21 1528          Ankle (Therapeutic Exercise)    Ankle Strengthening (Therapeutic Exercise)  bilateral;dorsiflexion;plantarflexion;sitting;supine;2 sets;10 repetitions  -RF     Row Name 09/03/21 1528          Modality Intervention (Comprehensive)    Modality Treatment  [place]izen [place] stimulation  -RF     Row Name 09/03/21 1528          IRF PT Goals    Bed Mobility Goal Selection (PT-IRF)  bed mobility, PT goal 1  -RF     Transfer Goal Selection (PT-IRF)  transfers, PT goal 1  -RF     Gait (Walking Locomotion) Goal Selection (PT-IRF)  gait, PT goal 1  -RF     Stairs Goal Selection (PT-IRF)  stairs, PT goal 1  -RF     Row Name 09/03/21 1528          Bed Mobility Goal 1 (PT-IRF)    Activity/Assistive Device (Bed Mobility Goal 1, PT-IRF)  bed mobility activities, all  -RF     [place] Level (Bed Mobility Goal 1, PT-IRF)  standby assist  -RF     Time Frame (Bed Mobility Goal 1, PT-IRF)  long-term goal (LTG)  -RF     Progress/Outcomes (Bed Mobility Goal 1, PT-IRF)  goal ongoing  -RF     Row Name 09/03/21 1528          Transfer Goal 1 (PT-IRF)    Activity/Assistive Device (Transfer Goal 1, PT-IRF)  all transfers  -RF     [place] Level (Transfer Goal 1, PT-IRF)  minimum assist (75% or more patient effort)  -RF     Time Frame (Transfer Goal 1, PT-IRF)  by discharge  -RF     Progress/Outcomes (Transfer Goal 1, PT-IRF)  goal ongoing  -RF     Row Name 09/03/21 1528          Gait/Walking Locomotion Goal 1 (PT-IRF)    Activity/Assistive Device (Gait/Walking Locomotion Goal 1, PT-IRF)  gait (walking locomotion);assistive device use  -RF     Gait/Walking Locomotion Distance Goal 1 (PT-IRF)  5  -RF     [place] Level (Gait/Walking Locomotion Goal 1, PT-IRF)  minimum assist (75% or more patient effort)  -RF      Time Frame (Gait/Walking Locomotion Goal 1, PT-IRF)  long-term goal (LTG)  -RF     Progress/Outcomes (Gait/Walking Locomotion Goal 1, PT-IRF)  goal ongoing  -RF     Row Name 09/03/21 1528          Stairs Goal 1 (PT-IRF)    Activity/Assistive Device (Stairs Goal 1, PT-IRF)  stairs, all skills;ascending stairs;descending stairs;using handrail, left  -RF     Number of Stairs (Stairs Goal 1, PT-IRF)  5  -RF     [place] Level (Stairs Goal 1, PT-IRF)  minimum assist (75% or more patient effort);moderate assist (50-74% patient effort)  -RF     Time Frame (Stairs Goal 1, PT-IRF)  long-term goal (LTG)  -RF     Progress/Outcomes (Stairs Goal 1, PT-IRF)  goal ongoing  -RF     Row Name 09/03/21 1528          Positioning and Restraints    Pre-Treatment Position  in bed chair in PM  -RF     In Bed  supine;call light within reach;encouraged to call for assist PM  -RF     In Wheelchair  sitting;with SLP AM  -RF     Row Name 09/03/21 1528          Therapy Assessment/Plan (PT)    Rehab Potential/Prognosis (PT)  adequate, monitor progress closely  -RF     Frequency of Treatment (PT)  5 times per week  -RF     Estimated Duration of Therapy (PT)  2 weeks  -RF     Problem List (PT)  problems related to;balance;hemiparesis/hemiplegia;mobility;strength  -RF     Row Name 09/03/21 1528          Daily Progress Summary (PT)    Functional Goal Overall Progress (PT)  progressing toward functional goals as expected  -RF     Daily Progress Summary (PT)  Pt conitnueds to require increased assistance with all functional mobility this date. COnitnued skilled care required for further improved neuromuscular activation and strengthening to ensure maximum safe function upon D/C.   -RF     Impairments Still Limiting Function (PT)  ROM decreased;sensation decreased;strength decreased;impaired balance;impaired communication;impaired functional activity tolerance;motor control impaired;postural control impaired;sensory feedback impaired;pain  -RF      Recommendations (PT)  Continue per current POC.   -RF       User Key  (r) = Recorded By, (t) = Taken By, (c) = Cosigned By    Initials Name Provider Type    Malinda Wang PTA Physical Therapy Assistant           Physical Therapy Education                 Title: PT OT SLP Therapies (Done)     Topic: Physical Therapy (Done)     Point: Mobility training (Done)     Learning Progress Summary           Patient Acceptance, E,TB, VU by RF at 9/3/2021 1532    Acceptance, E,TB, VU by DG at 9/2/2021 2003    Acceptance, E,TB, VU by RF at 9/2/2021 1610    Acceptance, E,TB, VU by DG at 9/1/2021 2256    Acceptance, E,TB, VU by RF at 9/1/2021 1548    Acceptance, E,TB, VU by RF at 8/31/2021 1423    Acceptance, E,TB, VU by RF at 8/30/2021 1548                   Point: Home exercise program (Done)     Learning Progress Summary           Patient Acceptance, E,TB, VU by RF at 9/3/2021 1532    Acceptance, E,TB, VU by DG at 9/2/2021 2003    Acceptance, E,TB, VU by RF at 9/2/2021 1610    Acceptance, E,TB, VU by DG at 9/1/2021 2256    Acceptance, E,TB, VU by RF at 9/1/2021 1548    Acceptance, E,TB, VU by RF at 8/31/2021 1423    Acceptance, E,TB, VU by RF at 8/30/2021 1548                   Point: Body mechanics (Done)     Learning Progress Summary           Patient Acceptance, E,TB, VU by RF at 9/3/2021 1532    Acceptance, E,TB, VU by DG at 9/2/2021 2003    Acceptance, E,TB, VU by RF at 9/2/2021 1610    Acceptance, E,TB, VU by DG at 9/1/2021 2256    Acceptance, E,TB, VU by RF at 9/1/2021 1548    Acceptance, E,TB, VU by RF at 8/31/2021 1423    Acceptance, E,TB, VU by RF at 8/30/2021 1548                   Point: Precautions (Done)     Learning Progress Summary           Patient Acceptance, E,TB, VU by RF at 9/3/2021 1532    Acceptance, E,TB, VU by DG at 9/2/2021 2003    Acceptance, E,TB, VU by RF at 9/2/2021 1610    Acceptance, E,TB, VU by DG at 9/1/2021 2256    Acceptance, E,TB, VU by RF at 9/1/2021 1548    Acceptance, E,TB, VU  by RF at 8/31/2021 1423    Acceptance, E,TB, VU by RF at 8/30/2021 1548                               User Key     Initials Effective Dates Name Provider Type Discipline    DG 06/16/21 -  Melissa Swift, RN Registered Nurse Nurse     06/16/21 -  Malinda Alicea PTA Physical Therapy Assistant PT                PT Recommendation and Plan    Frequency of Treatment (PT): 5 times per week     Daily Progress Summary (PT)  Functional Goal Overall Progress (PT): progressing toward functional goals as expected  Daily Progress Summary (PT): Pt conitnueds to require increased assistance with all functional mobility this date. COnitnued skilled care required for further improved neuromuscular activation and strengthening to ensure maximum safe function upon D/C.   Impairments Still Limiting Function (PT): ROM decreased, sensation decreased, strength decreased, impaired balance, impaired communication, impaired functional activity tolerance, motor control impaired, postural control impaired, sensory feedback impaired, pain  Recommendations (PT): Continue per current POC.                Time Calculation:     PT Charges     Row Name 09/03/21 1533 09/03/21 1532          Time Calculation    Start Time  1245  -RF  1045  -RF     Stop Time  1330  -RF  1130  -RF     Time Calculation (min)  45 min  -RF  45 min  -RF     PT Received On  09/03/21  -RF  09/03/21  -RF     PT - Next Appointment  09/06/21  -RF  09/03/21  -RF     PT Goal Re-Cert Due Date  09/10/21  -RF  09/10/21  -RF        Time Calculation- PT    Total Timed Code Minutes- PT  45 minute(s)  -RF  45 minute(s)  -RF       User Key  (r) = Recorded By, (t) = Taken By, (c) = Cosigned By    Initials Name Provider Type    RF Malinda Alicae PTA Physical Therapy Assistant          Therapy Charges for Today     Code Description Service Date Service Provider Modifiers Qty    10838041469 HC GAIT TRAINING EA 15 MIN 9/2/2021 Malinda Alicea PTA GP, KX 2    58425972734 HC PT  NEUROMUSC RE EDUCATION EA 15 MIN 9/2/2021 Malinda Alicea, PTA GP, KX 2    92540967042 HC PT THER PROC EA 15 MIN 9/2/2021 Malinda Alicea, PTA GP, KX 2    35778414570 HC GAIT TRAINING EA 15 MIN 9/3/2021 Malinda Alicea, PTA GP, KX 2    43811954367 HC PT NEUROMUSC RE EDUCATION EA 15 MIN 9/3/2021 Malinda Alicea, PTA GP, KX 1    [id number]  PT THER PROC EA 15 MIN 9/3/2021 Malinda Ailcea, PTA GP, KX 2    [id number]  PT THERAPEUTIC ACT EA 15 MIN 9/3/2021 Malinda Alicea, PTA GP, KX 1                   Malindacasey Alicea, PTA  9/3/2021

## 2021-09-03 NOTE — PROGRESS NOTES
Occupational Therapy:    Physical Therapy:    Speech Language Pathology: Individual: 40 minutes.    Signed by: MEET Chen

## 2021-09-03 NOTE — PROGRESS NOTES
Occupational Therapy: Individual: 90 minutes.    Physical Therapy:    Speech Language Pathology:    Signed by: Tiff Monge, Occupational Therapist

## 2021-09-04 LAB
GLUCOSE BLDC GLUCOMTR-MCNC: 195 MG/DL (ref 70–130)
GLUCOSE BLDC GLUCOMTR-MCNC: 241 MG/DL (ref 70–130)
GLUCOSE BLDC GLUCOMTR-MCNC: 315 MG/DL (ref 70–130)
GLUCOSE BLDC GLUCOMTR-MCNC: 368 MG/DL (ref 70–130)

## 2021-09-04 PROCEDURE — 63710000001 INSULIN DETEMIR PER 5 UNITS: Performed by: INTERNAL MEDICINE

## 2021-09-04 PROCEDURE — 63710000001 INSULIN ASPART PER 5 UNITS: Performed by: INTERNAL MEDICINE

## 2021-09-04 PROCEDURE — 82962 GLUCOSE BLOOD TEST: CPT

## 2021-09-04 RX ADMIN — LINAGLIPTIN 5 MG: 5 TABLET, FILM COATED ORAL at 10:16

## 2021-09-04 RX ADMIN — INSULIN ASPART 5 UNITS: 100 INJECTION, SOLUTION INTRAVENOUS; SUBCUTANEOUS at 11:43

## 2021-09-04 RX ADMIN — CHLORTHALIDONE 25 MG: 50 TABLET ORAL at 10:16

## 2021-09-04 RX ADMIN — DOCUSATE SODIUM 50 MG AND SENNOSIDES 8.6 MG 1 TABLET: 8.6; 5 TABLET, FILM COATED ORAL at 10:16

## 2021-09-04 RX ADMIN — INSULIN DETEMIR 20 UNITS: 100 INJECTION, SOLUTION SUBCUTANEOUS at 08:41

## 2021-09-04 RX ADMIN — APIXABAN 5 MG: 5 TABLET, FILM COATED ORAL at 10:17

## 2021-09-04 RX ADMIN — KETOTIFEN FUMARATE 1 DROP: 0.35 SOLUTION/ DROPS OPHTHALMIC at 20:38

## 2021-09-04 RX ADMIN — INSULIN DETEMIR 20 UNITS: 100 INJECTION, SOLUTION SUBCUTANEOUS at 21:34

## 2021-09-04 RX ADMIN — SERTRALINE 25 MG: 25 TABLET, FILM COATED ORAL at 10:16

## 2021-09-04 RX ADMIN — CHLORTHALIDONE 25 MG: 50 TABLET ORAL at 10:15

## 2021-09-04 RX ADMIN — INSULIN ASPART 2 UNITS: 100 INJECTION, SOLUTION INTRAVENOUS; SUBCUTANEOUS at 10:14

## 2021-09-04 RX ADMIN — Medication 1000 MCG: at 10:16

## 2021-09-04 RX ADMIN — KETOTIFEN FUMARATE 1 DROP: 0.35 SOLUTION/ DROPS OPHTHALMIC at 16:53

## 2021-09-04 RX ADMIN — DOCUSATE SODIUM 50 MG AND SENNOSIDES 8.6 MG 1 TABLET: 8.6; 5 TABLET, FILM COATED ORAL at 20:38

## 2021-09-04 RX ADMIN — KETOTIFEN FUMARATE 1 DROP: 0.35 SOLUTION/ DROPS OPHTHALMIC at 10:17

## 2021-09-04 RX ADMIN — LEVOTHYROXINE SODIUM 50 MCG: 50 TABLET ORAL at 05:07

## 2021-09-04 RX ADMIN — APIXABAN 5 MG: 5 TABLET, FILM COATED ORAL at 20:38

## 2021-09-04 RX ADMIN — INSULIN ASPART 3 UNITS: 100 INJECTION, SOLUTION INTRAVENOUS; SUBCUTANEOUS at 16:53

## 2021-09-04 RX ADMIN — PANTOPRAZOLE SODIUM 40 MG: 40 TABLET, DELAYED RELEASE ORAL at 05:07

## 2021-09-04 RX ADMIN — ATORVASTATIN CALCIUM 80 MG: 40 TABLET, FILM COATED ORAL at 20:38

## 2021-09-04 RX ADMIN — Medication 10 MG: at 20:38

## 2021-09-04 RX ADMIN — OXYCODONE HYDROCHLORIDE AND ACETAMINOPHEN 500 MG: 500 TABLET ORAL at 10:17

## 2021-09-04 RX ADMIN — Medication 1 TABLET: at 10:16

## 2021-09-04 NOTE — PROGRESS NOTES
Rehabilitation Nursing  Inpatient Rehabilitation Plan of Care Note    Plan of Care  Copy from POCMobility    [PT] Bed Mobility (Active)  Current Status (8/28/2021 12:00:00 AM): ModA  Weekly Goal: ModA/Alyssa  Discharge Goal: Alyssa    [PT] Stairs (Active)  Current Status (8/28/2021 12:00:00 AM): N/A  Weekly Goal: 1 step  Discharge Goal: 5 steps min/modA    [PT] Transfers (Active)  Current Status (8/28/2021 12:00:00 AM): Max/Dep  Weekly Goal: ModA/MaxA  Discharge Goal: ModA    [PT] Walk (Active)  Current Status (8/28/2021 12:00:00 AM): N/A  Weekly Goal: 2 Steps  Discharge Goal: 5 feet ModA    Self Care    [OT] Dressing (Upper) (Active)  Current Status (8/30/2021 12:00:00 AM): TotalA  Weekly Goal: TotalA/MaxA  Discharge Goal: ModA    Communication    [ST] Dysarthria (Active)  Current Status (8/31/2021 12:00:00 AM): Pt presents w/ a mild dysarthria  negatively impacting communication in adls.  Weekly Goal: R facial massage  Discharge Goal: Pt will improve motor speech to allow for maximal commmuncation  in adls.    Swallow Function    [ST] Swallowing (Active)  Current Status (8/31/2021 12:00:00 AM): Nectar thick liquids  Weekly Goal: Tolerate facial massage to R facial surface  Discharge Goal: Least restrictive po diet    Pain    [RN] Pain Management (Active)  Current Status (8/27/2021 10:38:00 AM): potential for pain  Weekly Goal: No pain this week  Discharge Goal: No pain    Safety    [RN] Potential for Injury (Active)  Current Status (8/27/2021 10:38:00 AM): At risk for injury  Weekly Goal: No injury this week  Discharge Goal: No injuries    Signed by: Nurse Parvin

## 2021-09-04 NOTE — PROGRESS NOTES
Rehabilitation Nursing  Inpatient Rehabilitation Plan of Care Note    Plan of Care  Copy from Justina    Pain Management (Active)  Current Status (8/27/2021 10:38:00 AM): potential for pain  Weekly Goal: No pain this week  Discharge Goal: No pain    Safety    Potential for Injury (Active)  Current Status (8/27/2021 10:38:00 AM): At risk for injury  Weekly Goal: No injury this week  Discharge Goal: No injuries    Signed by: Pari Monge, Nurse

## 2021-09-05 LAB
GLUCOSE BLDC GLUCOMTR-MCNC: 187 MG/DL (ref 70–130)
GLUCOSE BLDC GLUCOMTR-MCNC: 227 MG/DL (ref 70–130)
GLUCOSE BLDC GLUCOMTR-MCNC: 240 MG/DL (ref 70–130)
GLUCOSE BLDC GLUCOMTR-MCNC: 331 MG/DL (ref 70–130)

## 2021-09-05 PROCEDURE — 63710000001 INSULIN ASPART PER 5 UNITS: Performed by: INTERNAL MEDICINE

## 2021-09-05 PROCEDURE — 82962 GLUCOSE BLOOD TEST: CPT

## 2021-09-05 PROCEDURE — 63710000001 INSULIN DETEMIR PER 5 UNITS: Performed by: INTERNAL MEDICINE

## 2021-09-05 RX ADMIN — KETOTIFEN FUMARATE 1 DROP: 0.35 SOLUTION/ DROPS OPHTHALMIC at 17:01

## 2021-09-05 RX ADMIN — Medication 1 TABLET: at 08:27

## 2021-09-05 RX ADMIN — LEVOTHYROXINE SODIUM 50 MCG: 50 TABLET ORAL at 05:27

## 2021-09-05 RX ADMIN — DOCUSATE SODIUM 50 MG AND SENNOSIDES 8.6 MG 1 TABLET: 8.6; 5 TABLET, FILM COATED ORAL at 21:08

## 2021-09-05 RX ADMIN — OXYCODONE HYDROCHLORIDE AND ACETAMINOPHEN 500 MG: 500 TABLET ORAL at 08:27

## 2021-09-05 RX ADMIN — INSULIN ASPART 3 UNITS: 100 INJECTION, SOLUTION INTRAVENOUS; SUBCUTANEOUS at 11:37

## 2021-09-05 RX ADMIN — APIXABAN 5 MG: 5 TABLET, FILM COATED ORAL at 08:27

## 2021-09-05 RX ADMIN — PANTOPRAZOLE SODIUM 40 MG: 40 TABLET, DELAYED RELEASE ORAL at 05:26

## 2021-09-05 RX ADMIN — Medication 1000 MCG: at 08:27

## 2021-09-05 RX ADMIN — LINAGLIPTIN 5 MG: 5 TABLET, FILM COATED ORAL at 08:27

## 2021-09-05 RX ADMIN — APIXABAN 5 MG: 5 TABLET, FILM COATED ORAL at 21:08

## 2021-09-05 RX ADMIN — INSULIN ASPART 2 UNITS: 100 INJECTION, SOLUTION INTRAVENOUS; SUBCUTANEOUS at 08:28

## 2021-09-05 RX ADMIN — SERTRALINE 25 MG: 25 TABLET, FILM COATED ORAL at 08:27

## 2021-09-05 RX ADMIN — INSULIN ASPART 3 UNITS: 100 INJECTION, SOLUTION INTRAVENOUS; SUBCUTANEOUS at 17:01

## 2021-09-05 RX ADMIN — KETOTIFEN FUMARATE 1 DROP: 0.35 SOLUTION/ DROPS OPHTHALMIC at 21:08

## 2021-09-05 RX ADMIN — CHLORTHALIDONE 25 MG: 50 TABLET ORAL at 08:27

## 2021-09-05 RX ADMIN — ATORVASTATIN CALCIUM 80 MG: 40 TABLET, FILM COATED ORAL at 21:08

## 2021-09-05 RX ADMIN — INSULIN DETEMIR 20 UNITS: 100 INJECTION, SOLUTION SUBCUTANEOUS at 22:04

## 2021-09-05 RX ADMIN — INSULIN DETEMIR 20 UNITS: 100 INJECTION, SOLUTION SUBCUTANEOUS at 08:10

## 2021-09-05 RX ADMIN — DOCUSATE SODIUM 50 MG AND SENNOSIDES 8.6 MG 1 TABLET: 8.6; 5 TABLET, FILM COATED ORAL at 08:27

## 2021-09-05 RX ADMIN — Medication 10 MG: at 21:08

## 2021-09-05 RX ADMIN — KETOTIFEN FUMARATE 1 DROP: 0.35 SOLUTION/ DROPS OPHTHALMIC at 08:27

## 2021-09-05 NOTE — PLAN OF CARE
Goal Outcome Evaluation:            Pt is progressing with all therapies, continue with current plan of care.

## 2021-09-06 LAB
ALBUMIN SERPL-MCNC: 3.22 G/DL (ref 3.5–5.2)
ALBUMIN/GLOB SERPL: 1.1 G/DL
ALP SERPL-CCNC: 98 U/L (ref 39–117)
ALT SERPL W P-5'-P-CCNC: 15 U/L (ref 1–33)
ANION GAP SERPL CALCULATED.3IONS-SCNC: 8.7 MMOL/L (ref 5–15)
AST SERPL-CCNC: 17 U/L (ref 1–32)
BASOPHILS # BLD AUTO: 0.11 10*3/MM3 (ref 0–0.2)
BASOPHILS NFR BLD AUTO: 0.9 % (ref 0–1.5)
BILIRUB SERPL-MCNC: 0.3 MG/DL (ref 0–1.2)
BUN SERPL-MCNC: 29 MG/DL (ref 8–23)
BUN/CREAT SERPL: 25.7 (ref 7–25)
CALCIUM SPEC-SCNC: 9.8 MG/DL (ref 8.6–10.5)
CHLORIDE SERPL-SCNC: 99 MMOL/L (ref 98–107)
CO2 SERPL-SCNC: 30.3 MMOL/L (ref 22–29)
CREAT SERPL-MCNC: 1.13 MG/DL (ref 0.57–1)
DEPRECATED RDW RBC AUTO: 44.7 FL (ref 37–54)
EOSINOPHIL # BLD AUTO: 0.52 10*3/MM3 (ref 0–0.4)
EOSINOPHIL NFR BLD AUTO: 4.4 % (ref 0.3–6.2)
ERYTHROCYTE [DISTWIDTH] IN BLOOD BY AUTOMATED COUNT: 13.9 % (ref 12.3–15.4)
GFR SERPL CREATININE-BSD FRML MDRD: 46 ML/MIN/1.73
GLOBULIN UR ELPH-MCNC: 2.9 GM/DL
GLUCOSE BLDC GLUCOMTR-MCNC: 190 MG/DL (ref 70–130)
GLUCOSE BLDC GLUCOMTR-MCNC: 286 MG/DL (ref 70–130)
GLUCOSE BLDC GLUCOMTR-MCNC: 287 MG/DL (ref 70–130)
GLUCOSE BLDC GLUCOMTR-MCNC: 297 MG/DL (ref 70–130)
GLUCOSE SERPL-MCNC: 212 MG/DL (ref 65–99)
HCT VFR BLD AUTO: 40.6 % (ref 34–46.6)
HGB BLD-MCNC: 13.1 G/DL (ref 12–15.9)
IMM GRANULOCYTES # BLD AUTO: 0.04 10*3/MM3 (ref 0–0.05)
IMM GRANULOCYTES NFR BLD AUTO: 0.3 % (ref 0–0.5)
LYMPHOCYTES # BLD AUTO: 3 10*3/MM3 (ref 0.7–3.1)
LYMPHOCYTES NFR BLD AUTO: 25.7 % (ref 19.6–45.3)
MCH RBC QN AUTO: 28.7 PG (ref 26.6–33)
MCHC RBC AUTO-ENTMCNC: 32.3 G/DL (ref 31.5–35.7)
MCV RBC AUTO: 89 FL (ref 79–97)
MONOCYTES # BLD AUTO: 0.92 10*3/MM3 (ref 0.1–0.9)
MONOCYTES NFR BLD AUTO: 7.9 % (ref 5–12)
NEUTROPHILS NFR BLD AUTO: 60.8 % (ref 42.7–76)
NEUTROPHILS NFR BLD AUTO: 7.1 10*3/MM3 (ref 1.7–7)
NRBC BLD AUTO-RTO: 0 /100 WBC (ref 0–0.2)
PLATELET # BLD AUTO: 288 10*3/MM3 (ref 140–450)
PMV BLD AUTO: 10.6 FL (ref 6–12)
POTASSIUM SERPL-SCNC: 4 MMOL/L (ref 3.5–5.2)
PROT SERPL-MCNC: 6.1 G/DL (ref 6–8.5)
RBC # BLD AUTO: 4.56 10*6/MM3 (ref 3.77–5.28)
SODIUM SERPL-SCNC: 138 MMOL/L (ref 136–145)
WBC # BLD AUTO: 11.69 10*3/MM3 (ref 3.4–10.8)

## 2021-09-06 PROCEDURE — 82962 GLUCOSE BLOOD TEST: CPT

## 2021-09-06 PROCEDURE — 97110 THERAPEUTIC EXERCISES: CPT

## 2021-09-06 PROCEDURE — 80053 COMPREHEN METABOLIC PANEL: CPT | Performed by: INTERNAL MEDICINE

## 2021-09-06 PROCEDURE — 97112 NEUROMUSCULAR REEDUCATION: CPT

## 2021-09-06 PROCEDURE — 97530 THERAPEUTIC ACTIVITIES: CPT

## 2021-09-06 PROCEDURE — 92526 ORAL FUNCTION THERAPY: CPT

## 2021-09-06 PROCEDURE — 63710000001 INSULIN DETEMIR PER 5 UNITS: Performed by: INTERNAL MEDICINE

## 2021-09-06 PROCEDURE — 92507 TX SP LANG VOICE COMM INDIV: CPT

## 2021-09-06 PROCEDURE — 85025 COMPLETE CBC W/AUTO DIFF WBC: CPT | Performed by: INTERNAL MEDICINE

## 2021-09-06 PROCEDURE — 99232 SBSQ HOSP IP/OBS MODERATE 35: CPT | Performed by: FAMILY MEDICINE

## 2021-09-06 PROCEDURE — 63710000001 INSULIN ASPART PER 5 UNITS: Performed by: INTERNAL MEDICINE

## 2021-09-06 PROCEDURE — 97116 GAIT TRAINING THERAPY: CPT

## 2021-09-06 PROCEDURE — 97535 SELF CARE MNGMENT TRAINING: CPT

## 2021-09-06 RX ADMIN — APIXABAN 5 MG: 5 TABLET, FILM COATED ORAL at 20:52

## 2021-09-06 RX ADMIN — CHLORTHALIDONE 25 MG: 50 TABLET ORAL at 09:01

## 2021-09-06 RX ADMIN — Medication 1 TABLET: at 09:01

## 2021-09-06 RX ADMIN — Medication 10 MG: at 20:51

## 2021-09-06 RX ADMIN — KETOTIFEN FUMARATE 1 DROP: 0.35 SOLUTION/ DROPS OPHTHALMIC at 09:03

## 2021-09-06 RX ADMIN — LINAGLIPTIN 5 MG: 5 TABLET, FILM COATED ORAL at 09:01

## 2021-09-06 RX ADMIN — INSULIN ASPART 4 UNITS: 100 INJECTION, SOLUTION INTRAVENOUS; SUBCUTANEOUS at 12:04

## 2021-09-06 RX ADMIN — INSULIN ASPART 4 UNITS: 100 INJECTION, SOLUTION INTRAVENOUS; SUBCUTANEOUS at 17:21

## 2021-09-06 RX ADMIN — LEVOTHYROXINE SODIUM 50 MCG: 50 TABLET ORAL at 05:56

## 2021-09-06 RX ADMIN — INSULIN DETEMIR 20 UNITS: 100 INJECTION, SOLUTION SUBCUTANEOUS at 09:07

## 2021-09-06 RX ADMIN — KETOTIFEN FUMARATE 1 DROP: 0.35 SOLUTION/ DROPS OPHTHALMIC at 17:20

## 2021-09-06 RX ADMIN — ATORVASTATIN CALCIUM 80 MG: 40 TABLET, FILM COATED ORAL at 20:51

## 2021-09-06 RX ADMIN — OXYCODONE HYDROCHLORIDE AND ACETAMINOPHEN 500 MG: 500 TABLET ORAL at 09:01

## 2021-09-06 RX ADMIN — SERTRALINE 25 MG: 25 TABLET, FILM COATED ORAL at 09:01

## 2021-09-06 RX ADMIN — PANTOPRAZOLE SODIUM 40 MG: 40 TABLET, DELAYED RELEASE ORAL at 05:56

## 2021-09-06 RX ADMIN — KETOTIFEN FUMARATE 1 DROP: 0.35 SOLUTION/ DROPS OPHTHALMIC at 20:52

## 2021-09-06 RX ADMIN — DOCUSATE SODIUM 50 MG AND SENNOSIDES 8.6 MG 1 TABLET: 8.6; 5 TABLET, FILM COATED ORAL at 09:01

## 2021-09-06 RX ADMIN — CARVEDILOL 12.5 MG: 6.25 TABLET, FILM COATED ORAL at 17:20

## 2021-09-06 RX ADMIN — INSULIN DETEMIR 20 UNITS: 100 INJECTION, SOLUTION SUBCUTANEOUS at 21:44

## 2021-09-06 RX ADMIN — Medication 1000 MCG: at 09:01

## 2021-09-06 RX ADMIN — INSULIN ASPART 2 UNITS: 100 INJECTION, SOLUTION INTRAVENOUS; SUBCUTANEOUS at 09:04

## 2021-09-06 RX ADMIN — APIXABAN 5 MG: 5 TABLET, FILM COATED ORAL at 09:01

## 2021-09-06 NOTE — THERAPY TREATMENT NOTE
Inpatient Rehabilitation - Speech Language Pathology   Swallow Treatment Note [facility] [place]     Patient Name: Joycelyn Brown  : 1940  MRN: [id number]  Today's Date: 2021               Admit Date: 2021    Visit Dx:     ICD-10-CM ICD-9-CM   1. CVA (cerebral vascular accident) (CMS/[facility])  I63.9 434.91     Patient Active Problem List   Diagnosis   • CVA (cerebral vascular accident) (CMS/[facility])     History reviewed. No pertinent past medical history.  No past surgical history on file.    Dysphagia Tx Note:      Ms. Brown is seen in pt room 106B of inpatient physical rehabilitation this am and pm for formal dysphagia tx.  She is cooperative to participate on this date.   She is noted w/ moderate fatigue and requires rest periods across therapy tasks.  She is s/p MBS on 9/3/21 w/ recommendations to continue nectar thick liquids.       Long Term Goal:  Pt will accept least restrictive diet tolerance w/o overt s/s aspiration.      Short Term Goals:  1. Pt will tolerate facial massage to R  Facial surface for 3-7 minutes to increase surface blood flow, sensation and ROM over 3 consecutive sessions.   *AM: R mechanical facial massage for 7 Minutes w/ mildly increased surface blood flow.  *AM: R tactile massage for 2 minutes.   *PM: R mechanical facial massage for 6 minutes w/ mildly increased surface blood flow.    2. Pt will perform OROM/MONAE exercises x3 sets x10 reps w/ min cues.  *AM: x3 sets x10 reps w/ mod cues.   *PM: x3 sets x 10 reps w/ min cues.    3. Pt will perform resistive breathing exercises x3 sets x5 reps w/resistance of 2-5  To improve respiratory support and control.   *AM: x3 sets x10 reps w/ inhale/exhale level 3/3 w/ mod cues, fatigue effects noted.   *PM: x3 sets x10 reps w/ inhale/exhale level 3/3 w/ min cues.  Mild fatigue effects noted.     4. Pt will perform laryngeal adduction/elevation exercises x3 sets x10 reps w/ min cues.   *AM: Avg duration of 7.28 sec pre-breather x1 set x5  reps.  *AM: Avg duration of 7.43 sec post-breather x1 set x5 reps.  *PM: Avg duration of 6.14 sec post-breather x4 sets x5 reps.  Noted decrease in each rep duration across session.     5. Pt will perform Shaker exercises sustained x3 sets x5 reps for 30+ seconds over 3 consecutive sessions.   *Not addressed on this date.       6. Pt will perform Shaker exercises repetitive x3 sets x12 reps w/ mod cues.   *PM: x6 sets x5 reps w/ min cues.  Fatigue effects noted.     7. Pt will perform compensatory techniques during meals w/ min cues.  *Discussed compensatory techniques.         8. Pt will participate in instrumental re-evaluation of swallowing fnx in 7-10 days, pending progress towards this poc.  *pending MBS.    Thank you-  Maryam Jones M.S., [facility]SLP      -------------------------------------------------------------------------------------------------------      DYSARTHRIA THERAPY PLAN OF CARE:     Ms. Brown is seen at bedside of inpatient physical rehabilitation this am for formal dysphagia tx.  She is cooperative to participate on this date.   She is noted w/ moderate fatigue and requires rest periods across therapy tasks.      Long Term Goal:  Pt will improve motor speech to allow for maximal communication and safety in adls.      Short Term Goals:  1. Pt will tolerate facial massage to R dacial surface for 3-7 minutes to increase surface blood flow, sensation and ROM over 3 consecutive sessions.   *R mechanical facial massage for 7 Minutes w/ mildly increased surface blood flow.  *R tactile massage for 2 minutes.  *PM: R mechanical facial massage for 6 minutes w/ mildly increased surface blood flow.    2. Pt will perform OROM/MONAE exercises x3 sets x10 reps w/ min cues.  *x3 sets x10 reps w/ mod cues.   *PM: x3 sets x 10 reps w/ min cues.    3. Pt will perform resistive breathing exercises x3 sets x5 reps w/resistance of 2-5 to improve respiratory support and control.   *x3 sets x10 reps w/ inhale/exhale level  3/3 w/ mod cues, fatigue effects noted.   *PM: x3 sets x10 reps w/ inhale/exhale level 3/3 w/ min cues.  Mild fatigue effects noted.     4. Pt will accurately produce multi-syllabic words 4/5 opp w/ min cues over 3 consecutive sessions.   *PM: 4/5 opp w/ min cues.     5. Pt will overarticulate and hyperphonate in connected speech 4/5 opp w/ min cues over 3 consecutive sessions.   *PM: Min cues required in connected speech 3/5 opp      6. Pt will improve intelligibility to 100% for both known/unknown context in conversation w/ min cues over 3 consecutive sessions.   *PM: approx 95% intelligible w/ unknown context and min cues for decreased rate and overarticulation.      *Goals to be added/modified as necessary.      Thank you-  Maryam Jones M.S., CCC-SLP        SLP Recommendation and Plan  Continue modified po diet and tx per poc.     EDUCATION  The patient has been educated in the following areas:   Dysphagia (Swallowing Impairment) Modified Diet Instruction.     Time Calculation:   Time Calculation- SLP     Row Name 09/06/21 1325             Time Calculation- SLP    SLP Start Time  0830  -      SLP Stop Time  0915  -      SLP Time Calculation (min)  45 min  -      SLP - Next Appointment  09/07/21  -        User Key  (r) = Recorded By, (t) = Taken By, (c) = Cosigned By    Initials Name Provider Type    Maryam Adams MS, CFY-SLP Speech and Language Pathologist        Therapy Charges for Today     Code Description Service Date Service Provider Modifiers Qty    86994111972  ST TREATMENT SWALLOW 2 9/6/2021 Maryam Jones MS, CFY-SLP GN, KX 1    [id number] [facility] TREATMENT SPEECH 1 9/6/2021 Maryam Jones MS, CFY-SLP GN, KX 1        Patient was not wearing a face mask during this therapy encounter. Therapist used appropriate personal protective equipment including mask, eye protection and gloves.  Mask used was standard procedure mask. Appropriate PPE was worn during the entire therapy session. The patient did  not cough during this evaluation. Therapist was within 6 feet for 15 minutes or more during the evaluation. Hand hygiene was completed before and after therapy session. Patient is not in enhanced droplet precautions.     Maryam Jones MS, CFY-SLP  9/6/2021

## 2021-09-06 NOTE — PROGRESS NOTES
Inpatient Rehabilitation Functional Measures Assessment and Plan of Care    Plan of Care  Communication    [ST] Dysarthria(Active)  Current Status(09/07/2021): Pt presents w/ a mild dysarthria negatively  impacting communication in adls.  Weekly Goal(09/13/2021): R facial massage  Discharge Goal: Pt will improve motor speech to allow for maximal commmuncation  in adls.        Swallow Function    [ST] Swallowing(Active)  Current Status(09/07/2021): Nectar thick liquids  Weekly Goal(09/13/2021): Tolerate facial massage to R facial surface  Discharge Goal: Least restrictive po diet    Functional Measures  PAT Eating:  PAT Grooming:  PAT Bathing:  PAT Upper Body Dressing:  PAT Lower Body Dressing:  PAT Toileting:    PAT Bladder Management  Level of Assistance:  Frequency/Number of Accidents this Shift:    PAT Bowel Management  Level of Assistance:  Frequency/Number of Accidents this Shift:    PAT Bed/Chair/Wheelchair Transfer:  PAT Toilet Transfer:  PAT Tub/Shower Transfer:    Previously Documented Mode of Locomotion at Discharge:  [facility] Expected Mode of Locomotion at Discharge:  PAT Walk/Wheelchair:  PAT Stairs:    [facility] Comprehension:  PAT Expression:  PAT Social Interaction:  PAT Problem Solving:  PAT Memory:    Therapy Mode Minutes  Occupational Therapy:  Physical Therapy:  Speech Language Pathology: Individual: 85 minutes.    Discharge Functional Goals:    Signed by: MEET Chen

## 2021-09-06 NOTE — THERAPY TREATMENT NOTE
Inpatient Rehabilitation - Physical Therapy Treatment Note        [facility]     Patient Name: Joycelyn Brown  : 1940  MRN: [id number]    Today's Date: 2021                    Admit Date: 2021      Visit Dx:     ICD-10-CM ICD-9-CM   1. CVA (cerebral vascular accident) (CMS/HCC)  I63.9 434.91       Patient Active Problem List   Diagnosis   • CVA (cerebral vascular accident) (CMS/[facility])       History reviewed. No pertinent past medical history.    No past surgical history on file.       PT ASSESSMENT (last 12 hours)      IRF PT Evaluation and Treatment     Row Name 21 1546          PT Time and Intention    Document Type  daily treatment  -RF     Mode of Treatment  physical therapy  -RF     Patient/Family/Caregiver Comments/Observations  Patient c/o weakness and fatigue with increased activity. Fair functional mobility noted.   -     Row Name 21 1546          General Information    Patient Profile Reviewed  yes  -RF     Row Name 21 1546          Cognition/Psychosocial    Affect/Mental Status (Cognitive)  WFL  -RF     Orientation Status (Cognition)  oriented to;place;time  -RF     Follows Commands (Cognition)  WFL;follows one-step commands;over 90% accuracy  -     Row Name 21 1546          Bed Mobility    Bed Mobility  supine-sit;rolling left;rolling right;sit-supine  -RF     Supine-Sit [place] (Bed Mobility)  maximum assist (25% patient effort)  -RF     Sit-Supine [place] (Bed Mobility)  maximum assist (25% patient effort)  -     Row Name 21 1546          Transfer Assessment/Treatment    Transfers  sit-stand transfer;stand-sit transfer;stand pivot/stand step transfer;bed-chair transfer  -     Row Name 21 1546          Transfers    Bed-Chair [place] (Transfers)  2 person assist;moderate assist (50% patient effort);maximum assist (25% patient effort)  -RF     Chair-Bed [place] (Transfers)  2 person assist;moderate assist (50% patient  effort);maximum assist (25% patient effort)  -RF     Assistive Device (Bed-Chair Transfers)  wheelchair  -RF     Sit-Stand [place] (Transfers)  2 person assist;moderate assist (50% patient effort)  -RF     Stand-Sit [place] (Transfers)  2 person assist;other (see comments);moderate assist (50% patient effort) mod in parallel bars  -RF     Row Name 09/06/21 1546          Chair-Bed Transfer    Assistive Device (Chair-Bed Transfers)  wheelchair  -RF     Row Name 09/06/21 1546          Sit-Stand Transfer    Assistive Device (Sit-Stand Transfers)  wheelchair;other (see comments) Mod in parallel bars  -RF     Row Name 09/06/21 1546          Stand-Sit Transfer    Assistive Device (Stand-Sit Transfers)  wheelchair;other (see comments) parallel bars  -RF     Row Name 09/06/21 1546          Gait/Stairs (Locomotion)    [place] Level (Gait)  maximum assist (25% patient effort);2 person assist  -RF     Assistive Device (Gait)  other (see comments) parallel bars  -RF     Distance in Feet (Gait)  6'X3  -RF     Pattern (Gait)  step-to  -RF     Bilateral Gait Deviations  forward flexed posture;weight shift ability decreased  -RF     Right Sided Gait Deviations  knee buckling, right side;weight shift ability decreased  -RF     Comment (Gait/Stairs)  Mirror used for postural feedback; manual knee blocking required on RLE. Pt requires cues for technique and upright posture.   -RF     Row Name 09/06/21 1546          Hip (Therapeutic Exercise)    Hip Strengthening (Therapeutic Exercise)  bilateral;flexion;extension;aBduction;aDduction;marching while seated;sitting;resistance band;yellow;2 sets;10 repetitions;other (see comments) AAROM req for hip flexion  -RF     Row Name 09/06/21 1546          Knee (Therapeutic Exercise)    Knee Strengthening (Therapeutic Exercise)  bilateral;flexion;extension;marching while seated;LAQ (long arc quad);hamstring curls;sitting;resistance band;yellow;2 sets;10 repetitions  -RF     Row Name  09/06/21 1546          Ankle (Therapeutic Exercise)    Ankle Strengthening (Therapeutic Exercise)  bilateral;dorsiflexion;plantarflexion;sitting;2 sets;10 repetitions  -RF     Row Name 09/06/21 1546          Modality Intervention (Comprehensive)    Modality Treatment  [place] stimulation  -RF     Row Name 09/06/21 1546          [place] Electrical Stimulation Treatment    Location 1 ([place] E-Stim Treatment)  lower extremity treatment area;other (see comments) R hip flexors  -RF     Indications ([place] E-Stim Treatment)  enhance muscle contraction  -RF     Treatment Details ([place] Electrical Stimulation Treatment)  15 mins, 10/20, 55 mA  -RF     Response to Treatment ([place] E-Stim Treatment)  other (see comments) no visible contraction; no skin changes noted  -RF     Comment ([place] E-Stim Treatment)  Pt cued for seated march with on cycle; AAROM req   -RF     Row Name 09/06/21 1546          IRF PT Goals    Bed Mobility Goal Selection (PT-IRF)  bed mobility, PT goal 1  -RF     Transfer Goal Selection (PT-IRF)  transfers, PT goal 1  -RF     Gait (Walking Locomotion) Goal Selection (PT-IRF)  gait, PT goal 1  -RF     Stairs Goal Selection (PT-IRF)  stairs, PT goal 1  -RF     Row Name 09/06/21 1546          Bed Mobility Goal 1 (PT-IRF)    Activity/Assistive Device (Bed Mobility Goal 1, PT-IRF)  bed mobility activities, all  -RF     [place] Level (Bed Mobility Goal 1, PT-IRF)  standby assist  -RF     Time Frame (Bed Mobility Goal 1, PT-IRF)  long-term goal (LTG)  -RF     Progress/Outcomes (Bed Mobility Goal 1, PT-IRF)  goal ongoing  -RF     Row Name 09/06/21 1546          Transfer Goal 1 (PT-IRF)    Activity/Assistive Device (Transfer Goal 1, PT-IRF)  all transfers  -RF     [place] Level (Transfer Goal 1, PT-IRF)  minimum assist (75% or more patient effort)  -RF     Time Frame (Transfer Goal 1, PT-IRF)  by discharge  -RF     Progress/Outcomes (Transfer Goal 1, PT-IRF)  goal ongoing  -RF     Row Name  09/06/21 1546          Gait/Walking Locomotion Goal 1 (PT-IRF)    Activity/Assistive Device (Gait/Walking Locomotion Goal 1, PT-IRF)  gait (walking locomotion);assistive device use  -RF     Gait/Walking Locomotion Distance Goal 1 (PT-IRF)  5  -RF     [place] Level (Gait/Walking Locomotion Goal 1, PT-IRF)  minimum assist (75% or more patient effort)  -RF     Time Frame (Gait/Walking Locomotion Goal 1, PT-IRF)  long-term goal (LTG)  -RF     Progress/Outcomes (Gait/Walking Locomotion Goal 1, PT-IRF)  goal ongoing  -RF     Row Name 09/06/21 1546          Stairs Goal 1 (PT-IRF)    Activity/Assistive Device (Stairs Goal 1, PT-IRF)  stairs, all skills;ascending stairs;descending stairs;using handrail, left  -RF     Number of Stairs (Stairs Goal 1, PT-IRF)  5  -RF     [place] Level (Stairs Goal 1, PT-IRF)  minimum assist (75% or more patient effort);moderate assist (50-74% patient effort)  -RF     Time Frame (Stairs Goal 1, PT-IRF)  long-term goal (LTG)  -RF     Progress/Outcomes (Stairs Goal 1, PT-IRF)  goal ongoing  -RF     Row Name 09/06/21 1546          Positioning and Restraints    Pre-Treatment Position  sitting in chair/recliner BID  -RF     In Bed  supine;call light within reach;encouraged to call for assist BID  -RF     Row Name 09/06/21 1546          Therapy Assessment/Plan (PT)    Rehab Potential/Prognosis (PT)  adequate, monitor progress closely  -RF     Frequency of Treatment (PT)  5 times per week  -RF     Estimated Duration of Therapy (PT)  2 weeks  -RF     Problem List (PT)  problems related to;balance;hemiparesis/hemiplegia;mobility;strength  -RF     Row Name 09/06/21 1546          Daily Progress Summary (PT)    Functional Goal Overall Progress (PT)  progressing toward functional goals as expected  -RF     Daily Progress Summary (PT)  Patient demonstrates slight improvements in functional mobility this date as less assistance is required for st>pvt transferring. Pt Continues to demonstrate poor  neuromuscular activation on RLE/ R hip flexors. Conitnued skilled care required for further improvements to ensure maximum safe function upon D/C.   -RF     Impairments Still Limiting Function (PT)  ROM decreased;sensation decreased;strength decreased;impaired balance;impaired communication;impaired functional activity tolerance;motor control impaired;postural control impaired;sensory feedback impaired;pain  -RF     Recommendations (PT)  Conitnue per current POC   -RF       User Key  (r) = Recorded By, (t) = Taken By, (c) = Cosigned By    Initials Name Provider Type    RF Malinda Alicea, PTA Physical Therapy Assistant           Physical Therapy Education                 Title: PT OT SLP Therapies (Done)     Topic: Physical Therapy (Done)     Point: Mobility training (Done)     Learning Progress Summary           Patient Acceptance, E,TB, VU by RF at 9/6/2021 1557    Acceptance, E,TB, VU by RF at 9/3/2021 1532    Acceptance, E,TB, VU by DG at 9/2/2021 2003    Acceptance, E,TB, VU by RF at 9/2/2021 1610    Acceptance, E,TB, VU by DG at 9/1/2021 2256    Acceptance, E,TB, VU by RF at 9/1/2021 1548    Acceptance, E,TB, VU by RF at 8/31/2021 1423    Acceptance, E,TB, VU by RF at 8/30/2021 1548                   Point: Home exercise program (Done)     Learning Progress Summary           Patient Acceptance, E,TB, VU by RF at 9/6/2021 1557    Acceptance, E,TB, VU by RF at 9/3/2021 1532    Acceptance, E,TB, VU by DG at 9/2/2021 2003    Acceptance, E,TB, VU by RF at 9/2/2021 1610    Acceptance, E,TB, VU by DG at 9/1/2021 2256    Acceptance, E,TB, VU by RF at 9/1/2021 1548    Acceptance, E,TB, VU by RF at 8/31/2021 1423    Acceptance, E,TB, VU by RF at 8/30/2021 1548                   Point: Body mechanics (Done)     Learning Progress Summary           Patient Acceptance, E,TB, VU by RF at 9/6/2021 1557    Acceptance, E,TB, VU by RF at 9/3/2021 1532    Acceptance, E,TB, VU by BEVERLY at 9/2/2021 2003    Acceptance, E,TB, VU by DARREL  at 9/2/2021 1610    Acceptance, E,TB, VU by DG at 9/1/2021 2256    Acceptance, E,TB, VU by RF at 9/1/2021 1548    Acceptance, E,TB, VU by RF at 8/31/2021 1423    Acceptance, E,TB, VU by RF at 8/30/2021 1548                   Point: Precautions (Done)     Learning Progress Summary           Patient Acceptance, E,TB, VU by RF at 9/6/2021 1557    Acceptance, E,TB, VU by RF at 9/3/2021 1532    Acceptance, E,TB, VU by DG at 9/2/2021 2003    Acceptance, E,TB, VU by RF at 9/2/2021 1610    Acceptance, E,TB, VU by DG at 9/1/2021 2256    Acceptance, E,TB, VU by RF at 9/1/2021 1548    Acceptance, E,TB, VU by RF at 8/31/2021 1423    Acceptance, E,TB, VU by RF at 8/30/2021 1548                               User Key     Initials Effective Dates Name Provider Type Discipline     06/16/21 -  Melissa Swift, RN Registered Nurse Nurse    RF 06/16/21 -  Malinda Alicea PTA Physical Therapy Assistant PT                PT Recommendation and Plan    Frequency of Treatment (PT): 5 times per week     Daily Progress Summary (PT)  Functional Goal Overall Progress (PT): progressing toward functional goals as expected  Daily Progress Summary (PT): Patient demonstrates slight improvements in functional mobility this date as less assistance is required for st>pvt transferring. Pt Continues to demonstrate poor neuromuscular activation on RLE/ R hip flexors. Conitnued skilled care required for further improvements to ensure maximum safe function upon D/C.   Impairments Still Limiting Function (PT): ROM decreased, sensation decreased, strength decreased, impaired balance, impaired communication, impaired functional activity tolerance, motor control impaired, postural control impaired, sensory feedback impaired, pain  Recommendations (PT): Conitnue per current POC                Time Calculation:     PT Charges     Row Name 09/06/21 1558 09/06/21 1557          Time Calculation    Start Time  1340  -RF  1100  -RF     Stop Time  1425  -RF  1145   -RF     Time Calculation (min)  45 min  -RF  45 min  -RF     PT Received On  09/06/21  -RF  09/06/21  -RF     PT - Next Appointment  09/07/21  -RF  09/06/21  -RF     PT Goal Re-Cert Due Date  09/10/21  -RF  09/10/21  -RF        Time Calculation- PT    Total Timed Code Minutes- PT  45 minute(s)  -RF  45 minute(s)  -RF       User Key  (r) = Recorded By, (t) = Taken By, (c) = Cosigned By    Initials Name Provider Type    RF Malinda Alicea PTA Physical Therapy Assistant          Therapy Charges for Today     Code Description Service Date Service Provider Modifiers Qty    32319988304 HC GAIT TRAINING EA 15 MIN 9/6/2021 Malinda Alicea, PTA GP, KX 1    67958291570 HC PT NEUROMUSC RE EDUCATION EA 15 MIN 9/6/2021 Malinda Alicea, PTA GP, KX 2    28371590241 HC PT THER PROC EA 15 MIN 9/6/2021 Malinda Alicea, PTA GP, KX 1    07899102607 HC PT THERAPEUTIC ACT EA 15 MIN 9/6/2021 Malinda Alicea, PTA GP, KX 2                   Malinda Alicea PTA  9/6/2021

## 2021-09-06 NOTE — THERAPY TREATMENT NOTE
Inpatient Rehabilitation - Occupational Therapy Treatment Note    [facility] [place]     Patient Name: Joycelyn Brown  : 1940  MRN: [id number]    Today's Date: 2021                 Admit Date: 2021         ICD-10-CM ICD-9-CM   1. CVA (cerebral vascular accident) (CMS/[facility])  I63.9 434.91       Patient Active Problem List   Diagnosis   • CVA (cerebral vascular accident) (CMS/[facility])       History reviewed. No pertinent past medical history.    No past surgical history on file.             IRF OT ASSESSMENT FLOWSHEET (last 12 hours)      IRF OT Evaluation and Treatment     Row Name 21 1349          OT Time and Intention    Document Type  daily treatment  -LM     Mode of Treatment  occupational therapy  -LM     Patient Effort  adequate  -LM     Row Name 21 1347          General Information    Patient/Family/Caregiver Comments/Observations  Patient see this date for adl retraining/education, neuro re-ed, light TA/therex.  Patient requires max assist with bed mobility.  Max assist x 2 with fxl transfer to w/c.  Min assist with sitting balance on eob.  RUE prom, aarom tasks.  Demonstrates trace arom right shoulder hor abd/add, elbow flex/ext, and wrist flex/ext.  1/4 right fisting.  Gravity-eliminated arm skate, BUE self rom balloon vball.  -LM     Existing Precautions/Restrictions  fall;oxygen therapy device and L/min  -LM     Limitations/Impairments  sensory  -LM     Row Name 21 1342          Cognition/Psychosocial    Orientation Status (Cognition)  oriented to;person;place;situation;verbal cues/prompts needed for orientation  -LM     Follows Commands (Cognition)  follows one-step commands;delayed response/completion;increased processing time needed;initiation impaired;repetition of directions required;verbal cues/prompting required;physical/tactile prompts required  -LM     Row Name 21 9982          Positioning and Restraints    Post Treatment Position  wheelchair  -LM     In Wheelchair  call  light within reach;encouraged to call for assist;with PT  -LM       User Key  (r) = Recorded By, (t) = Taken By, (c) = Cosigned By    Initials Name Effective Dates    Nanette England, OT 06/16/21 -            Occupational Therapy Education                 Title: PT OT SLP Therapies (Done)     Topic: Occupational Therapy (Done)     Point: ADL training (Done)     Description:   Instruct learner(s) on proper safety adaptation and remediation techniques during self care or transfers.   Instruct in proper use of assistive devices.              Learning Progress Summary           Patient Acceptance, E,TB, VU by BEVERLY at 9/2/2021 2003    Acceptance, E,D, VU,NR by  at 9/2/2021 1458    Acceptance, E,TB, VU by  at 9/1/2021 2256    Acceptance, E,D, VU,NR by  at 9/1/2021 1520    Acceptance, E,D, VU,NR by  at 8/31/2021 1541    Acceptance, E, VU,NR by  at 8/30/2021 1507    Acceptance, E,TB, VU by  at 8/28/2021 2040    Acceptance, E,D, VU,NR by AB at 8/28/2021 1410                   Point: Home exercise program (Done)     Description:   Instruct learner(s) on appropriate technique for monitoring, assisting and/or progressing therapeutic exercises/activities.              Learning Progress Summary           Patient Acceptance, E,TB, VU by DG at 9/2/2021 2003    Acceptance, E,TB, VU by  at 9/1/2021 2256    Acceptance, E,TB, VU by  at 8/28/2021 2040                   Point: Precautions (Done)     Description:   Instruct learner(s) on prescribed precautions during self-care and functional transfers.              Learning Progress Summary           Patient Acceptance, E,TB, VU by BEVERLY at 9/2/2021 2003    Acceptance, E,D, VU,NR by  at 9/2/2021 1458    Acceptance, E,TB, VU by  at 9/1/2021 2256    Acceptance, E,D, VU,NR by  at 9/1/2021 1520    Acceptance, E,D, VU,NR by  at 8/31/2021 1541    Acceptance, E, VU,NR by  at 8/30/2021 1507    Acceptance, E,TB, VU by  at 8/28/2021 2040    Acceptance, E,JOSH, VU,NR by AB at  8/28/2021 1410                               User Key     Initials Effective Dates Name Provider Type Discipline    AB 06/16/21 -  Yazmin Chaves, OT Occupational Therapist OT    DG 06/16/21 -  Melissa Swift RN Registered Nurse Nurse     06/16/21 -  Debbie Mehta, OT Occupational Therapist OT    CJ 06/16/21 -  Lucrecia Andrews, OT Occupational Therapist OT                    OT Recommendation and Plan                         Time Calculation:     Time Calculation- OT     Row Name 09/06/21 1355 09/06/21 1354          Time Calculation- OT    OT Start Time  1245  -LM  0915  -LM     OT Stop Time  1330  -LM  1000  -LM     OT Time Calculation (min)  45 min  -LM  45 min  -LM     Total Timed Code Minutes- OT  45 minute(s)  -LM  45 minute(s)  -LM       User Key  (r) = Recorded By, (t) = Taken By, (c) = Cosigned By    Initials Name Provider Type    LM Nanette Farias, OT Occupational Therapist        Therapy Charges for Today     Code Description Service Date Service Provider Modifiers Qty    65880166887 HC OT SELF CARE/MGMT/TRAIN EA 15 MIN 9/6/2021 Nanette Farias, OT GO, KX 2    53083091104 HC OT NEUROMUSC RE EDUCATION EA 15 MIN 9/6/2021 Nanette Farias, OT GO, KX 2    24237981218 HC OT THERAPEUTIC ACT EA 15 MIN 9/6/2021 Nnaette Farias, OT GO, KX 2                   Nanette Farias, OT  9/6/2021

## 2021-09-06 NOTE — PROGRESS NOTES
Rehabilitation Nursing  Inpatient Rehabilitation Plan of Care Note    Plan of Care  Copy from Justina    Pain Management (Active)  Current Status (8/27/2021 10:38:00 AM): potential for pain  Weekly Goal: No pain this week  Discharge Goal: No pain    Safety    Potential for Injury (Active)  Current Status (8/27/2021 10:38:00 AM): At risk for injury  Weekly Goal: No injury this week  Discharge Goal: No injuries    Signed by: Luz Elena Wiggins, Nurse

## 2021-09-06 NOTE — PLAN OF CARE
Goal Outcome Evaluation:  Plan of Care Reviewed With: patient        Progress: improving  Outcome Summary: monitor progress with therapies

## 2021-09-06 NOTE — PROGRESS NOTES
[facility]     Patient Identification:  Name:  Joycelyn Brown  Age:  81 y.o.  Sex:  female  :  1940  MRN:  [id number]  Visit Number:  [id number]  ROOM: [facility]     Primary Care Provider:  Donya Looney APRN    Length of stay in inpatient status:  10    Subjective     Chief Compliant:  No chief complaint on file.      History of Presenting Illness:  Patient 81-year-old female with history of left MCA pontine CVA.  Patient was treated conservatively for this issue.  Patient has a fairly dense right-sided weakness.  Patient speech is improved but does have some mild expressive aphasia.  Patient states she just feels tired today and does not feel well but has no specific complaints.    Objective     Current Hospital Meds:amLODIPine, 10 mg, Oral, BID  apixaban, 5 mg, Oral, Q12H  ascorbic acid, 500 mg, Oral, Daily  atorvastatin, 80 mg, Oral, Nightly  carvedilol, 12.5 mg, Oral, BID With Meals  chlorthalidone, 25 mg, Oral, Daily  insulin aspart, 0-7 Units, Subcutaneous, TID AC  insulin detemir, 20 Units, Subcutaneous, Q12H  ketotifen, 1 drop, Both Eyes, TID  levothyroxine, 50 mcg, Oral, Q AM  linagliptin, 5 mg, Oral, Daily  melatonin, 10 mg, Oral, Nightly  multivitamin with minerals, 1 tablet, Oral, Daily  pantoprazole, 40 mg, Oral, Q AM  senna-docusate sodium, 1 tablet, Oral, BID  sertraline, 25 mg, Oral, Daily  vitamin B-12, 1,000 mcg, Oral, Daily       ----------------------------------------------------------------------------------------------------------------------  Vital Signs:  Temp:  [97.9 °F (36.6 °C)] 97.9 °F (36.6 °C)  Heart Rate:  [62-63] 63  Resp:  [18] 18  BP: (127-148)/(46-66) 144/60  SpO2:  [97 %] 97 %  on  Flow (L/min):  [3] 3;   Device (Oxygen Therapy): nasal cannula  Body mass index is 28.19 kg/m².    Wt Readings from Last 3 Encounters:   21 81.6 kg (180 lb)   02/10/15 81.7 kg (180 lb 0.1 oz)     Intake & Output (last 3 days)        0701 -  09/04 0700 09/04 0701 - 09/05 0700 09/05 0701 - 09/06 0700 09/06 0701 - 09/07 0700    P.O.      Total Intake(mL/kg) 770 (9.4) 840 (10.3) 1080 (13.2)     Urine (mL/kg/hr) 1100 (0.6) 1150 (0.6)      Stool  0      Total Output 1100 1150      Net -330 -310 +1080             Urine Unmeasured Occurrence 2 x  5 x     Stool Unmeasured Occurrence  2 x 3 x         Diet Soft Texture; Chopped; Nectar / Syrup Thick  ----------------------------------------------------------------------------------------------------------------------  Physical exam:  Constitutional:   Elderly female lying in bed no distress   HEENT: Normocephalic atraumatic  Neck: Supple   Cardiovascular: Regular rate and rhythm  Pulmonary/Chest: Clear to auscultation  Abdominal: Positive bowel sounds soft.   Musculoskeletal: No arthropathy  Neurological: Has 1 out of 5 strength in the right hand; 0-1 out of 5 strength in the right lower extremity.  Skin: No rash  Peripheral vascular:  Genitourinary:  ----------------------------------------------------------------------------------------------------------------------    Last echocardiogram:    ----------------------------------------------------------------------------------------------------------------------  Results from last 7 days   Lab Units 09/06/21 0111 09/01/21 0224   WBC 10*3/mm3 11.69* 10.98*   HEMOGLOBIN g/dL 13.1 13.7   HEMATOCRIT % 40.6 41.1   MCV fL 89.0 91.5   MCHC g/dL 32.3 33.3   PLATELETS 10*3/mm3 288 287         Results from last 7 days   Lab Units 09/06/21  0111 09/02/21  0204 09/01/21 0223   SODIUM mmol/L 138 135* 136   POTASSIUM mmol/L 4.0 4.0 4.4   CHLORIDE mmol/L 99 97* 97*   CO2 mmol/L 30.3* 28.4 30.3*   BUN mg/dL 29* 28* 29*   CREATININE mg/dL 1.13* 0.99 1.31*   EGFR IF NONAFRICN AM mL/min/1.73 46* 54* 39*   CALCIUM mg/dL 9.8 9.8 9.5   GLUCOSE mg/dL 212* 222* 215*   ALBUMIN g/dL 3.22*  --  3.21*   BILIRUBIN mg/dL 0.3  --  0.3   ALK PHOS U/L 98  --  93   AST (SGOT) U/L 17   --  16   ALT (SGPT) U/L 15  --  18   Estimated Creatinine Clearance: 43.6 mL/min (A) (by C-G formula based on SCr of 1.13 mg/dL (H)).  No results found for: AMMONIA              Glucose   Date/Time Value Ref Range Status   09/06/2021 0627 190 (H) 70 - 130 mg/dL Final     Comment:     Meter: [id number] : [id number] Latham Crystal   09/05/2021 2001 331 (H) 70 - 130 mg/dL Final     Comment:     Meter: [id number] : [id number] Latham Crystal   09/05/2021 1615 240 (H) 70 - 130 mg/dL Final     Comment:     RN Notified Meter: [id number] : [id number] LUCI RITTER   09/05/2021 1110 227 (H) 70 - 130 mg/dL Final     Comment:     RN Notified Meter: [id number] : [id number] LUCI RITTER   09/05/2021 0605 187 (H) 70 - 130 mg/dL Final     Comment:     Meter: [id number] : [id number] Monge Pari   09/04/2021 2008 368 (H) 70 - 130 mg/dL Final     Comment:     Meter: [id number] : [id number] Miko Crystal   09/04/2021 1611 241 (H) 70 - 130 mg/dL Final     Comment:     Meter: [id number] : [id number] Casie Codya   09/04/2021 1110 315 (H) 70 - 130 mg/dL Final     Comment:     Meter: [id number] : [id number] Casie Naqvi     No results found for: TSH, FREET4  No results found for: PREGTESTUR, PREGSERUM, HCG, HCGQUANT  Pain Management Panel    There is no flowsheet data to display.       Brief Urine Lab Results     None        No results found for: BLOODCX      No results found for: URINECX  No results found for: WOUNDCX  No results found for: STOOLCX        I have personally looked at the labs and they are summarized above.  ----------------------------------------------------------------------------------------------------------------------  Detailed radiology reports for the last 24 hours:    Imaging Results (Last 24 Hours)     ** No results found for the last 24 hours. **        Final impressions for the last 30 days of radiology reports:    FL Video Swallow Single Contrast    Result Date: 9/3/2021       Please see the speech pathologist's report for additional information.  This report was finalized on 9/3/2021 12:31 PM by Dr. Anthony Gresham MD.      FL Video Swallow Single Contrast    Result Date: 8/28/2021  1.  Penetration with aspiration of thin liquids. 2. Please see dysphasia notes for further details.  This report was finalized on 8/28/2021 11:49 AM by Dr. Obie Fleming MD.      I have personally looked at the radiology images and read the final radiology report.    Assessment & Plan    Status post left MCA pontine CVA with fairly dense right-sided hemiparesis--last week was requiring maximum assistance for up with transfers; ambulated 2 feet x 3 and 6 feet once with parallel bars and maximum assistance.  Has mild sitting balance impairment.  She was evaluated by speech therapy and is working with speech therapy at this time and the recommendation is for mechanical soft solids, chopped meats and nectar thickened liquids.  Requires moderate assistance for upper grooming.  Patient is on Eliquis and statin therapy.    Hypertension--continue Norvasc.  Patient also on Coreg and chlorthalidone.    Hypothyroidism continue Synthroid    Depression continue Zoloft        VTE Prophylaxis:   Mechanical Order History:      Ordered        08/27/21 1125  Maintain Sequential Compression Device  Continuous         08/27/21 1125  Place Sequential Compression Device  Once                 Pharmalogical Order History:      Ordered     Dose Route Frequency Stop    08/27/21 1125  apixaban (ELIQUIS) tablet 5 mg      5 mg PO Every 12 Hours Scheduled --                    Garrett Sneed MD  [facility]  09/06/21  09:40 EDT

## 2021-09-06 NOTE — PROGRESS NOTES
Inpatient Rehabilitation Functional Measures Assessment    Functional Measures  PAT Eating:  PAT Grooming:  PAT Bathing:  PAT Upper Body Dressing:  PAT Lower Body Dressing:  PAT Toileting:    PAT Bladder Management  Level of Assistance:  Frequency/Number of Accidents this Shift:    PAT Bowel Management  Level of Assistance:  Frequency/Number of Accidents this Shift:    PAT Bed/Chair/Wheelchair Transfer:  PAT Toilet Transfer:  PAT Tub/Shower Transfer:    Previously Documented Mode of Locomotion at Discharge:  [facility] Expected Mode of Locomotion at Discharge:  [facility] Walk/Wheelchair:  [facility] Stairs:    [facility] Comprehension:  [facility] Expression:  [facility] Social Interaction:  [facility] Problem Solving:  PAT Memory:    Therapy Mode Minutes  Occupational Therapy: Individual: 90 minutes.  Physical Therapy:  Speech Language Pathology:    Discharge Functional Goals:    Signed by: Nanette Farias, Occupational Therapist

## 2021-09-07 LAB
GLUCOSE BLDC GLUCOMTR-MCNC: 151 MG/DL (ref 70–130)
GLUCOSE BLDC GLUCOMTR-MCNC: 190 MG/DL (ref 70–130)
GLUCOSE BLDC GLUCOMTR-MCNC: 208 MG/DL (ref 70–130)
GLUCOSE BLDC GLUCOMTR-MCNC: 373 MG/DL (ref 70–130)
GLUCOSE BLDC GLUCOMTR-MCNC: 437 MG/DL (ref 70–130)

## 2021-09-07 PROCEDURE — 92507 TX SP LANG VOICE COMM INDIV: CPT

## 2021-09-07 PROCEDURE — 63710000001 INSULIN ASPART PER 5 UNITS: Performed by: INTERNAL MEDICINE

## 2021-09-07 PROCEDURE — 92526 ORAL FUNCTION THERAPY: CPT

## 2021-09-07 PROCEDURE — 82962 GLUCOSE BLOOD TEST: CPT

## 2021-09-07 PROCEDURE — 99231 SBSQ HOSP IP/OBS SF/LOW 25: CPT | Performed by: FAMILY MEDICINE

## 2021-09-07 PROCEDURE — 97112 NEUROMUSCULAR REEDUCATION: CPT | Performed by: OCCUPATIONAL THERAPIST

## 2021-09-07 PROCEDURE — 97530 THERAPEUTIC ACTIVITIES: CPT | Performed by: OCCUPATIONAL THERAPIST

## 2021-09-07 PROCEDURE — 97530 THERAPEUTIC ACTIVITIES: CPT

## 2021-09-07 PROCEDURE — 97535 SELF CARE MNGMENT TRAINING: CPT | Performed by: OCCUPATIONAL THERAPIST

## 2021-09-07 PROCEDURE — 97110 THERAPEUTIC EXERCISES: CPT

## 2021-09-07 PROCEDURE — 63710000001 INSULIN DETEMIR PER 5 UNITS: Performed by: INTERNAL MEDICINE

## 2021-09-07 PROCEDURE — 97116 GAIT TRAINING THERAPY: CPT

## 2021-09-07 PROCEDURE — 97112 NEUROMUSCULAR REEDUCATION: CPT

## 2021-09-07 RX ADMIN — INSULIN ASPART 7 UNITS: 100 INJECTION, SOLUTION INTRAVENOUS; SUBCUTANEOUS at 12:14

## 2021-09-07 RX ADMIN — AMLODIPINE BESYLATE 10 MG: 10 TABLET ORAL at 20:54

## 2021-09-07 RX ADMIN — LINAGLIPTIN 5 MG: 5 TABLET, FILM COATED ORAL at 08:17

## 2021-09-07 RX ADMIN — KETOTIFEN FUMARATE 1 DROP: 0.35 SOLUTION/ DROPS OPHTHALMIC at 08:18

## 2021-09-07 RX ADMIN — LEVOTHYROXINE SODIUM 50 MCG: 50 TABLET ORAL at 05:21

## 2021-09-07 RX ADMIN — DOCUSATE SODIUM 50 MG AND SENNOSIDES 8.6 MG 1 TABLET: 8.6; 5 TABLET, FILM COATED ORAL at 08:17

## 2021-09-07 RX ADMIN — Medication 1000 MCG: at 08:17

## 2021-09-07 RX ADMIN — INSULIN ASPART 2 UNITS: 100 INJECTION, SOLUTION INTRAVENOUS; SUBCUTANEOUS at 17:32

## 2021-09-07 RX ADMIN — Medication 10 MG: at 20:54

## 2021-09-07 RX ADMIN — ATORVASTATIN CALCIUM 80 MG: 40 TABLET, FILM COATED ORAL at 20:54

## 2021-09-07 RX ADMIN — PANTOPRAZOLE SODIUM 40 MG: 40 TABLET, DELAYED RELEASE ORAL at 05:21

## 2021-09-07 RX ADMIN — INSULIN DETEMIR 20 UNITS: 100 INJECTION, SOLUTION SUBCUTANEOUS at 21:52

## 2021-09-07 RX ADMIN — INSULIN DETEMIR 20 UNITS: 100 INJECTION, SOLUTION SUBCUTANEOUS at 08:20

## 2021-09-07 RX ADMIN — APIXABAN 5 MG: 5 TABLET, FILM COATED ORAL at 08:18

## 2021-09-07 RX ADMIN — OXYCODONE HYDROCHLORIDE AND ACETAMINOPHEN 500 MG: 500 TABLET ORAL at 08:18

## 2021-09-07 RX ADMIN — Medication 1 TABLET: at 08:17

## 2021-09-07 RX ADMIN — APIXABAN 5 MG: 5 TABLET, FILM COATED ORAL at 20:54

## 2021-09-07 RX ADMIN — DOCUSATE SODIUM 50 MG AND SENNOSIDES 8.6 MG 1 TABLET: 8.6; 5 TABLET, FILM COATED ORAL at 20:54

## 2021-09-07 RX ADMIN — KETOTIFEN FUMARATE 1 DROP: 0.35 SOLUTION/ DROPS OPHTHALMIC at 17:32

## 2021-09-07 RX ADMIN — KETOTIFEN FUMARATE 1 DROP: 0.35 SOLUTION/ DROPS OPHTHALMIC at 20:55

## 2021-09-07 RX ADMIN — INSULIN ASPART 2 UNITS: 100 INJECTION, SOLUTION INTRAVENOUS; SUBCUTANEOUS at 07:59

## 2021-09-07 RX ADMIN — CHLORTHALIDONE 25 MG: 50 TABLET ORAL at 08:17

## 2021-09-07 RX ADMIN — SERTRALINE 25 MG: 25 TABLET, FILM COATED ORAL at 08:17

## 2021-09-07 NOTE — PROGRESS NOTES
[facility]  PROGRESS NOTE     Patient Identification:  Name:  Joycelyn Brown  Age:  81 y.o.  Sex:  female  :  1940  MRN:  [id number]  Visit Number:  [id number]  ROOM: 110/1S     Primary Care Provider:  Donya Looney APRN    Length of stay in inpatient status:  11    Subjective     Chief Compliant:  No chief complaint on file.      History of Presenting Illness: 81-year-old female with a history of left MCA pontine CVA.  Patient has a fairly dense right-sided weakness does have some difficulty with swallowing as well as dysarthria.  Patient has fatigue but otherwise has no new complaints at this time other than constipation.    Objective     Current Hospital Meds:amLODIPine, 10 mg, Oral, BID  apixaban, 5 mg, Oral, Q12H  ascorbic acid, 500 mg, Oral, Daily  atorvastatin, 80 mg, Oral, Nightly  carvedilol, 12.5 mg, Oral, BID With Meals  chlorthalidone, 25 mg, Oral, Daily  insulin aspart, 0-7 Units, Subcutaneous, TID AC  insulin detemir, 20 Units, Subcutaneous, Q12H  ketotifen, 1 drop, Both Eyes, TID  levothyroxine, 50 mcg, Oral, Q AM  linagliptin, 5 mg, Oral, Daily  melatonin, 10 mg, Oral, Nightly  multivitamin with minerals, 1 tablet, Oral, Daily  pantoprazole, 40 mg, Oral, Q AM  senna-docusate sodium, 1 tablet, Oral, BID  sertraline, 25 mg, Oral, Daily  vitamin B-12, 1,000 mcg, Oral, Daily       ----------------------------------------------------------------------------------------------------------------------  Vital Signs:  Temp:  [97.7 °F (36.5 °C)] 97.7 °F (36.5 °C)  Heart Rate:  [60-92] 61  Resp:  [18] 18  BP: (137-151)/(50-74) 138/50  SpO2:  [96 %] 96 %  on  Flow (L/min):  [3] 3;   Device (Oxygen Therapy): nasal cannula  Body mass index is 28.19 kg/m².    Wt Readings from Last 3 Encounters:   21 81.6 kg (180 lb)   02/10/15 81.7 kg (180 lb 0.1 oz)     Intake & Output (last 3 days)       701 -  -  -  07 07 -  09/08 0700    P.O. 840 1080 1080 120    Total Intake(mL/kg) 840 (10.3) 1080 (13.2) 1080 (13.2) 120 (1.5)    Urine (mL/kg/hr) 1150 (0.6)       Stool 0       Total Output 1150       Net -310 +1080 +1080 +120            Urine Unmeasured Occurrence  5 x 5 x     Stool Unmeasured Occurrence 2 x 3 x 1 x         Diet Soft Texture; Chopped; Nectar / Syrup Thick  ----------------------------------------------------------------------------------------------------------------------  Physical exam:  Constitutional:   Elderly female in no distress  HEENT: Normocephalic atraumatic  Neck:    Supple  Cardiovascular: Regular rate and rhythm  Pulmonary/Chest: Clear to auscultation  Abdominal: Positive bowel sounds soft.   Musculoskeletal: No arthropathy  Neurological: 1 out of 5 strength in right upper extremity; 0-1 out of 5 strength in the right lower extremity  Skin: No rash  Peripheral vascular:  Genitourinary:  ----------------------------------------------------------------------------------------------------------------------    Last echocardiogram:    ----------------------------------------------------------------------------------------------------------------------  Results from last 7 days   Lab Units 09/06/21 0111 09/01/21 0224   WBC 10*3/mm3 11.69* 10.98*   HEMOGLOBIN g/dL 13.1 13.7   HEMATOCRIT % 40.6 41.1   MCV fL 89.0 91.5   MCHC g/dL 32.3 33.3   PLATELETS 10*3/mm3 288 287         Results from last 7 days   Lab Units 09/06/21  0111 09/02/21  0204 09/01/21 0223   SODIUM mmol/L 138 135* 136   POTASSIUM mmol/L 4.0 4.0 4.4   CHLORIDE mmol/L 99 97* 97*   CO2 mmol/L 30.3* 28.4 30.3*   BUN mg/dL 29* 28* 29*   CREATININE mg/dL 1.13* 0.99 1.31*   EGFR IF NONAFRICN AM mL/min/1.73 46* 54* 39*   CALCIUM mg/dL 9.8 9.8 9.5   GLUCOSE mg/dL 212* 222* 215*   ALBUMIN g/dL 3.22*  --  3.21*   BILIRUBIN mg/dL 0.3  --  0.3   ALK PHOS U/L 98  --  93   AST (SGOT) U/L 17  --  16   ALT (SGPT) U/L 15  --  18   Estimated Creatinine Clearance:  43.6 mL/min (A) (by C-G formula based on SCr of 1.13 mg/dL (H)).  No results found for: AMMONIA              Glucose   Date/Time Value Ref Range Status   09/07/2021 0617 151 (H) 70 - 130 mg/dL Final     Comment:     Meter: [id number] : [id number] URIEL BAPTISTE   09/06/2021 1901 297 (H) 70 - 130 mg/dL Final     Comment:     Meter: [id number] : [id number] URIEL BAPTISTE   09/06/2021 1624 286 (H) 70 - 130 mg/dL Final     Comment:     Meter: [id number] : [id number] Casie Naqvi   09/06/2021 1142 287 (H) 70 - 130 mg/dL Final     Comment:     Meter: [id number] : [id number] Casie Codya   09/06/2021 0627 190 (H) 70 - 130 mg/dL Final     Comment:     Meter: [id number] : [id number] [place] Crystal   09/05/2021 2001 331 (H) 70 - 130 mg/dL Final     Comment:     Meter: [id number] : [id number] Enrique Crystal   09/05/2021 1615 240 (H) 70 - 130 mg/dL Final     Comment:     RN Notified Meter: [id number] : [id number] LUCI RITTER   09/05/2021 1110 227 (H) 70 - 130 mg/dL Final     Comment:     RN Notified Meter: [id number] : [id number] LUCIDONALD RITTER     No results found for: TSH, FREET4  No results found for: PREGTESTUR, PREGSERUM, HCG, HCGQUANT  Pain Management Panel    There is no flowsheet data to display.       Brief Urine Lab Results     None        No results found for: BLOODCX      No results found for: URINECX  No results found for: WOUNDCX  No results found for: STOOLCX        I have personally looked at the labs and they are summarized above.  ----------------------------------------------------------------------------------------------------------------------  Detailed radiology reports for the last 24 hours:    Imaging Results (Last 24 Hours)     ** No results found for the last 24 hours. **        Final impressions for the last 30 days of radiology reports:    FL Video Swallow Single Contrast    Result Date: 9/3/2021      Please see the speech pathologist's report for additional  information.  This report was finalized on 9/3/2021 12:31 PM by Dr. Anthony Gresham MD.      FL Video Swallow Single Contrast    Result Date: 8/28/2021  1.  Penetration with aspiration of thin liquids. 2. Please see dysphasia notes for further details.  This report was finalized on 8/28/2021 11:49 AM by Dr. Obie Fleming MD.      I have personally looked at the radiology images and read the final radiology report.    Assessment & Plan    Status post left-sided MCA pontine CVA--patient evaluated and still requiring nectar thickened liquids, soft solids and chopped meats.  Patient is on Eliquis and statin therapy.  Patient requiring maximum assistance for help with bed mobility; moderate to maximum assistance for transfers; ambulated 6 feet x 3 with parallel bars.  Continue therapeutic exercise    Constipation continue MiraLAX, Senokot at this time    Hypertension continue Norvasc, Coreg and chlorthalidone    Hypothyroidism Synthroid    Depression continue Zoloft    VTE Prophylaxis:   Mechanical Order History:      Ordered        08/27/21 1125  Maintain Sequential Compression Device  Continuous         08/27/21 1125  Place Sequential Compression Device  Once                 Pharmalogical Order History:      Ordered     Dose Route Frequency Stop    08/27/21 1125  apixaban (ELIQUIS) tablet 5 mg      5 mg PO Every 12 Hours Scheduled --                    Garrett Sneed MD  [facility]  09/07/21  10:55 EDT

## 2021-09-07 NOTE — NURSING NOTE
PATIENT SAFELY TRANSFERRED TO ROOM 110 BED A D/T LEAK IN CEILING TILES.  NOTIFIED SON, ELENA BRODY, VIA TELEPHONE CALL AT [phone].  MR BRODY VERBALIZED UNDERSTANDING AND WAS THANKFUL WE NOTIFIED HIM OF HIS MOTHER'S MOVE TO A DIFFERENT ROOM.

## 2021-09-07 NOTE — NURSING NOTE
Team conference was held this morning and discharge date is planned for 9/24/21.  Pt plans to return home with family providing assistance.

## 2021-09-07 NOTE — THERAPY TREATMENT NOTE
Inpatient Rehabilitation - Physical Therapy Treatment Note        Tre     Patient Name: Joycelyn Brown  : 1940  MRN: [id number]    Today's Date: 2021                    Admit Date: 2021      Visit Dx:     ICD-10-CM ICD-9-CM   1. CVA (cerebral vascular accident) (CMS/HCC)  I63.9 434.91       Patient Active Problem List   Diagnosis   • CVA (cerebral vascular accident) (CMS/Formerly [facility])       History reviewed. No pertinent past medical history.    No past surgical history on file.       PT ASSESSMENT (last 12 hours)      IRF PT Evaluation and Treatment     Row Name 21 1558          PT Time and Intention    Document Type  daily treatment  -RF     Mode of Treatment  physical therapy  -RF     Patient/Family/Caregiver Comments/Observations  No significant c/o noted with treatment this date.   -     Row Name 21 1558          General Information    Patient Profile Reviewed  yes  -     Row Name 21 1558          Cognition/Psychosocial    Affect/Mental Status (Cognitive)  WFL  -RF     Orientation Status (Cognition)  oriented to;place;time  -RF     Follows Commands (Cognition)  WFL;follows one-step commands;over 90% accuracy  -     Row Name 21 1558          Bed Mobility    Bed Mobility  supine-sit;rolling left;rolling right;sit-supine  -RF     Supine-Sit [place] (Bed Mobility)  maximum assist (25% patient effort)  -RF     Sit-Supine [place] (Bed Mobility)  maximum assist (25% patient effort)  -RF     Assistive Device (Bed Mobility)  bed rails  -     Row Name 21 1558          Transfer Assessment/Treatment    Transfers  sit-stand transfer;stand-sit transfer;stand pivot/stand step transfer;bed-chair transfer  -     Row Name 21 1558          Transfers    Bed-Chair [place] (Transfers)  2 person assist;moderate assist (50% patient effort);maximum assist (25% patient effort)  -RF     Chair-Bed [place] (Transfers)  2 person assist;moderate assist (50% patient  effort);maximum assist (25% patient effort)  -RF     Assistive Device (Bed-Chair Transfers)  wheelchair  -RF     Sit-Stand [place] (Transfers)  2 person assist;moderate assist (50% patient effort)  -RF     Stand-Sit [place] (Transfers)  2 person assist;other (see comments);moderate assist (50% patient effort) mod in parallel bars  -RF     Row Name 09/07/21 1558          Chair-Bed Transfer    Assistive Device (Chair-Bed Transfers)  wheelchair  -RF     Row Name 09/07/21 1558          Sit-Stand Transfer    Assistive Device (Sit-Stand Transfers)  wheelchair;other (see comments) Mod in parallel bars  -RF     Row Name 09/07/21 1558          Stand-Sit Transfer    Assistive Device (Stand-Sit Transfers)  wheelchair;other (see comments) parallel bars  -RF     Row Name 09/07/21 1558          Gait/Stairs (Locomotion)    [place] Level (Gait)  maximum assist (25% patient effort);2 person assist  -RF     Assistive Device (Gait)  other (see comments) parallel bars  -RF     Distance in Feet (Gait)  6'X4  -RF     Pattern (Gait)  step-to  -RF     Bilateral Gait Deviations  forward flexed posture;weight shift ability decreased  -RF     Right Sided Gait Deviations  knee buckling, right side;weight shift ability decreased  -RF     Comment (Gait/Stairs)  Pt continually requires increased cuing and assistance with weightshifting and R knee extension; cues also required for upright posture. Pt demonstrates shuufled step on LLE.   -     Row Name 09/07/21 1558          Balance    Balance Assessment  standing dynamic balance  -RF     Dynamic Standing Balance  severe impairment;supported;standing;asymmetrical weight shifting;other (see comments) weightshifting  -RF     Comment, Balance  Pt requires manual knee blocking for RLE; cues provided for upright posture.   -     Row Name 09/07/21 1558          Hip (Therapeutic Exercise)    Hip Strengthening (Therapeutic Exercise)  bilateral;flexion;extension;aBduction;aDduction;heel  slides;marching while seated;sitting;resistance band;yellow;2 sets;10 repetitions;other (see comments) AROM req for RLE  -RF     Row Name 09/07/21 1558          Knee (Therapeutic Exercise)    Knee Strengthening (Therapeutic Exercise)  bilateral;flexion;extension;marching while seated;LAQ (long arc quad);hamstring curls;sitting;resistance band;yellow;2 sets;10 repetitions;other (see comments) stand QS  -RF     Row Name 09/07/21 1558          Ankle (Therapeutic Exercise)    Ankle Strengthening (Therapeutic Exercise)  bilateral;dorsiflexion;plantarflexion;sitting;2 sets;10 repetitions  -RF     Row Name 09/07/21 1558          Modality Intervention (Comprehensive)    Modality Treatment  [place] stimulation  -RF     Row Name 09/07/21 1558          IRF PT Goals    Bed Mobility Goal Selection (PT-IRF)  bed mobility, PT goal 1  -RF     Transfer Goal Selection (PT-IRF)  transfers, PT goal 1  -RF     Gait (Walking Locomotion) Goal Selection (PT-IRF)  gait, PT goal 1  -RF     Stairs Goal Selection (PT-IRF)  stairs, PT goal 1  -RF     Row Name 09/07/21 1558          Bed Mobility Goal 1 (PT-IRF)    Activity/Assistive Device (Bed Mobility Goal 1, PT-IRF)  bed mobility activities, all  -RF     [place] Level (Bed Mobility Goal 1, PT-IRF)  standby assist  -RF     Time Frame (Bed Mobility Goal 1, PT-IRF)  long-term goal (LTG)  -RF     Progress/Outcomes (Bed Mobility Goal 1, PT-IRF)  goal ongoing  -RF     Row Name 09/07/21 1558          Transfer Goal 1 (PT-IRF)    Activity/Assistive Device (Transfer Goal 1, PT-IRF)  all transfers  -RF     [place] Level (Transfer Goal 1, PT-IRF)  minimum assist (75% or more patient effort)  -RF     Time Frame (Transfer Goal 1, PT-IRF)  by discharge  -RF     Progress/Outcomes (Transfer Goal 1, PT-IRF)  goal ongoing  -RF     Row Name 09/07/21 1558          Gait/Walking Locomotion Goal 1 (PT-IRF)    Activity/Assistive Device (Gait/Walking Locomotion Goal 1, PT-IRF)  gait (walking  locomotion);assistive device use  -RF     Gait/Walking Locomotion Distance Goal 1 (PT-IRF)  5  -RF     [place] Level (Gait/Walking Locomotion Goal 1, PT-IRF)  minimum assist (75% or more patient effort)  -RF     Time Frame (Gait/Walking Locomotion Goal 1, PT-IRF)  long-term goal (LTG)  -RF     Progress/Outcomes (Gait/Walking Locomotion Goal 1, PT-IRF)  goal ongoing  -RF     Row Name 09/07/21 1558          Stairs Goal 1 (PT-IRF)    Activity/Assistive Device (Stairs Goal 1, PT-IRF)  stairs, all skills;ascending stairs;descending stairs;using handrail, left  -RF     Number of Stairs (Stairs Goal 1, PT-IRF)  5  -RF     [place] Level (Stairs Goal 1, PT-IRF)  minimum assist (75% or more patient effort);moderate assist (50-74% patient effort)  -RF     Time Frame (Stairs Goal 1, PT-IRF)  long-term goal (LTG)  -RF     Progress/Outcomes (Stairs Goal 1, PT-IRF)  goal ongoing  -RF     Row Name 09/07/21 1558          Positioning and Restraints    Pre-Treatment Position  sitting in chair/recliner bed in PM  -RF     In Bed  supine;call light within reach;encouraged to call for assist BID  -RF     Row Name 09/07/21 9651          Therapy Assessment/Plan (PT)    Rehab Potential/Prognosis (PT)  adequate, monitor progress closely  -RF     Frequency of Treatment (PT)  5 times per week  -RF     Estimated Duration of Therapy (PT)  2 weeks  -RF     Problem List (PT)  problems related to;balance;hemiparesis/hemiplegia;mobility;strength  -RF     Row Name 09/07/21 155          Daily Progress Summary (PT)    Functional Goal Overall Progress (PT)  progressing toward functional goals as expected  -RF     Daily Progress Summary (PT)  Improvements noted in functional mobility this date, however increased assistance continually required. Conitnued skilled care required for further improvements in LE strengthening and neuromuscular activation to ensure maximum safe function upon D/C.    -RF     Impairments Still Limiting Function (PT)   ROM decreased;sensation decreased;strength decreased;impaired balance;impaired communication;impaired functional activity tolerance;motor control impaired;postural control impaired;sensory feedback impaired;pain  -RF     Recommendations (PT)  Conitnue per current POC.   -RF       User Key  (r) = Recorded By, (t) = Taken By, (c) = Cosigned By    Initials Name Provider Type    Malinda Wang PTA Physical Therapy Assistant           Physical Therapy Education                 Title: PT OT SLP Therapies (Done)     Topic: Physical Therapy (Done)     Point: Mobility training (Done)     Learning Progress Summary           Patient Acceptance, E,TB, VU by RF at 9/7/2021 1605    Acceptance, E,TB, VU by RF at 9/6/2021 1557    Acceptance, E,TB, VU by RF at 9/3/2021 1532    Acceptance, E,TB, VU by DG at 9/2/2021 2003    Acceptance, E,TB, VU by RF at 9/2/2021 1610    Acceptance, E,TB, VU by DG at 9/1/2021 2256    Acceptance, E,TB, VU by RF at 9/1/2021 1548    Acceptance, E,TB, VU by RF at 8/31/2021 1423    Acceptance, E,TB, VU by RF at 8/30/2021 1548                   Point: Home exercise program (Done)     Learning Progress Summary           Patient Acceptance, E,TB, VU by RF at 9/7/2021 1605    Acceptance, E,TB, VU by RF at 9/6/2021 1557    Acceptance, E,TB, VU by RF at 9/3/2021 1532    Acceptance, E,TB, VU by DG at 9/2/2021 2003    Acceptance, E,TB, VU by RF at 9/2/2021 1610    Acceptance, E,TB, VU by DG at 9/1/2021 2256    Acceptance, E,TB, VU by RF at 9/1/2021 1548    Acceptance, E,TB, VU by RF at 8/31/2021 1423    Acceptance, E,TB, VU by RF at 8/30/2021 1548                   Point: Body mechanics (Done)     Learning Progress Summary           Patient Acceptance, E,TB, VU by RF at 9/7/2021 1605    Acceptance, E,TB, VU by RF at 9/6/2021 1557    Acceptance, E,TB, VU by RF at 9/3/2021 1532    Acceptance, E,TB, VU by DG at 9/2/2021 2003    Acceptance, E,TB, VU by RF at 9/2/2021 1610    Acceptance, E,TB, VU by DG at  9/1/2021 2256    Acceptance, E,TB, VU by RF at 9/1/2021 1548    Acceptance, E,TB, VU by RF at 8/31/2021 1423    Acceptance, E,TB, VU by RF at 8/30/2021 1548                   Point: Precautions (Done)     Learning Progress Summary           Patient Acceptance, E,TB, VU by RF at 9/7/2021 1605    Acceptance, E,TB, VU by RF at 9/6/2021 1557    Acceptance, E,TB, VU by RF at 9/3/2021 1532    Acceptance, E,TB, VU by DG at 9/2/2021 2003    Acceptance, E,TB, VU by RF at 9/2/2021 1610    Acceptance, E,TB, VU by DG at 9/1/2021 2256    Acceptance, E,TB, VU by RF at 9/1/2021 1548    Acceptance, E,TB, VU by RF at 8/31/2021 1423    Acceptance, E,TB, VU by RF at 8/30/2021 1548                               User Key     Initials Effective Dates Name Provider Type Discipline     06/16/21 -  Melissa Swift, RN Registered Nurse Nurse     06/16/21 -  Malinda Alicea PTA Physical Therapy Assistant PT                PT Recommendation and Plan    Frequency of Treatment (PT): 5 times per week     Daily Progress Summary (PT)  Functional Goal Overall Progress (PT): progressing toward functional goals as expected  Daily Progress Summary (PT): Improvements noted in functional mobility this date, however increased assistance continually required. Conitnued skilled care required for further improvements in LE strengthening and neuromuscular activation to ensure maximum safe function upon D/C.    Impairments Still Limiting Function (PT): ROM decreased, sensation decreased, strength decreased, impaired balance, impaired communication, impaired functional activity tolerance, motor control impaired, postural control impaired, sensory feedback impaired, pain  Recommendations (PT): Conitnue per current POC.                Time Calculation:     PT Charges     Row Name 09/07/21 1608 09/07/21 1605          Time Calculation    Start Time  1245  -RF  1045  -RF     Stop Time  1330  -RF  1145  -RF     Time Calculation (min)  45 min  -RF  60 min  -RF      PT Received On  09/07/21  -RF  09/07/21  -RF     PT - Next Appointment  09/08/21  -RF  09/07/21  -RF     PT Goal Re-Cert Due Date  09/10/21  -RF  09/10/21  -RF        Time Calculation- PT    Total Timed Code Minutes- PT  45 minute(s)  -RF  60 minute(s)  -RF       User Key  (r) = Recorded By, (t) = Taken By, (c) = Cosigned By    Initials Name Provider Type    RF Malinda Alicea, CROW Physical Therapy Assistant          Therapy Charges for Today     Code Description Service Date Service Provider Modifiers Qty    70800669144 HC GAIT TRAINING EA 15 MIN 9/6/2021 Malinda Alicea, PTA GP, KX 1    34377749410 HC PT NEUROMUSC RE EDUCATION EA 15 MIN 9/6/2021 Malinda Alicea, PTA GP, KX 2    88390317847 HC PT THER PROC EA 15 MIN 9/6/2021 Malinda Alicea, PTA GP, KX 1    25677925137 HC PT THERAPEUTIC ACT EA 15 MIN 9/6/2021 Malinda Alicea, PTA GP, KX 2    51613199077 HC GAIT TRAINING EA 15 MIN 9/7/2021 Malinda Alicea, PTA GP, KX 2    92129621122 HC PT NEUROMUSC RE EDUCATION EA 15 MIN 9/7/2021 Malinda Alicea, PTA GP, KX 2    08558094542 HC PT THER PROC EA 15 MIN 9/7/2021 Malinda Alicea, PTA GP, KX 2    64108363912 HC PT THERAPEUTIC ACT EA 15 MIN 9/7/2021 Malinda Alicea, PTA GP, KX 1                   Malinda Alicea, PTA  9/7/2021

## 2021-09-07 NOTE — THERAPY TREATMENT NOTE
Inpatient Rehabilitation - Speech Language Pathology   Swallow Treatment Note [facility] [place]     Patient Name: Joycelyn Brown  : 1940  MRN: [id number]  Today's Date: 2021               Admit Date: 2021    Visit Dx:     ICD-10-CM ICD-9-CM   1. CVA (cerebral vascular accident) (CMS/Formerly [facility])  I63.9 434.91     Patient Active Problem List   Diagnosis   • CVA (cerebral vascular accident) (CMS/Formerly [facility])     History reviewed. No pertinent past medical history.  No past surgical history on file.    Dysphagia Tx Note:      Ms. Brown is seen in pt room 110A of inpatient physical rehabilitation this am and pm for formal dysphagia tx.  She is cooperative to participate on this date.   She is noted w/ improved endurance on this date.  She is s/p MBS on 9/3/21 w/ recommendations to continue nectar thick liquids.       Long Term Goal:  Pt will accept least restrictive diet tolerance w/o overt s/s aspiration.      Short Term Goals:  1. Pt will tolerate facial massage to R  Facial surface for 3-7 minutes to increase surface blood flow, sensation and ROM over 3 consecutive sessions.   *AM: R mechanical facial massage for 7 Minutes w/ mildly increased surface blood flow.  *PM: R mechanical facial massage for 7 minutes w/ mildly increased surface blood flow.     2. Pt will perform OROM/MONAE exercises x3 sets x10 reps w/ min cues.  *AM: x2 sets x10 reps w/ mod cues.   *PM: x3 sets x10 reps w/ mod cues    3. Pt will perform resistive breathing exercises x3 sets x5 reps w/resistance of 2-5  To improve respiratory support and control.   *AM: x3 sets x10 reps w/ inhale/exhale level 3/3 w/ min cues   *PM: x4 sets x10 reps w/ inhale/exhale level 3/3 w/ min cues    4. Pt will perform laryngeal adduction/elevation exercises x3 sets x10 reps w/ min cues.   *AM: Avg duration of 7.85 sec pre-breather x1 set x5 reps.  *AM: Avg duration of 8.28 sec post-breather x2 set x5 reps.  *PM: Avg duration of 7.60 sec pre-breather x1 set x5 reps.  *PM: Avg  duration of 10.09 sec post-breather x3 sets x5 reps.      5. Pt will perform Shaker exercises sustained x3 sets x5 reps for 30+ seconds over 3 consecutive sessions.   *Not addressed on this date.       6. Pt will perform Shaker exercises repetitive x3 sets x12 reps w/ mod cues.   *AM: x4 sets x5 reps w/ min cues.  Fatigue effects noted.   *PM: x4 sets x5 reps w/ min cues.   *PM: x2 sets x10 reps w/ min cues.     7. Pt will perform compensatory techniques during meals w/ min cues.  *AM & PM: Pt reports decreased instances of requiring finger sweep for food remnants in R buccal area.         8. Pt will participate in instrumental re-evaluation of swallowing fnx in 7-10 days, pending progress towards this poc.  *pending MBS.    Thank you-  Maryam Jones M.S., [facility]-SLP      -------------------------------------------------------------------------------------------------------      DYSARTHRIA THERAPY PLAN OF CARE:     Ms. rBown is seen at bedside of inpatient physical rehabilitation this am for formal dysphagia tx.  She is cooperative to participate on this date.   She is noted w/ moderate fatigue and requires rest periods across therapy tasks.      Long Term Goal:  Pt will improve motor speech to allow for maximal communication and safety in adls.      Short Term Goals:  1. Pt will tolerate facial massage to R dacial surface for 3-7 minutes to increase surface blood flow, sensation and ROM over 3 consecutive sessions.   *AM: R mechanical facial massage for 7 Minutes w/ mildly increased surface blood flow.  *PM: R mechanical facial massage for 7 minutes w/ mildly increased surface blood flow.     2. Pt will perform OROM/MONAE exercises x3 sets x10 reps w/ min cues.  *x3 sets x10 reps w/ mod cues.   *AM: x2 sets x 10 reps w/ min cues.  *PM: x3 sets x10 reps w/ mod cues    3. Pt will perform resistive breathing exercises x3 sets x5 reps w/resistance of 2-5 to improve respiratory support and control.   *AM: x3 sets x10 reps w/  inhale/exhale level 3/3 w/ mod cues  *PM: x4 sets x10 reps w/ inhale/exhale level 3/3 w/ min cues    4. Pt will accurately produce multi-syllabic words 4/5 opp w/ min cues over 3 consecutive sessions.   *AM & PM: Not directly addressed     5. Pt will overarticulate and hyperphonate in connected speech 4/5 opp w/ min cues over 3 consecutive sessions.   *AM & PM: 3/5 opp w/ min cues in general conversation     6. Pt will improve intelligibility to 100% for both known/unknown context in conversation w/ min cues over 3 consecutive sessions.   *PM: approx 97% intelligible w/ unknown context and min cues for decreased rate and overarticulation.      *Goals to be added/modified as necessary.      Thank you-  Maryam Jones M.S., CCC-SLP        SLP Recommendation and Plan  Continue modified po diet and tx per poc.     EDUCATION  The patient has been educated in the following areas:   Dysphagia (Swallowing Impairment) Modified Diet Instruction.     Time Calculation:   Time Calculation- SLP     Row Name 09/07/21 0920             Time Calculation- SLP    SLP Start Time  0835  -      SLP Stop Time  0915  -      SLP Time Calculation (min)  40 min  -      SLP - Next Appointment  09/08/21  -        User Key  (r) = Recorded By, (t) = Taken By, (c) = Cosigned By    Initials Name Provider Type    Maryam Adams MS, CFY-SLP Speech and Language Pathologist        Therapy Charges for Today     Code Description Service Date Service Provider Modifiers Qty    06089419843 HC ST TREATMENT SWALLOW 2 9/6/2021 Maryam Jones MS, CFY-SLP GN, KX 1    03789642391 HC ST TREATMENT SPEECH 1 9/6/2021 Maryam Jones MS, CFY-SLP GN, KX 1    [id number] HC ST TREATMENT SPEECH 1 9/6/2021 Maryam Jones MS, CFY-SLP GN, KX 1    [id number] HC ST TREATMENT SWALLOW 2 9/6/2021 Maryam Jones MS, CFY-SLP GN, KX 1    [id number] HC ST TREATMENT SWALLOW 2 9/7/2021 Maryam Jones MS, CFY-SLP GN, KX 1    23510047987 HC ST TREATMENT SPEECH 1 9/7/2021 Maryam Jones MS,  CFY-SLP GN, KX 1        Patient was not wearing a face mask during this therapy encounter. Therapist used appropriate personal protective equipment including mask, eye protection and gloves.  Mask used was standard procedure mask. Appropriate PPE was worn during the entire therapy session. The patient did not cough during this evaluation. Therapist was within 6 feet for 15 minutes or more during the evaluation. Hand hygiene was completed before and after therapy session. Patient is not in enhanced droplet precautions.     Maryam Jones MS, CFY-SLP  9/7/2021

## 2021-09-07 NOTE — PROGRESS NOTES
Occupational Therapy:    Physical Therapy:    Speech Language Pathology: Individual: 80 minutes.    Signed by: MEET Chen

## 2021-09-07 NOTE — THERAPY TREATMENT NOTE
Inpatient Rehabilitation - Occupational Therapy Treatment Note     Tre     Patient Name: Joycelyn Brown  : 1940  MRN: [id number]    Today's Date: 2021                 Admit Date: 2021         ICD-10-CM ICD-9-CM   1. CVA (cerebral vascular accident) (CMS/[facility])  I63.9 434.91       Patient Active Problem List   Diagnosis   • CVA (cerebral vascular accident) (CMS/[facility])       History reviewed. No pertinent past medical history.    No past surgical history on file.             IRF OT ASSESSMENT FLOWSHEET (last 12 hours)      IRF OT Evaluation and Treatment     Row Name 21 1400          OT Time and Intention    Document Type  daily treatment  -     Mode of Treatment  individual therapy;occupational therapy  -     Patient Effort  good  -     Row Name 21 1400          General Information    Patient/Family/Caregiver Comments/Observations  patient agreeable to therapy this am  -     Existing Precautions/Restrictions  fall;oxygen therapy device and L/min  -     Row Name 21 1400          Transfers    Chair-Bed [place] (Transfers)  maximum assist (25% patient effort);2 person assist  -     Row Name 21 1400          Motor Skills    Motor Skills  coordination;functional endurance;neuro-muscular function;therapeutic exercise  -     Therapeutic Exercise  -- RUE ROM, AAROM, PROM, functional endurance, wt bearing  -     Row Name 21 1400          Grooming    [place] Level (Grooming)  maximum assist (25% patient effort);moderate assist (50% patient effort);hair care, combing/brushing  -       User Key  (r) = Recorded By, (t) = Taken By, (c) = Cosigned By    Initials Name Effective Dates     Maria Guadalupe Monge, OT 21 -            Occupational Therapy Education                 Title: PT OT SLP Therapies (Done)     Topic: Occupational Therapy (Done)     Point: ADL training (Done)     Description:   Instruct learner(s) on proper safety adaptation and  remediation techniques during self care or transfers.   Instruct in proper use of assistive devices.              Learning Progress Summary           Patient Acceptance, E,TB, VU by DG at 9/2/2021 2003    Acceptance, E,D, VU,NR by  at 9/2/2021 1458    Acceptance, E,TB, VU by DG at 9/1/2021 2256    Acceptance, E,D, VU,NR by  at 9/1/2021 1520    Acceptance, E,D, VU,NR by  at 8/31/2021 1541    Acceptance, E, VU,NR by  at 8/30/2021 1507    Acceptance, E,TB, VU by DG at 8/28/2021 2040    Acceptance, E,D, VU,NR by AB at 8/28/2021 1410                   Point: Home exercise program (Done)     Description:   Instruct learner(s) on appropriate technique for monitoring, assisting and/or progressing therapeutic exercises/activities.              Learning Progress Summary           Patient Acceptance, E,TB, VU by DG at 9/2/2021 2003    Acceptance, E,TB, VU by DG at 9/1/2021 2256    Acceptance, E,TB, VU by DG at 8/28/2021 2040                   Point: Precautions (Done)     Description:   Instruct learner(s) on prescribed precautions during self-care and functional transfers.              Learning Progress Summary           Patient Acceptance, E,TB, VU by  at 9/2/2021 2003    Acceptance, E,D, VU,NR by  at 9/2/2021 1458    Acceptance, E,TB, VU by DG at 9/1/2021 2256    Acceptance, E,D, VU,NR by  at 9/1/2021 1520    Acceptance, E,D, VU,NR by  at 8/31/2021 1541    Acceptance, E, VU,NR by  at 8/30/2021 1507    Acceptance, E,TB, VU by  at 8/28/2021 2040    Acceptance, E,D, VU,NR by AB at 8/28/2021 1410                               User Key     Initials Effective Dates Name Provider Type Discipline    AB 06/16/21 -  Yazmin Chaves, OT Occupational Therapist OT     06/16/21 -  Melissa Swift RN Registered Nurse Nurse     06/16/21 -  Debbie Mehta, OT Occupational Therapist OT     06/16/21 -  Lucrecia Andrews, OT Occupational Therapist OT                    OT Recommendation and Plan                          Time Calculation:     Time Calculation- OT     Row Name 09/07/21 1406             Time Calculation-     OT Start Time  0915  -      OT Stop Time  1045  -      OT Time Calculation (min)  90 min  -        User Key  (r) = Recorded By, (t) = Taken By, (c) = Cosigned By    Initials Name Provider Type     Maria Guadalupe Monge, OT Occupational Therapist        Therapy Charges for Today     Code Description Service Date Service Provider Modifiers Qty    97570709387 HC OT SELF CARE/MGMT/TRAIN EA 15 MIN 9/7/2021 Maria Guadalupe Monge, OT GO, KX 1    [id number]  OT THERAPEUTIC ACT EA 15 MIN 9/7/2021 Maria Guadalupe Monge, OT GO, KX 2    [id number]  OT NEUROMUSC RE EDUCATION EA 15 MIN 9/7/2021 Maria Guadalupe Monge OT GO, KX 3                   Maria Guadalupe Monge OT  9/7/2021

## 2021-09-07 NOTE — SIGNIFICANT NOTE
09/07/21 0271   Plan   Plan Team conference held today.  Attempted to contact daughter Lissette [phone] without success/cannot receive messages at this time.  Spoke to daughter Missy [phone] about how pt is doing in therapy and plans for discharge on 9-24-21.  Discussed other options for continued rehab after rehab including SNF, home health, or outpatient therapy.  Daughter mentioned swing bed program at [facility] if pt is not ready to come home and needs continued rehab.  Family want to take pt home at discharge if possible.  Daughter will inform her siblings about discharge date and how pt is doing in therapy.  Spoke to pt about how she is doing in therapy and plans for discharge on 9-24-21 based on how she is doing in therapy and family's ability to meet caregiving needs.  Reviewed options for continued rehab.  Pt voiced feeling depressed and dealing with depression prior to CVA.  Pt is on Zoloft at this time.  Pt voiced plans to talk to MD about depression and medication.  Encouraged pt to give her best effort in therapy each day.  Informed MD about pt wanting to talk to him about depression and medications in am.  Will follow.   Patient/Family in Agreement with Plan yes

## 2021-09-07 NOTE — PROGRESS NOTES
Occupational Therapy:    Physical Therapy: Individual: 105 minutes.    Speech Language Pathology:    Signed by: Malinda Alicea PTA

## 2021-09-08 LAB
GLUCOSE BLDC GLUCOMTR-MCNC: 195 MG/DL (ref 70–130)
GLUCOSE BLDC GLUCOMTR-MCNC: 265 MG/DL (ref 70–130)
GLUCOSE BLDC GLUCOMTR-MCNC: 320 MG/DL (ref 70–130)
GLUCOSE BLDC GLUCOMTR-MCNC: 366 MG/DL (ref 70–130)

## 2021-09-08 PROCEDURE — 92507 TX SP LANG VOICE COMM INDIV: CPT

## 2021-09-08 PROCEDURE — 92526 ORAL FUNCTION THERAPY: CPT

## 2021-09-08 PROCEDURE — 97530 THERAPEUTIC ACTIVITIES: CPT | Performed by: OCCUPATIONAL THERAPIST

## 2021-09-08 PROCEDURE — 97116 GAIT TRAINING THERAPY: CPT

## 2021-09-08 PROCEDURE — 97530 THERAPEUTIC ACTIVITIES: CPT

## 2021-09-08 PROCEDURE — 63710000001 INSULIN DETEMIR PER 5 UNITS: Performed by: INTERNAL MEDICINE

## 2021-09-08 PROCEDURE — 82962 GLUCOSE BLOOD TEST: CPT

## 2021-09-08 PROCEDURE — 97112 NEUROMUSCULAR REEDUCATION: CPT | Performed by: OCCUPATIONAL THERAPIST

## 2021-09-08 PROCEDURE — 97110 THERAPEUTIC EXERCISES: CPT

## 2021-09-08 PROCEDURE — 97535 SELF CARE MNGMENT TRAINING: CPT | Performed by: OCCUPATIONAL THERAPIST

## 2021-09-08 PROCEDURE — 97112 NEUROMUSCULAR REEDUCATION: CPT

## 2021-09-08 PROCEDURE — 63710000001 INSULIN ASPART PER 5 UNITS: Performed by: INTERNAL MEDICINE

## 2021-09-08 PROCEDURE — 99232 SBSQ HOSP IP/OBS MODERATE 35: CPT | Performed by: FAMILY MEDICINE

## 2021-09-08 RX ADMIN — AMLODIPINE BESYLATE 10 MG: 10 TABLET ORAL at 08:32

## 2021-09-08 RX ADMIN — SERTRALINE 25 MG: 25 TABLET, FILM COATED ORAL at 08:33

## 2021-09-08 RX ADMIN — DOCUSATE SODIUM 50 MG AND SENNOSIDES 8.6 MG 1 TABLET: 8.6; 5 TABLET, FILM COATED ORAL at 08:33

## 2021-09-08 RX ADMIN — APIXABAN 5 MG: 5 TABLET, FILM COATED ORAL at 21:50

## 2021-09-08 RX ADMIN — INSULIN ASPART 2 UNITS: 100 INJECTION, SOLUTION INTRAVENOUS; SUBCUTANEOUS at 08:33

## 2021-09-08 RX ADMIN — PANTOPRAZOLE SODIUM 40 MG: 40 TABLET, DELAYED RELEASE ORAL at 05:09

## 2021-09-08 RX ADMIN — ATORVASTATIN CALCIUM 80 MG: 40 TABLET, FILM COATED ORAL at 21:50

## 2021-09-08 RX ADMIN — APIXABAN 5 MG: 5 TABLET, FILM COATED ORAL at 08:32

## 2021-09-08 RX ADMIN — INSULIN DETEMIR 20 UNITS: 100 INJECTION, SOLUTION SUBCUTANEOUS at 08:36

## 2021-09-08 RX ADMIN — KETOTIFEN FUMARATE 1 DROP: 0.35 SOLUTION/ DROPS OPHTHALMIC at 21:50

## 2021-09-08 RX ADMIN — Medication 1000 MCG: at 08:33

## 2021-09-08 RX ADMIN — INSULIN ASPART 6 UNITS: 100 INJECTION, SOLUTION INTRAVENOUS; SUBCUTANEOUS at 18:53

## 2021-09-08 RX ADMIN — Medication 1 TABLET: at 08:33

## 2021-09-08 RX ADMIN — LINAGLIPTIN 5 MG: 5 TABLET, FILM COATED ORAL at 08:32

## 2021-09-08 RX ADMIN — LEVOTHYROXINE SODIUM 50 MCG: 50 TABLET ORAL at 05:09

## 2021-09-08 RX ADMIN — DOCUSATE SODIUM 50 MG AND SENNOSIDES 8.6 MG 1 TABLET: 8.6; 5 TABLET, FILM COATED ORAL at 21:50

## 2021-09-08 RX ADMIN — INSULIN DETEMIR 20 UNITS: 100 INJECTION, SOLUTION SUBCUTANEOUS at 21:49

## 2021-09-08 RX ADMIN — CHLORTHALIDONE 25 MG: 50 TABLET ORAL at 08:32

## 2021-09-08 RX ADMIN — CARVEDILOL 12.5 MG: 6.25 TABLET, FILM COATED ORAL at 17:39

## 2021-09-08 RX ADMIN — INSULIN ASPART 4 UNITS: 100 INJECTION, SOLUTION INTRAVENOUS; SUBCUTANEOUS at 12:29

## 2021-09-08 RX ADMIN — KETOTIFEN FUMARATE 1 DROP: 0.35 SOLUTION/ DROPS OPHTHALMIC at 17:39

## 2021-09-08 RX ADMIN — KETOTIFEN FUMARATE 1 DROP: 0.35 SOLUTION/ DROPS OPHTHALMIC at 08:32

## 2021-09-08 RX ADMIN — Medication 10 MG: at 21:50

## 2021-09-08 RX ADMIN — CARVEDILOL 12.5 MG: 6.25 TABLET, FILM COATED ORAL at 08:32

## 2021-09-08 RX ADMIN — OXYCODONE HYDROCHLORIDE AND ACETAMINOPHEN 500 MG: 500 TABLET ORAL at 08:32

## 2021-09-08 NOTE — PROGRESS NOTES
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  I10 Essential (primary) hypertension  E78.5 Hyperlipidemia, unspecified  E03.9 Hypothyroidism, unspecified  K21.9 Gastro-esophageal reflux disease without esophagitis  K59.00 Constipation, unspecified  F32.9 Major depressive disorder, single episode, unspecified  Z87.440 Personal history of urinary (tract) infections  Z87.01 Personal history of pneumonia (recurrent)      Plan of Care  Anticipated Discharge Date/Estimated Length of Stay: 9-24-21  Anticipated Discharge Destination: Community discharge with assistance  Discharge Plan : Pt plans to return home with family providing assistance if  needed.  Medical Necessity Expected Level Rationale: fair  Intensity and Duration: an average of 3 hours/5 days per week  Medical Supervision and 24 Hour Rehab Nursing: x  Physical Therapy: x  PT Intensity/Duration: PT 1-1.5 hours per day/5 days per week  Occupational Therapy: x  OT Intensity/Duration: OT 1-1.5 hours per day/5 days per week  Speech and Language Therapy: x  SLP Intensity/Duration: SLP 30 mins-90 mins per day/5 days per week  Orthotics/Prosthetics: x  Social Work: x  Therapeutic Recreation: x  Updated (if changes indicated)    Anticipated Discharge Date/Estimated Length of Stay:   9-24-21      Discharge Plan of Care:    Based on the patient's medical and functional status, their prognosis and  expected  level of functional improvement is: fair      Interdisciplinary Problem/Goals/Status  Communication    [ST] Dysarthria(Active)  Current Status(09/07/2021): Pt presents w/ a mild dysarthria negatively  impacting communication in adls.  Weekly Goal(09/13/2021): R facial massage  Discharge Goal: Pt will improve motor speech to allow for maximal commmuncation  in adls.        Mobility    [PT] Bed Mobility(Active)  Current Status(08/28/2021): ModA  Weekly Goal(09/04/2021): ModA/Alyssa  Discharge Goal: Alyssa    [PT] Transfers(Active)  Current Status(08/28/2021): Max/Dep  Weekly Goal(09/04/2021): ModA/MaxA  Discharge Goal: ModA    [PT] Walk(Active)  Current Status(08/28/2021): N/A  Weekly Goal(09/04/2021): 2 Steps  Discharge Goal: 5 feet ModA    [PT] Stairs(Active)  Current Status(08/28/2021): N/A  Weekly Goal(09/04/2021): 1 step  Discharge Goal: 5 steps min/modA        Pain    [RN] Pain Management(Active)  Current Status(08/27/2021): potential for pain  Weekly Goal(09/24/2021): No pain this week  Discharge Goal: No pain        Safety    [RN] Potential for Injury(Active)  Current Status(08/27/2021): At risk for injury  Weekly Goal(09/24/2021): No injury this week  Discharge Goal: No injuries        Self Care    [OT] Dressing (Upper)(Active)  Current Status(08/30/2021): TotalA  Weekly Goal(09/07/2021): TotalA/MaxA  Discharge Goal: ModA        Swallow Function    [ST] Swallowing(Active)  Current Status(09/07/2021): Nectar thick liquids  Weekly Goal(09/13/2021): Tolerate facial massage to R facial surface  Discharge Goal: Least restrictive po diet    Comments: Pt plans to return home with family providing assistance if possible  at discharge.    Signed by: LATONYA Hector    Physician CoSigned By: Garrett Sneed 09/08/2021 10:35:41

## 2021-09-08 NOTE — PROGRESS NOTES
[facility]  PROGRESS NOTE     Patient Identification:  Name:  Joycelyn Brown  Age:  81 y.o.  Sex:  female  :  1940  MRN:  [id number]  Visit Number:  [id number]  ROOM: 110/1S     Primary Care Provider:  Donya Looney APRN    Length of stay in inpatient status:  12    Subjective     Chief Compliant:  No chief complaint on file.      History of Presenting Illness: 81-year-old female with a history of left MCA pontine CVA.  Patient has a fairly dense right-sided weakness having and having some difficulty swallowing as well as dysarthria.  Patient overall doing better but has been depressed.  Patient is currently on Zoloft.  She states she has had long-term issues with her depression.    Objective     Current Hospital Meds:amLODIPine, 10 mg, Oral, BID  apixaban, 5 mg, Oral, Q12H  ascorbic acid, 500 mg, Oral, Daily  atorvastatin, 80 mg, Oral, Nightly  carvedilol, 12.5 mg, Oral, BID With Meals  chlorthalidone, 25 mg, Oral, Daily  insulin aspart, 0-7 Units, Subcutaneous, TID AC  insulin detemir, 20 Units, Subcutaneous, Q12H  ketotifen, 1 drop, Both Eyes, TID  levothyroxine, 50 mcg, Oral, Q AM  linagliptin, 5 mg, Oral, Daily  melatonin, 10 mg, Oral, Nightly  multivitamin with minerals, 1 tablet, Oral, Daily  pantoprazole, 40 mg, Oral, Q AM  senna-docusate sodium, 1 tablet, Oral, BID  [START ON 2021] sertraline, 50 mg, Oral, Daily  vitamin B-12, 1,000 mcg, Oral, Daily       ----------------------------------------------------------------------------------------------------------------------  Vital Signs:  Temp:  [97.6 °F (36.4 °C)] 97.6 °F (36.4 °C)  Heart Rate:  [64-68] 68  Resp:  [18] 18  BP: (133-150)/(62) 150/62  SpO2:  [95 %] 95 %  on  Flow (L/min):  [2] 2;   Device (Oxygen Therapy): nasal cannula  Body mass index is 28.19 kg/m².    Wt Readings from Last 3 Encounters:   21 81.6 kg (180 lb)   02/10/15 81.7 kg (180 lb 0.1 oz)     Intake & Output (last 3 days)        0701 -  09/06 0700 09/06 0701 - 09/07 0700 09/07 0701 - 09/08 0700 09/08 0701 - 09/09 0700    P.O. 1080 1080 840     Total Intake(mL/kg) 1080 (13.2) 1080 (13.2) 840 (10.3)     Urine (mL/kg/hr)        Stool        Total Output        Net +1080 +1080 +840             Urine Unmeasured Occurrence 5 x 5 x 7 x     Stool Unmeasured Occurrence 3 x 1 x 1 x         Diet Soft Texture; [employer]; [place] / Syrup Thick  ----------------------------------------------------------------------------------------------------------------------  Physical exam:  Constitutional:   No acute distress  HEENT: Normocephalic atraumatic  Neck: Supple   Cardiovascular: Regular rate and rhythm  Pulmonary/Chest: Clear to auscultation  Abdominal: Positive bowel sounds soft.   Musculoskeletal: No arthropathy  Neurological: Fairly dense right-sided hemiparesis  Skin: No rash  Peripheral vascular:  Genitourinary:  ----------------------------------------------------------------------------------------------------------------------    Last echocardiogram:    ----------------------------------------------------------------------------------------------------------------------  Results from last 7 days   Lab Units 09/06/21  0111   WBC 10*3/mm3 11.69*   HEMOGLOBIN g/dL 13.1   HEMATOCRIT % 40.6   MCV fL 89.0   MCHC g/dL 32.3   PLATELETS 10*3/mm3 288         Results from last 7 days   Lab Units 09/06/21  0111 09/02/21  0204   SODIUM mmol/L 138 135*   POTASSIUM mmol/L 4.0 4.0   CHLORIDE mmol/L 99 97*   CO2 mmol/L 30.3* 28.4   BUN mg/dL 29* 28*   CREATININE mg/dL 1.13* 0.99   EGFR IF NONAFRICN AM mL/min/1.73 46* 54*   CALCIUM mg/dL 9.8 9.8   GLUCOSE mg/dL 212* 222*   ALBUMIN g/dL 3.22*  --    BILIRUBIN mg/dL 0.3  --    ALK PHOS U/L 98  --    AST (SGOT) U/L 17  --    ALT (SGPT) U/L 15  --    Estimated Creatinine Clearance: 43.6 mL/min (A) (by C-G formula based on SCr of 1.13 mg/dL (H)).  No results found for: AMMONIA              Glucose   Date/Time Value Ref Range  Status   09/08/2021 0628 195 (H) 70 - 130 mg/dL Final     Comment:     Meter: [id number] : [id number] Leah Bluelett   09/07/2021 2009 208 (H) 70 - 130 mg/dL Final     Comment:     Meter: [id number] : [id number] Miko Crystal   09/07/2021 1631 190 (H) 70 - 130 mg/dL Final     Comment:     Meter: [id number] : [id number] GREG SIMSYLA   09/07/2021 1421 373 (H) 70 - 130 mg/dL Final     Comment:     Meter: [id number] : [id number] Feliciano Laguna   09/07/2021 1122 437 (C) 70 - 130 mg/dL Final     Comment:     RN Notified Meter: [id number] : [id number] Noah Yao   09/07/2021 0617 151 (H) 70 - 130 mg/dL Final     Comment:     Meter: [id number] : [id number] URIEL BAPTISTE   09/06/2021 1901 297 (H) 70 - 130 mg/dL Final     Comment:     Meter: [id number] : [id number] URIEL BAPTISTE   09/06/2021 1624 286 (H) 70 - 130 mg/dL Final     Comment:     Meter: [id number] : [id number] Jason Donya     No results found for: TSH, FREET4  No results found for: PREGTESTUR, PREGSERUM, HCG, HCGQUANT  Pain Management Panel    There is no flowsheet data to display.       Brief Urine Lab Results     None        No results found for: BLOODCX      No results found for: URINECX  No results found for: WOUNDCX  No results found for: STOOLCX        I have personally looked at the labs and they are summarized above.  ----------------------------------------------------------------------------------------------------------------------  Detailed radiology reports for the last 24 hours:    Imaging Results (Last 24 Hours)     ** No results found for the last 24 hours. **        Final impressions for the last 30 days of radiology reports:    FL Video Swallow Single Contrast    Result Date: 9/3/2021      Please see the speech pathologist's report for additional information.  This report was finalized on 9/3/2021 12:31 PM by Dr. Anthony Gresham MD.      FL Video Swallow Single Contrast    Result Date: 8/28/2021  1.   Penetration with aspiration of thin liquids. 2. Please see dysphasia notes for further details.  This report was finalized on 8/28/2021 11:49 AM by Dr. Obie Fleming MD.      I have personally looked at the radiology images and read the final radiology report.    Assessment & Plan    Status post left MCA pontine CVA--patient still recommended to have nectar thickened liquids.  Patient on Eliquis and statin therapy.  Patient requiring maximum assistance for help with bed mobility; to person moderate assistance for bed to chair and chair to bed transfers.  Patient requiring moderate assistance for sit to stand and stand to sit.  Ambulated 6 feet x 4 with maximum assistance yesterday.  Maximum assistance to moderate assistance for grooming.    Constipation--continue MiraLAX, Senokot.  Hypertension continue Norvasc, Coreg, chlorthalidone.  Reasonably well-controlled    Depression--increase Zoloft 50 mg daily    Hypothyroidism continue Synthroid      VTE Prophylaxis:   Mechanical Order History:      Ordered        08/27/21 1125  Maintain Sequential Compression Device  Continuous         08/27/21 1125  Place Sequential Compression Device  Once                 Pharmalogical Order History:      Ordered     Dose Route Frequency Stop    08/27/21 1125  apixaban (ELIQUIS) tablet 5 mg      5 mg PO Every 12 Hours Scheduled --                    Garrett Sneed MD  [facility]  09/08/21  10:02 EDT

## 2021-09-08 NOTE — THERAPY TREATMENT NOTE
Inpatient Rehabilitation - Physical Therapy Treatment Note        Tre     Patient Name: Joycelyn Brown  : 1940  MRN: [id number]    Today's Date: 2021                    Admit Date: 2021      Visit Dx:     ICD-10-CM ICD-9-CM   1. CVA (cerebral vascular accident) (CMS/HCC)  I63.9 434.91       Patient Active Problem List   Diagnosis   • CVA (cerebral vascular accident) (CMS/HCC)       History reviewed. No pertinent past medical history.    No past surgical history on file.       PT ASSESSMENT (last 12 hours)      IRF PT Evaluation and Treatment     Row Name 21 1555          PT Time and Intention    Document Type  daily treatment  -RF     Mode of Treatment  physical therapy  -RF     Patient/Family/Caregiver Comments/Observations  No significant c/o noted this date. Pt c/o fatigue and weakness with increased activity.   -RF     Row Name 21 1558          General Information    Patient Profile Reviewed  yes  -RF     Row Name 21 1551          Cognition/Psychosocial    Affect/Mental Status (Cognitive)  WFL  -RF     Orientation Status (Cognition)  oriented to;place;time  -RF     Follows Commands (Cognition)  WFL;follows one-step commands;over 90% accuracy  -RF     Row Name 21 1552          Bed Mobility    Bed Mobility  supine-sit;rolling left;rolling right;sit-supine  -RF     Supine-Sit [place] (Bed Mobility)  maximum assist (25% patient effort)  -RF     Sit-Supine [place] (Bed Mobility)  maximum assist (25% patient effort)  -RF     Assistive Device (Bed Mobility)  bed rails  -     Row Name 21 1557          Transfer Assessment/Treatment    Transfers  sit-stand transfer;stand-sit transfer;stand pivot/stand step transfer;bed-chair transfer  -RF     Comment (Transfers)  Pt performed multiple sit> bathroom with 1 UE assist at rail for cleaning and dressing. Cues for upright posture and weightshifting required.   -     Row Name 21 1551           Transfers    Bed-Chair [place] (Transfers)  2 person assist;moderate assist (50% patient effort);maximum assist (25% patient effort)  -RF     Chair-Bed [place] (Transfers)  2 person assist;moderate assist (50% patient effort);maximum assist (25% patient effort)  -RF     Assistive Device (Bed-Chair Transfers)  wheelchair  -RF     Sit-Stand [place] (Transfers)  2 person assist;moderate assist (50% patient effort)  -RF     Stand-Sit [place] (Transfers)  2 person assist;other (see comments);moderate assist (50% patient effort) mod in parallel bars  -RF     Row Name 09/08/21 1555          Chair-Bed Transfer    Assistive Device (Chair-Bed Transfers)  wheelchair  -RF     Row Name 09/08/21 1555          Sit-Stand Transfer    Assistive Device (Sit-Stand Transfers)  wheelchair;other (see comments) Mod in parallel bars  -RF     Row Name 09/08/21 1555          Stand-Sit Transfer    Assistive Device (Stand-Sit Transfers)  wheelchair;other (see comments) parallel bars  -RF     Row Name 09/08/21 1555          Gait/Stairs (Locomotion)    [place] Level (Gait)  maximum assist (25% patient effort);2 person assist  -RF     Assistive Device (Gait)  other (see comments) parallel bars  -RF     Distance in Feet (Gait)  6'X3  -RF     Pattern (Gait)  step-to  -RF     Bilateral Gait Deviations  forward flexed posture;weight shift ability decreased  -RF     Right Sided Gait Deviations  knee buckling, right side;weight shift ability decreased  -RF     Comment (Gait/Stairs)  Patient able to advance RLE this date; toe drag noted. Cuing required for w/s and quad activation on RLE.   -RF     Row Name 09/08/21 1555          Balance    Dynamic Standing Balance  moderate impairment;severe impairment;supported;standing;asymmetrical weight shifting;other (see comments) Weightshifting in // bars  -RF     Comment, Balance  Cues provided for upright posture and quad activation on RLE; manual knee blocking provided.   -     Row  Name 09/08/21 1555          Hip (Therapeutic Exercise)    Hip Strengthening (Therapeutic Exercise)  bilateral;flexion;extension;aBduction;aDduction;external rotation;internal rotation;heel slides;supine;2 sets;10 repetitions  -RF     Row Name 09/08/21 1555          Knee (Therapeutic Exercise)    Knee Strengthening (Therapeutic Exercise)  bilateral;flexion;extension;heel slides;SLR (straight leg raise);SAQ (short arc quad);supine;2 sets;10 repetitions  -RF     Row Name 09/08/21 1555          Ankle (Therapeutic Exercise)    Ankle Strengthening (Therapeutic Exercise)  bilateral;dorsiflexion;plantarflexion;supine;2 sets;10 repetitions  -RF     Row Name 09/08/21 1555          Modality Intervention (Comprehensive)    Modality Treatment  [place]anese stimulation  -RF     Row Name 09/08/21 1555          IRF PT Goals    Bed Mobility Goal Selection (PT-IRF)  bed mobility, PT goal 1  -RF     Transfer Goal Selection (PT-IRF)  transfers, PT goal 1  -RF     Gait (Walking Locomotion) Goal Selection (PT-IRF)  gait, PT goal 1  -RF     Stairs Goal Selection (PT-IRF)  stairs, PT goal 1  -RF     Row Name 09/08/21 1555          Bed Mobility Goal 1 (PT-IRF)    Activity/Assistive Device (Bed Mobility Goal 1, PT-IRF)  bed mobility activities, all  -RF     [place] Level (Bed Mobility Goal 1, PT-IRF)  standby assist  -RF     Time Frame (Bed Mobility Goal 1, PT-IRF)  long-term goal (LTG)  -RF     Progress/Outcomes (Bed Mobility Goal 1, PT-IRF)  goal ongoing  -RF     Row Name 09/08/21 1555          Transfer Goal 1 (PT-IRF)    Activity/Assistive Device (Transfer Goal 1, PT-IRF)  all transfers  -RF     [place] Level (Transfer Goal 1, PT-IRF)  minimum assist (75% or more patient effort)  -RF     Time Frame (Transfer Goal 1, PT-IRF)  by discharge  -RF     Progress/Outcomes (Transfer Goal 1, PT-IRF)  goal ongoing  -RF     Row Name 09/08/21 1555          Gait/Walking Locomotion Goal 1 (PT-IRF)    Activity/Assistive Device (Gait/Walking  Locomotion Goal 1, PT-IRF)  gait (walking locomotion);assistive device use  -RF     Gait/Walking Locomotion Distance Goal 1 (PT-IRF)  5  -RF     [place] Level (Gait/Walking Locomotion Goal 1, PT-IRF)  minimum assist (75% or more patient effort)  -RF     Time Frame (Gait/Walking Locomotion Goal 1, PT-IRF)  long-term goal (LTG)  -RF     Progress/Outcomes (Gait/Walking Locomotion Goal 1, PT-IRF)  goal ongoing  -RF     Row Name 09/08/21 8237          Stairs Goal 1 (PT-IRF)    Activity/Assistive Device (Stairs Goal 1, PT-IRF)  stairs, all skills;ascending stairs;descending stairs;using handrail, left  -RF     Number of Stairs (Stairs Goal 1, PT-IRF)  5  -RF     [place] Level (Stairs Goal 1, PT-IRF)  minimum assist (75% or more patient effort);moderate assist (50-74% patient effort)  -RF     Time Frame (Stairs Goal 1, PT-IRF)  long-term goal (LTG)  -RF     Progress/Outcomes (Stairs Goal 1, PT-IRF)  goal ongoing  -RF     Row Name 09/08/21 1042          Positioning and Restraints    Pre-Treatment Position  sitting in chair/recliner BID  -RF     In Wheelchair  sitting;with OT PM, with activities in AM  -RF     Row Name 09/08/21 3481          Therapy Assessment/Plan (PT)    Rehab Potential/Prognosis (PT)  adequate, monitor progress closely  -RF     Frequency of Treatment (PT)  5 times per week  -RF     Estimated Duration of Therapy (PT)  2 weeks  -RF     Problem List (PT)  problems related to;balance;hemiparesis/hemiplegia;mobility;strength  -RF     Row Name 09/08/21 7406          Daily Progress Summary (PT)    Functional Goal Overall Progress (PT)  progressing toward functional goals as expected  -RF     Daily Progress Summary (PT)  Patient demonstrates improvements in ambualtion quality this date as patient able to advance RLE. INcreased assistance conitnually required for functional transferring. Continued skilled care required for further improvements to ensure maximum safe function upon D/C.   -RF      Impairments Still Limiting Function (PT)  ROM decreased;sensation decreased;strength decreased;impaired balance;impaired communication;impaired functional activity tolerance;motor control impaired;postural control impaired;sensory feedback impaired;pain  -RF     Recommendations (PT)  Continue per current POC.   -RF       User Key  (r) = Recorded By, (t) = Taken By, (c) = Cosigned By    Initials Name Provider Type    Malinda Wang PTA Physical Therapy Assistant           Physical Therapy Education                 Title: PT OT SLP Therapies (Done)     Topic: Physical Therapy (Done)     Point: Mobility training (Done)     Learning Progress Summary           Patient Acceptance, E,TB, VU by RF at 9/8/2021 1602    Acceptance, E,TB, VU by RF at 9/7/2021 1605    Acceptance, E,TB, VU by RF at 9/6/2021 1557    Acceptance, E,TB, VU by RF at 9/3/2021 1532    Acceptance, E,TB, VU by DG at 9/2/2021 2003    Acceptance, E,TB, VU by RF at 9/2/2021 1610    Acceptance, E,TB, VU by DG at 9/1/2021 2256    Acceptance, E,TB, VU by RF at 9/1/2021 1548    Acceptance, E,TB, VU by RF at 8/31/2021 1423    Acceptance, E,TB, VU by RF at 8/30/2021 1548                   Point: Home exercise program (Done)     Learning Progress Summary           Patient Acceptance, E,TB, VU by RF at 9/8/2021 1602    Acceptance, E,TB, VU by RF at 9/7/2021 1605    Acceptance, E,TB, VU by RF at 9/6/2021 1557    Acceptance, E,TB, VU by RF at 9/3/2021 1532    Acceptance, E,TB, VU by DG at 9/2/2021 2003    Acceptance, E,TB, VU by RF at 9/2/2021 1610    Acceptance, E,TB, VU by DG at 9/1/2021 2256    Acceptance, E,TB, VU by RF at 9/1/2021 1548    Acceptance, E,TB, VU by RF at 8/31/2021 1423    Acceptance, E,TB, VU by RF at 8/30/2021 1548                   Point: Body mechanics (Done)     Learning Progress Summary           Patient Acceptance, E,TB, VU by RF at 9/8/2021 1602    Acceptance, E,TB, VU by RF at 9/7/2021 1605    Acceptance, E,TB, VU by RF at 9/6/2021  1557    Acceptance, E,TB, VU by RF at 9/3/2021 1532    Acceptance, E,TB, VU by DG at 9/2/2021 2003    Acceptance, E,TB, VU by RF at 9/2/2021 1610    Acceptance, E,TB, VU by DG at 9/1/2021 2256    Acceptance, E,TB, VU by RF at 9/1/2021 1548    Acceptance, E,TB, VU by RF at 8/31/2021 1423    Acceptance, E,TB, VU by RF at 8/30/2021 1548                   Point: Precautions (Done)     Learning Progress Summary           Patient Acceptance, E,TB, VU by RF at 9/8/2021 1602    Acceptance, E,TB, VU by RF at 9/7/2021 1605    Acceptance, E,TB, VU by RF at 9/6/2021 1557    Acceptance, E,TB, VU by RF at 9/3/2021 1532    Acceptance, E,TB, VU by DG at 9/2/2021 2003    Acceptance, E,TB, VU by RF at 9/2/2021 1610    Acceptance, E,TB, VU by DG at 9/1/2021 2256    Acceptance, E,TB, VU by RF at 9/1/2021 1548    Acceptance, E,TB, VU by RF at 8/31/2021 1423    Acceptance, E,TB, VU by RF at 8/30/2021 1548                               User Key     Initials Effective Dates Name Provider Type Discipline     06/16/21 -  Melissa Swift, RN Registered Nurse Nurse     06/16/21 -  Malinda Alicea PTA Physical Therapy Assistant PT                PT Recommendation and Plan    Frequency of Treatment (PT): 5 times per week     Daily Progress Summary (PT)  Functional Goal Overall Progress (PT): progressing toward functional goals as expected  Daily Progress Summary (PT): Patient demonstrates improvements in ambualtion quality this date as patient able to advance RLE. INcreased assistance conitnually required for functional transferring. Continued skilled care required for further improvements to ensure maximum safe function upon D/C.   Impairments Still Limiting Function (PT): ROM decreased, sensation decreased, strength decreased, impaired balance, impaired communication, impaired functional activity tolerance, motor control impaired, postural control impaired, sensory feedback impaired, pain  Recommendations (PT): Continue per current POC.                 Time Calculation:     PT Charges     Row Name 09/08/21 1603 09/08/21 1602          Time Calculation    Start Time  1245  -RF  1045  -RF     Stop Time  1330  -RF  1130  -RF     Time Calculation (min)  45 min  -RF  45 min  -RF     PT Received On  09/08/21  -RF  09/08/21  -RF     PT - Next Appointment  09/09/21  -RF  09/08/21  -RF     PT Goal Re-Cert Due Date  09/10/21  -RF  09/10/21  -RF        Time Calculation- PT    Total Timed Code Minutes- PT  45 minute(s)  -RF  45 minute(s)  -RF       User Key  (r) = Recorded By, (t) = Taken By, (c) = Cosigned By    Initials Name Provider Type    RF Malinda Alicea, PTA Physical Therapy Assistant          Therapy Charges for Today     Code Description Service Date Service Provider Modifiers Qty    93307092163 HC GAIT TRAINING EA 15 MIN 9/7/2021 Malinda Alicea, PTA GP, KX 2    79673186162 HC PT NEUROMUSC RE EDUCATION EA 15 MIN 9/7/2021 Malinda Alicea, PTA GP, KX 2    61531536845 HC PT THER PROC EA 15 MIN 9/7/2021 Malinda Alicea, PTA GP, KX 2    17228441344 HC PT THERAPEUTIC ACT EA 15 MIN 9/7/2021 Malinda Alicea, PTA GP, KX 1    51001500660 HC GAIT TRAINING EA 15 MIN 9/8/2021 Malinda Alicea, PTA GP, KX 2    13952765827 HC PT NEUROMUSC RE EDUCATION EA 15 MIN 9/8/2021 Malinda Alicea, PTA GP, KX 1    75827135112 HC PT THER PROC EA 15 MIN 9/8/2021 Malinda Alicea, PTA GP, KX 2    96359891437 HC PT THERAPEUTIC ACT EA 15 MIN 9/8/2021 Malinda Alicea, PTA GP, KX 1                   Malindaswapnil Alicea, PTA  9/8/2021

## 2021-09-08 NOTE — THERAPY TREATMENT NOTE
Inpatient Rehabilitation - Occupational Therapy Treatment Note     Tre     Patient Name: Joycelyn Brown  : 1940  MRN: [id number]    Today's Date: 2021                 Admit Date: 2021         ICD-10-CM ICD-9-CM   1. CVA (cerebral vascular accident) (CMS/[facility])  I63.9 434.91       Patient Active Problem List   Diagnosis   • CVA (cerebral vascular accident) (CMS/[facility])       History reviewed. No pertinent past medical history.    No past surgical history on file.             IRF OT ASSESSMENT FLOWSHEET (last 12 hours)      IRF OT Evaluation and Treatment     Row Name 21 1400          OT Time and Intention    Document Type  daily treatment  -     Mode of Treatment  individual therapy;occupational therapy  -     Patient Effort  good  -     Row Name 21 1400          General Information    Patient/Family/Caregiver Comments/Observations  patient continues to be agreeable to therapy. patient tolerating therapy with no complaintsl  -     Existing Precautions/Restrictions  fall;oxygen therapy device and L/min  -     Row Name 21 1400          Transfers    Bed-Chair [place] (Transfers)  moderate assist (50% patient effort);2 person assist  -     Chair-Bed [place] (Transfers)  moderate assist (50% patient effort);2 person assist  -     Row Name 21 1400          Motor Skills    Motor Skills  coordination;functional endurance;neuro-muscular function  -     Therapeutic Exercise  -- RUE ROM, PROM.AAROM, grasp, vibration  -       User Key  (r) = Recorded By, (t) = Taken By, (c) = Cosigned By    Initials Name Effective Dates     Maria Guadalupe Monge, OT 21 -            Occupational Therapy Education                 Title: PT OT SLP Therapies (Done)     Topic: Occupational Therapy (Done)     Point: ADL training (Done)     Description:   Instruct learner(s) on proper safety adaptation and remediation techniques during self care or transfers.   Instruct in  proper use of assistive devices.              Learning Progress Summary           Patient Acceptance, E,TB, VU by DG at 9/2/2021 2003    Acceptance, E,D, VU,NR by  at 9/2/2021 1458    Acceptance, E,TB, VU by  at 9/1/2021 2256    Acceptance, E,D, VU,NR by  at 9/1/2021 1520    Acceptance, E,D, VU,NR by  at 8/31/2021 1541    Acceptance, E, VU,NR by  at 8/30/2021 1507    Acceptance, E,TB, VU by  at 8/28/2021 2040    Acceptance, E,D, VU,NR by AB at 8/28/2021 1410                   Point: Home exercise program (Done)     Description:   Instruct learner(s) on appropriate technique for monitoring, assisting and/or progressing therapeutic exercises/activities.              Learning Progress Summary           Patient Acceptance, E,TB, VU by DG at 9/2/2021 2003    Acceptance, E,TB, VU by  at 9/1/2021 2256    Acceptance, E,TB, VU by  at 8/28/2021 2040                   Point: Precautions (Done)     Description:   Instruct learner(s) on prescribed precautions during self-care and functional transfers.              Learning Progress Summary           Patient Acceptance, E,TB, VU by  at 9/2/2021 2003    Acceptance, E,D, VU,NR by  at 9/2/2021 1458    Acceptance, E,TB, VU by  at 9/1/2021 2256    Acceptance, E,D, VU,NR by  at 9/1/2021 1520    Acceptance, E,D, VU,NR by  at 8/31/2021 1541    Acceptance, E, VU,NR by  at 8/30/2021 1507    Acceptance, E,TB, VU by  at 8/28/2021 2040    Acceptance, E,D, VU,NR by AB at 8/28/2021 1410                               User Key     Initials Effective Dates Name Provider Type Discipline    AB 06/16/21 -  Yazmin Chaves, OT Occupational Therapist OT     06/16/21 -  Melissa Swift, RN Registered Nurse Nurse     06/16/21 -  Debbie Mehta, OT Occupational Therapist OT     06/16/21 -  Lucrecia Andrews, OT Occupational Therapist OT                    OT Recommendation and Plan                         Time Calculation:     Time Calculation- OT     Row  Name 09/08/21 1455             Time Calculation- OT    OT Start Time  1000  -      OT Stop Time  1045  -      OT Time Calculation (min)  45 min  -        User Key  (r) = Recorded By, (t) = Taken By, (c) = Cosigned By    Initials Name Provider Type     Maria Guadalupe Monge OT Occupational Therapist        Therapy Charges for Today     Code Description Service Date Service Provider Modifiers Qty    62367089256 HC OT SELF CARE/MGMT/TRAIN EA 15 MIN 9/7/2021 Maria Guadalupe Monge, OT GO, KX 1    19222846300 HC OT THERAPEUTIC ACT EA 15 MIN 9/7/2021 Maria Guadalupe Monge, OT GO, KX 2    29490686958 HC OT NEUROMUSC RE EDUCATION EA 15 MIN 9/7/2021 Maria Guadalupe Monge, OT GO, KX 3    31538641486 HC OT SELF CARE/MGMT/TRAIN EA 15 MIN 9/8/2021 Maria Guadalupe Monge, OT GO, KX 2    46832828889 HC OT NEUROMUSC RE EDUCATION EA 15 MIN 9/8/2021 Maria Guadalupe Monge, OT GO, KX 2    16778124528 HC OT THERAPEUTIC ACT EA 15 MIN 9/8/2021 Maria Guadalupe Monge, OT GO, KX 2                   Maria Guadalupe Monge, OT  9/8/2021

## 2021-09-08 NOTE — THERAPY TREATMENT NOTE
Inpatient Rehabilitation - Speech Language Pathology   Swallow Treatment Note [facility] [place]     Patient Name: Joycelyn Brown  : 1940  MRN: [id number]  Today's Date: 2021               Admit Date: 2021    Visit Dx:     ICD-10-CM ICD-9-CM   1. CVA (cerebral vascular accident) (CMS/[facility])  I63.9 434.91     Patient Active Problem List   Diagnosis   • CVA (cerebral vascular accident) (CMS/[facility])     History reviewed. No pertinent past medical history.  No past surgical history on file.    Dysphagia Tx Note:      Ms. Brown is seen in pt room 110A of inpatient physical rehabilitation this am and pm for formal dysphagia tx.  She is cooperative to participate on this date.   She is noted w/ improved endurance on this date.  She is s/p MBS on 9/3/21 w/ recommendations to continue nectar thick liquids.       Long Term Goal:  Pt will accept least restrictive diet tolerance w/o overt s/s aspiration.      Short Term Goals:  1. Pt will tolerate facial massage to R  Facial surface for 3-7 minutes to increase surface blood flow, sensation and ROM over 3 consecutive sessions.   *AM: R mechanical facial massage for 7 Minutes w/ mildly increased surface blood flow.     2. Pt will perform OROM/MONAE exercises x3 sets x10 reps w/ min cues.  *AM: x1 sets x10 reps w/ mod cues.     3. Pt will perform resistive breathing exercises x3 sets x5 reps w/resistance of 2-5  To improve respiratory support and control.   *AM: x2 sets x10 reps w/ inhale/exhale level 3/3 w/ min cues     4. Pt will perform laryngeal adduction/elevation exercises x3 sets x10 reps w/ min cues.   *AM: Avg duration of 9.82 sec pre-breather x1 set x5 reps.  *AM: Avg duration of 7.94 sec post-breather x1 set x5 reps      5. Pt will perform Shaker exercises sustained x3 sets x5 reps for 30+ seconds over 3 consecutive sessions.   *Not addressed on this date.       6. Pt will perform Shaker exercises repetitive x3 sets x12 reps w/ mod cues.   *AM: x3 sets x10 reps w/ min  cues.  Fatigue effects noted.       7. Pt will perform compensatory techniques during meals w/ min cues.  *AM & PM: Pt reports decreased instances of requiring finger sweep for food remnants in R buccal area. Pt felt to benefit from upgraded po diet of mechanical soft solids, whole foods.         8. Pt will participate in instrumental re-evaluation of swallowing fnx in 7-10 days, pending progress towards this poc.  *pending MBS.    Thank you-  Maryam Jones M.S., [facility]-SLP      -------------------------------------------------------------------------------------------------------      DYSARTHRIA THERAPY PLAN OF CARE:     Ms. Brown is seen at bedside of inpatient physical rehabilitation this am for formal dysphagia tx.  She is cooperative to participate on this date.   She is noted w/ moderate fatigue and requires rest periods across therapy tasks.      Long Term Goal:  Pt will improve motor speech to allow for maximal communication and safety in adls.      Short Term Goals:  1. Pt will tolerate facial massage to R dacial surface for 3-7 minutes to increase surface blood flow, sensation and ROM over 3 consecutive sessions.   *AM: R mechanical facial massage for 7 Minutes w/ mildly increased surface blood flow.    2. Pt will perform OROM/MONAE exercises x3 sets x10 reps w/ min cues.  *x3 sets x10 reps w/ mod cues.   *AM: x1 sets x 10 reps w/ min cues.    3. Pt will perform resistive breathing exercises x3 sets x5 reps w/resistance of 2-5 to improve respiratory support and control.   *AM: x2 sets x10 reps w/ inhale/exhale level 3/3 w/ mod cues    4. Pt will accurately produce multi-syllabic words 4/5 opp w/ min cues over 3 consecutive sessions.   *AM: 4/5 opp w/ min cues    5. Pt will overarticulate and hyperphonate in connected speech 4/5 opp w/ min cues over 3 consecutive sessions.   *AM: 3/5 opp w/ min cues in general conversation     6. Pt will improve intelligibility to 100% for both known/unknown context in  conversation w/ min cues over 3 consecutive sessions.   *PM: approx 97% intelligible w/ unknown context and min cues for decreased rate and overarticulation.      *Goals to be added/modified as necessary.      Thank you-  Maryam Jones M.S., CCC-SLP        SLP Recommendation and Plan  Continue modified po diet and tx per poc.     EDUCATION  The patient has been educated in the following areas:   Dysphagia (Swallowing Impairment) Modified Diet Instruction.     Time Calculation:   Time Calculation- SLP     Row Name 09/08/21 0921             Time Calculation- SLP    SLP Start Time  0835  -      SLP Stop Time  0915  -      SLP Time Calculation (min)  40 min  -      SLP - Next Appointment  09/09/21  -        User Key  (r) = Recorded By, (t) = Taken By, (c) = Cosigned By    Initials Name Provider Type    Maryam Adams MS, CFY-SLP Speech and Language Pathologist        Therapy Charges for Today     Code Description Service Date Service Provider Modifiers Qty    79131349083 HC ST TREATMENT SWALLOW 2 9/7/2021 Maryam Jones MS, CFY-SLP GN, KX 1    [id number] HC ST TREATMENT SPEECH 1 9/7/2021 Maryam Jones MS, CFY-SLP GN, KX 1    81460796155 HC ST TREATMENT SWALLOW 2 9/7/2021 Maryam Jones MS, CFY-SLP GN, KX 1    03442986463 HC ST TREATMENT SPEECH 1 9/7/2021 Maryam Jones MS, CFY-SLP GN, KX 1    93968298899 HC ST TREATMENT SPEECH 1 9/8/2021 Maryam Jones MS, CFY-SLP GN, KX 1    62260103505 HC ST TREATMENT SWALLOW 2 9/8/2021 Maryam Jones MS, CFY-SLP GN, KX 1        Patient was not wearing a face mask during this therapy encounter. Therapist used appropriate personal protective equipment including mask, eye protection and gloves.  Mask used was standard procedure mask. Appropriate PPE was worn during the entire therapy session. The patient did not cough during this evaluation. Therapist was within 6 feet for 15 minutes or more during the evaluation. Hand hygiene was completed before and after therapy session. Patient is not  in enhanced droplet precautions.     Maryam Jones MS, CFY-SLP  9/8/2021

## 2021-09-08 NOTE — PROGRESS NOTES
Rehabilitation Nursing  Inpatient Rehabilitation Plan of Care Note    Plan of Care  Pain    Pain Management (Active)  Current Status (8/27/2021 10:38:00 AM): potential for pain  Weekly Goal: No pain this week  Discharge Goal: No pain    Safety    Potential for Injury (Active)  Current Status (8/27/2021 10:38:00 AM): At risk for injury  Weekly Goal: No injury this week  Discharge Goal: No injuriesC    Signed by: Babs Medrano Nurse

## 2021-09-09 LAB
GLUCOSE BLDC GLUCOMTR-MCNC: 146 MG/DL (ref 70–130)
GLUCOSE BLDC GLUCOMTR-MCNC: 260 MG/DL (ref 70–130)
GLUCOSE BLDC GLUCOMTR-MCNC: 330 MG/DL (ref 70–130)
GLUCOSE BLDC GLUCOMTR-MCNC: 354 MG/DL (ref 70–130)

## 2021-09-09 PROCEDURE — 94799 UNLISTED PULMONARY SVC/PX: CPT

## 2021-09-09 PROCEDURE — 63710000001 INSULIN DETEMIR PER 5 UNITS: Performed by: INTERNAL MEDICINE

## 2021-09-09 PROCEDURE — 97116 GAIT TRAINING THERAPY: CPT

## 2021-09-09 PROCEDURE — 97535 SELF CARE MNGMENT TRAINING: CPT | Performed by: OCCUPATIONAL THERAPIST

## 2021-09-09 PROCEDURE — 97112 NEUROMUSCULAR REEDUCATION: CPT

## 2021-09-09 PROCEDURE — 92526 ORAL FUNCTION THERAPY: CPT

## 2021-09-09 PROCEDURE — 99231 SBSQ HOSP IP/OBS SF/LOW 25: CPT | Performed by: FAMILY MEDICINE

## 2021-09-09 PROCEDURE — 97530 THERAPEUTIC ACTIVITIES: CPT

## 2021-09-09 PROCEDURE — 63710000001 INSULIN ASPART PER 5 UNITS: Performed by: INTERNAL MEDICINE

## 2021-09-09 PROCEDURE — 97112 NEUROMUSCULAR REEDUCATION: CPT | Performed by: OCCUPATIONAL THERAPIST

## 2021-09-09 PROCEDURE — 82962 GLUCOSE BLOOD TEST: CPT

## 2021-09-09 PROCEDURE — 97032 APPL MODALITY 1+ESTIM EA 15: CPT | Performed by: OCCUPATIONAL THERAPIST

## 2021-09-09 PROCEDURE — 97110 THERAPEUTIC EXERCISES: CPT

## 2021-09-09 PROCEDURE — 92507 TX SP LANG VOICE COMM INDIV: CPT

## 2021-09-09 RX ADMIN — AMLODIPINE BESYLATE 10 MG: 10 TABLET ORAL at 08:47

## 2021-09-09 RX ADMIN — PANTOPRAZOLE SODIUM 40 MG: 40 TABLET, DELAYED RELEASE ORAL at 05:20

## 2021-09-09 RX ADMIN — CHLORTHALIDONE 25 MG: 50 TABLET ORAL at 08:47

## 2021-09-09 RX ADMIN — APIXABAN 5 MG: 5 TABLET, FILM COATED ORAL at 20:27

## 2021-09-09 RX ADMIN — INSULIN ASPART 6 UNITS: 100 INJECTION, SOLUTION INTRAVENOUS; SUBCUTANEOUS at 16:58

## 2021-09-09 RX ADMIN — APIXABAN 5 MG: 5 TABLET, FILM COATED ORAL at 08:47

## 2021-09-09 RX ADMIN — CARVEDILOL 12.5 MG: 6.25 TABLET, FILM COATED ORAL at 08:47

## 2021-09-09 RX ADMIN — Medication 1 TABLET: at 08:50

## 2021-09-09 RX ADMIN — LINAGLIPTIN 5 MG: 5 TABLET, FILM COATED ORAL at 08:47

## 2021-09-09 RX ADMIN — INSULIN ASPART 5 UNITS: 100 INJECTION, SOLUTION INTRAVENOUS; SUBCUTANEOUS at 11:31

## 2021-09-09 RX ADMIN — KETOTIFEN FUMARATE 1 DROP: 0.35 SOLUTION/ DROPS OPHTHALMIC at 16:16

## 2021-09-09 RX ADMIN — Medication 10 MG: at 20:27

## 2021-09-09 RX ADMIN — INSULIN DETEMIR 20 UNITS: 100 INJECTION, SOLUTION SUBCUTANEOUS at 20:27

## 2021-09-09 RX ADMIN — LEVOTHYROXINE SODIUM 50 MCG: 50 TABLET ORAL at 05:20

## 2021-09-09 RX ADMIN — SERTRALINE 50 MG: 50 TABLET, FILM COATED ORAL at 08:47

## 2021-09-09 RX ADMIN — Medication 1000 MCG: at 08:47

## 2021-09-09 RX ADMIN — DOCUSATE SODIUM 50 MG AND SENNOSIDES 8.6 MG 1 TABLET: 8.6; 5 TABLET, FILM COATED ORAL at 20:27

## 2021-09-09 RX ADMIN — DOCUSATE SODIUM 50 MG AND SENNOSIDES 8.6 MG 1 TABLET: 8.6; 5 TABLET, FILM COATED ORAL at 08:47

## 2021-09-09 RX ADMIN — INSULIN DETEMIR 20 UNITS: 100 INJECTION, SOLUTION SUBCUTANEOUS at 10:01

## 2021-09-09 RX ADMIN — KETOTIFEN FUMARATE 1 DROP: 0.35 SOLUTION/ DROPS OPHTHALMIC at 20:26

## 2021-09-09 RX ADMIN — CARVEDILOL 12.5 MG: 6.25 TABLET, FILM COATED ORAL at 17:05

## 2021-09-09 RX ADMIN — ATORVASTATIN CALCIUM 80 MG: 40 TABLET, FILM COATED ORAL at 20:27

## 2021-09-09 RX ADMIN — KETOTIFEN FUMARATE 1 DROP: 0.35 SOLUTION/ DROPS OPHTHALMIC at 08:47

## 2021-09-09 RX ADMIN — OXYCODONE HYDROCHLORIDE AND ACETAMINOPHEN 500 MG: 500 TABLET ORAL at 08:47

## 2021-09-09 RX ADMIN — AMLODIPINE BESYLATE 10 MG: 10 TABLET ORAL at 20:27

## 2021-09-09 NOTE — PROGRESS NOTES
[facility]  PROGRESS NOTE     Patient Identification:  Name:  Joycelyn Brown  Age:  81 y.o.  Sex:  female  :  1940  MRN:  [id number]  Visit Number:  [id number]  ROOM: 110/1S     Primary Care Provider:  Donya Looney APRN    Length of stay in inpatient status:  13    Subjective     Chief Compliant:  No chief complaint on file.      History of Presenting Illness: 81-year-old female with history of left MCA pontine CVA.  Patient has a fairly dense right-sided hemiparesis.  Patient states she is doing reasonably well    Objective     Current Hospital Meds:amLODIPine, 10 mg, Oral, BID  apixaban, 5 mg, Oral, Q12H  ascorbic acid, 500 mg, Oral, Daily  atorvastatin, 80 mg, Oral, Nightly  carvedilol, 12.5 mg, Oral, BID With Meals  chlorthalidone, 25 mg, Oral, Daily  insulin aspart, 0-7 Units, Subcutaneous, TID AC  insulin detemir, 20 Units, Subcutaneous, Q12H  ketotifen, 1 drop, Both Eyes, TID  levothyroxine, 50 mcg, Oral, Q AM  linagliptin, 5 mg, Oral, Daily  melatonin, 10 mg, Oral, Nightly  multivitamin with minerals, 1 tablet, Oral, Daily  pantoprazole, 40 mg, Oral, Q AM  senna-docusate sodium, 1 tablet, Oral, BID  sertraline, 50 mg, Oral, Daily  vitamin B-12, 1,000 mcg, Oral, Daily       ----------------------------------------------------------------------------------------------------------------------  Vital Signs:  Temp:  [97.9 °F (36.6 °C)-98.1 °F (36.7 °C)] 97.9 °F (36.6 °C)  Heart Rate:  [62-64] 64  Resp:  [18-20] 20  BP: (145)/(54-60) 145/60  SpO2:  [97 %] 97 %  on  Flow (L/min):  [2] 2;   Device (Oxygen Therapy): nasal cannula  Body mass index is 28.19 kg/m².    Wt Readings from Last 3 Encounters:   21 81.6 kg (180 lb)   02/10/15 81.7 kg (180 lb 0.1 oz)     Intake & Output (last 3 days)       701 -  07 07 -  0700  07 -  07 07 - 09/10 0700    P.O. 8496 220 8527 240    Total Intake(mL/kg) 1080 (13.2) 840 (10.3) 1040 (12.7) 240  (2.9)    Net +1080 +840 +1040 +240            Urine Unmeasured Occurrence 5 x 7 x 5 x     Stool Unmeasured Occurrence 1 x 1 x 1 x         Diet Soft Texture; [employer]; [place] / Syrup Thick  ----------------------------------------------------------------------------------------------------------------------  Physical exam:  Constitutional:   No acute distress  HEENT: Normocephalic atraumatic  Neck: Supple   Cardiovascular: Regular rate and rhythm  Pulmonary/Chest: Clear to auscultation  Abdominal: Positive bowel sounds soft  Musculoskeletal: No arthropathy  Neurological: Right-sided hemiparesis  Skin: No rash  Peripheral vascular:  Genitourinary:  ----------------------------------------------------------------------------------------------------------------------    Last echocardiogram:    ----------------------------------------------------------------------------------------------------------------------  Results from last 7 days   Lab Units 09/06/21  0111   WBC 10*3/mm3 11.69*   HEMOGLOBIN g/dL 13.1   HEMATOCRIT % 40.6   MCV fL 89.0   MCHC g/dL 32.3   PLATELETS 10*3/mm3 288         Results from last 7 days   Lab Units 09/06/21  0111   SODIUM mmol/L 138   POTASSIUM mmol/L 4.0   CHLORIDE mmol/L 99   CO2 mmol/L 30.3*   BUN mg/dL 29*   CREATININE mg/dL 1.13*   EGFR IF NONAFRICN AM mL/min/1.73 46*   CALCIUM mg/dL 9.8   GLUCOSE mg/dL 212*   ALBUMIN g/dL 3.22*   BILIRUBIN mg/dL 0.3   ALK PHOS U/L 98   AST (SGOT) U/L 17   ALT (SGPT) U/L 15   Estimated Creatinine Clearance: 43.6 mL/min (A) (by C-G formula based on SCr of 1.13 mg/dL (H)).  No results found for: AMMONIA              Glucose   Date/Time Value Ref Range Status   09/09/2021 1057 330 (H) 70 - 130 mg/dL Final     Comment:     Meter: [id number] : [id number] stepan luque   09/09/2021 0640 146 (H) 70 - 130 mg/dL Final     Comment:     Meter: [id number] : [id number] Sebastian MCMULLEN   09/08/2021 2031 320 (H) 70 - 130 mg/dL Final     Comment:     Meter:  [id number] : [id number] Sebastian Cervantes C   09/08/2021 1636 366 (H) 70 - 130 mg/dL Final     Comment:     Meter: [id number] : [id number] preet bocanegra   09/08/2021 1058 265 (H) 70 - 130 mg/dL Final     Comment:     Meter: [id number] : [id number] Noah Najma   09/08/2021 0628 195 (H) 70 - 130 mg/dL Final     Comment:     Meter: [id number] : [id number] [place] Jemima   09/07/2021 2009 208 (H) 70 - 130 mg/dL Final     Comment:     Meter: [id number] : [id number] Miko Crystal   09/07/2021 1631 190 (H) 70 - 130 mg/dL Final     Comment:     Meter: [id number] : [id number] GREG VERMA     No results found for: TSH, FREET4  No results found for: PREGTESTUR, PREGSERUM, HCG, HCGQUANT  Pain Management Panel    There is no flowsheet data to display.       Brief Urine Lab Results     None        No results found for: BLOODCX      No results found for: URINECX  No results found for: WOUNDCX  No results found for: STOOLCX        I have personally looked at the labs and they are summarized above.  ----------------------------------------------------------------------------------------------------------------------  Detailed radiology reports for the last 24 hours:    Imaging Results (Last 24 Hours)     ** No results found for the last 24 hours. **        Final impressions for the last 30 days of radiology reports:    FL Video Swallow Single Contrast    Result Date: 9/3/2021      Please see the speech pathologist's report for additional information.  This report was finalized on 9/3/2021 12:31 PM by Dr. Anthony Gresham MD.      FL Video Swallow Single Contrast    Result Date: 8/28/2021  1.  Penetration with aspiration of thin liquids. 2. Please see dysphasia notes for further details.  This report was finalized on 8/28/2021 11:49 AM by Dr. Obie Fleming MD.      I have personally looked at the radiology images and read the final radiology report.    Assessment & Plan    Status post left MCA pontine  CVA--patient still recommended to have nectar thickened liquids.  Patient on Eliquis and statin therapy.  Patient requiring maximum assistance to help with bed mobility; recommend moderate to maximum assistance for help with transfers; maximum assistance for help with gait walked 6 feet x 3 with the parallel bars.  Speech therapy seeing patient continue modified diet at this time    Constipation--continue MiraLAX and Senokot    Hypertension continue Norvasc, Coreg, chlorthalidone.    Depression increased Zoloft yesterday to 50 mg daily    Hypothyroidism continue Synthroid  VTE Prophylaxis:   Mechanical Order History:      Ordered        08/27/21 1125  Maintain Sequential Compression Device  Continuous         08/27/21 1125  Place Sequential Compression Device  Once                 Pharmalogical Order History:      Ordered     Dose Route Frequency Stop    08/27/21 1125  apixaban (ELIQUIS) tablet 5 mg      5 mg PO Every 12 Hours Scheduled --                  Garrett Sneed MD  [facility]  09/09/21  11:55 EDT

## 2021-09-09 NOTE — THERAPY TREATMENT NOTE
Inpatient Rehabilitation - Occupational Therapy Treatment Note     Tre     Patient Name: Joycelyn Brown  : 1940  MRN: [id number]    Today's Date: 2021                 Admit Date: 2021         ICD-10-CM ICD-9-CM   1. CVA (cerebral vascular accident) (CMS/[facility])  I63.9 434.91       Patient Active Problem List   Diagnosis   • CVA (cerebral vascular accident) (CMS/[facility])       History reviewed. No pertinent past medical history.    No past surgical history on file.             IRF OT ASSESSMENT FLOWSHEET (last 12 hours)      IRF OT Evaluation and Treatment     [facility] Name 21 1500          OT Time and Intention    Document Type  daily treatment;progress note  -     Mode of Treatment  individual therapy;occupational therapy  -     Patient Effort  good  -[facility] Name 21 1500          General Information    Patient/Family/Caregiver Comments/Observations  patient agreeable to therapy this am. patient continues to improve with functional mobility, right UE  -     Existing Precautions/Restrictions  fall;oxygen therapy device and L/min  -AH     Row Name 21 1500          Transfers    Chair-Bed [place] (Transfers)  moderate assist (50% patient effort);2 person assist  -AH     Row Name 21 1500          Motor Skills    Motor Skills  functional endurance;neuro-muscular function  -     Therapeutic Exercise  -- AUSTINE YEE, PROM, grasp and release, facilitation  -AH     Row Name 21 1500          Bathing    [place] Level (Bathing)  maximum assist (25% patient effort)  -AH     Row Name 21 1500          Upper Body Dressing    [place] Level (Upper Body Dressing)  minimum assist (75% or more patient effort);moderate assist (50-74% patient effort)  -AH     Row Name 21 1500          Lower Body Dressing    [place] Level (Lower Body Dressing)  maximum assist (25% patient effort)  -AH     Row Name 21 1500          Grooming    [place] Level (Grooming)   minimum assist (75% patient effort);hair care, combing/brushing  -[facility] Name 09/09/21 1500          Toileting    [place] Level (Toileting)  maximum assist (25% patient effort);dependent (less than 25% patient effort)  -AH     Row Name 09/09/21 1500          Self-Feeding    [place] Level (Self-Feeding)  supervision;standby assist  -AH     Row Name 09/09/21 1500          Modality Intervention    Additional Documentation  -- NMES right wrist and hand functional grasp and ext  -AH     Row Name 09/09/21 1500          UB Dressing Goal 1 (OT-IRF)    Progress/Outcomes (UB Dressing Goal 1, OT-IRF)  goal met  -[facility] Name 09/09/21 1500          UB Dressing Goal 2 (OT-IRF)    Progress/Outcomes (UB Dressing Goal 2, OT-IRF)  goal partially met;goal ongoing  -[facility] Name 09/09/21 1500          Grooming Goal 1 (OT-IRF)    Progress/Outcomes (Grooming Goal 1, OT-IRF)  goal met  -AH     Row Name 09/09/21 1500          Grooming Goal 2 (OT-IRF)    Progress/Outcomes (Grooming Goal 2, OT-IRF)  goal partially met;goal ongoing  -[facility] Name 09/09/21 1500          Toileting Goal 1 (OT-IRF)    Progress/Outcomes (Toileting Goal 1, OT-IRF)  goal partially met;goal ongoing  -       User Key  (r) = Recorded By, (t) = Taken By, (c) = Cosigned By    Initials Name Effective Dates    JESUS AndersonMongeMaria Guadalupe barclay, OT 06/16/21 -            Occupational Therapy Education                 Title: PT OT SLP Therapies (Done)     Topic: Occupational Therapy (Done)     Point: ADL training (Done)     Description:   Instruct learner(s) on proper safety adaptation and remediation techniques during self care or transfers.   Instruct in proper use of assistive devices.              Learning Progress Summary           Patient Acceptance, E,TB, VU by BEVERLY at 9/2/2021 2003    Acceptance, E,D, VU,NR by TONJA at 9/2/2021 1458    Acceptance, E,TB, VU by BEVERLY at 9/1/2021 2256    Acceptance, E,D, VU,NR by TONJA at 9/1/2021 1520    Acceptance, E,D, VU,NR by   at 8/31/2021 1541    Acceptance, E, VU,NR by  at 8/30/2021 1507    Acceptance, E,TB, VU by  at 8/28/2021 2040    Acceptance, E,D, VU,NR by AB at 8/28/2021 1410                   Point: Home exercise program (Done)     Description:   Instruct learner(s) on appropriate technique for monitoring, assisting and/or progressing therapeutic exercises/activities.              Learning Progress Summary           Patient Acceptance, E,TB, VU by DG at 9/2/2021 2003    Acceptance, E,TB, VU by  at 9/1/2021 2256    Acceptance, E,TB, VU by  at 8/28/2021 2040                   Point: Precautions (Done)     Description:   Instruct learner(s) on prescribed precautions during self-care and functional transfers.              Learning Progress Summary           Patient Acceptance, E,TB, VU by  at 9/2/2021 2003    Acceptance, E,D, VU,NR by  at 9/2/2021 1458    Acceptance, E,TB, VU by  at 9/1/2021 2256    Acceptance, E,D, VU,NR by  at 9/1/2021 1520    Acceptance, E,D, VU,NR by  at 8/31/2021 1541    Acceptance, E, VU,NR by  at 8/30/2021 1507    Acceptance, E,TB, VU by  at 8/28/2021 2040    Acceptance, E,D, VU,NR by AB at 8/28/2021 1410                               User Key     Initials Effective Dates Name Provider Type Discipline    AB 06/16/21 -  Yazmin Chaves, OT Occupational Therapist OT     06/16/21 -  Melissa Swift, RN Registered Nurse Nurse     06/16/21 -  Debbie Mehta, OT Occupational Therapist OT     06/16/21 -  Lucrecia Andrews, OT Occupational Therapist OT                    OT Recommendation and Plan                         Time Calculation:     Time Calculation- OT     Row Name 09/09/21 1512             Time Calculation- OT    OT Start Time  0745  -      OT Stop Time  0915  -      OT Time Calculation (min)  90 min  -        User Key  (r) = Recorded By, (t) = Taken By, (c) = Cosigned By    Initials Name Provider Type     Maria Guadalupe Monge, OT Occupational Therapist         Therapy Charges for Today     Code Description Service Date Service Provider Modifiers Qty    43441301519 HC OT SELF CARE/MGMT/TRAIN EA 15 MIN 9/8/2021 Maria Guadalupe Monge OT GO, KX 2    82471391861 HC OT NEUROMUSC RE EDUCATION EA 15 MIN 9/8/2021 Maria Guadalupe Monge OT GO, KX 2    85343769416 HC OT THERAPEUTIC ACT EA 15 MIN 9/8/2021 Maria Guadalupe Monge OT GO, KX 2    83047413259 HC OT SELF CARE/MGMT/TRAIN EA 15 MIN 9/9/2021 Maria Guadalupe Monge OT GO, KX 2    04847972976 HC OT NEUROMUSC RE EDUCATION EA 15 MIN 9/9/2021 Maria Guadalupe Monge OT GO, KX 3    85674915766 HC OT ELEC STIM EA-PER 15 MIN 9/9/2021 Maria Guadalupe Monge OT GO, KX 1                   Maria Guadalupe Monge OT  9/9/2021

## 2021-09-09 NOTE — THERAPY TREATMENT NOTE
Inpatient Rehabilitation - Speech Language Pathology   Swallow Treatment Note [facility] [place]     Patient Name: Joycelyn Brown  : 1940  MRN: [id number]  Today's Date: 2021               Admit Date: 2021    Visit Dx:     ICD-10-CM ICD-9-CM   1. CVA (cerebral vascular accident) (CMS/[facility])  I63.9 434.91     Patient Active Problem List   Diagnosis   • CVA (cerebral vascular accident) (CMS/[facility])     History reviewed. No pertinent past medical history.  No past surgical history on file.    Dysphagia Tx Note:      Ms. Brown is seen in speech therapy office of inpatient physical rehabilitation this am and pm for formal dysphagia tx.  She is cooperative to participate on this date.   She is noted w/ improved endurance on this date.  She is s/p MBS on 9/3/21 w/ recommendations to continue nectar thick liquids.       Long Term Goal:  Pt will accept least restrictive diet tolerance w/o overt s/s aspiration.      Short Term Goals:  1. Pt will tolerate facial massage to R  Facial surface for 3-7 minutes to increase surface blood flow, sensation and ROM over 3 consecutive sessions.   *AM: R mechanical facial massage for 7 Minutes w/ mildly increased surface blood flow.   *PM: R mechanical facial massage for 7 minutes w/ mildly increased surface blood flow.     2. Pt will perform OROM/MONAE exercises x3 sets x10 reps w/ min cues.  *AM: x2 sets x10 reps w/ min cues.  *PM: x3 sets x10 reps w/ min cues     3. Pt will perform resistive breathing exercises x3 sets x5 reps w/resistance of 2-5  To improve respiratory support and control.   *AM: x3 sets x10 reps w/ inhale/exhale level 3/3 w/ min cues   *PM: x2 sets x10 reps w/ inhale/exhale level 3/3 w/ min cues  *PM: x2 sets x10 reps w/ inhale/exhale level 4/4 w/ min cues.     4. Pt will perform laryngeal adduction/elevation exercises x3 sets x10 reps w/ min cues.   *AM: Avg duration of 9.66 sec pre-breather x1 set x5 reps.  *AM: Avg duration of 10.05 sec post-breather x2 set x5  "reps  *PM: Avg duration of 8.01 sec pre-breather x1 set x5 reps  *PM: Avg duration of 8.54 sec post-breather x1 set x5 reps      5. Pt will perform Shaker exercises sustained x3 sets x5 reps for 30+ seconds over 3 consecutive sessions.   *AM: CTAR sustained x1 set x2 reps w/ avg duration of 17.97 seconds w/ min cues    6. Pt will perform Shaker exercises repetitive x3 sets x12 reps w/ mod cues.   *AM: CTAR repetitive x1 set x20 reps, x1 set x10 reps w/ min cues   *PM: Shaker repetitive x2 sets x10 reps w/ min cues      7. Pt will perform compensatory techniques during meals w/ min cues.  *AM: Pt reports no po intake for breakfast this am.   *PM: Pt reports no difficulty w/ po intake for lunch.         8. Pt will participate in instrumental re-evaluation of swallowing fnx in 7-10 days, pending progress towards this poc.  *pending MBS.    *Pt accepts multiple po trials of thin water via cup and straw w/ overt s/s aspiration approx 1/10 trials.  Pt reports \"getting choked\" if she takes a \"regular\" sip of water or \"several drinks in a row\".  She reports no overt s/s aspiration w/ consistent small sips of water.      Thank you-  Maryam Jones M.S., [facility]-SLP      -------------------------------------------------------------------------------------------------------      DYSARTHRIA THERAPY PLAN OF CARE:     Ms. Brown is seen in speech therapy office of inpatient physical rehabilitation this am for formal dysarthria tx.  She is cooperative to participate on this date.   She is noted w/ moderate fatigue and requires rest periods across therapy tasks.      Long Term Goal:  Pt will improve motor speech to allow for maximal communication and safety in adls.      Short Term Goals:  1. Pt will tolerate facial massage to R dacial surface for 3-7 minutes to increase surface blood flow, sensation and ROM over 3 consecutive sessions.   *AM: R mechanical facial massage for 7 Minutes w/ mildly increased surface blood flow.  *PM: R " mechanical facial massage for 7 minutes w/ mildly increased surface blood flow.     2. Pt will perform OROM/MONAE exercises x3 sets x10 reps w/ min cues.  *x3 sets x10 reps w/ mod cues.   *AM: x2 sets x 10 reps w/ min cues.  *PM: x3 sets x10 reps w/ min cues     3. Pt will perform resistive breathing exercises x3 sets x5 reps w/resistance of 2-5 to improve respiratory support and control.   *AM: x3 sets x10 reps w/ inhale/exhale level 3/3 w/ mod cues  *PM: x2 sets x10 reps w/ inhale/exhale level 3/3 w/ min cues  *PM: x2 sets x10 reps w/ inhale/exhale level 4/4 w/ min cues.    4. Pt will accurately produce multi-syllabic words 4/5 opp w/ min cues over 3 consecutive sessions.   *AM: 4/5 opp w/ min cues    5. Pt will overarticulate and hyperphonate in connected speech 4/5 opp w/ min cues over 3 consecutive sessions.   *AM: 4/5 opp w/ min cues in general conversation     6. Pt will improve intelligibility to 100% for both known/unknown context in conversation w/ min cues over 3 consecutive sessions.   *PM: approx 95% intelligible w/ unknown context and min cues for decreased rate and overarticulation.      *Goals to be added/modified as necessary.      Thank you-  Maryam Jones M.S., CCC-SLP        SLP Recommendation and Plan  Continue modified po diet and tx per poc.     EDUCATION  The patient has been educated in the following areas:   Dysphagia (Swallowing Impairment) Modified Diet Instruction.     Time Calculation:   Time Calculation- SLP     Row Name 09/09/21 1022             Time Calculation- SLP    SLP Start Time  0915  -JR      SLP Stop Time  1000  -      SLP Time Calculation (min)  45 min  -      SLP - Next Appointment  09/10/21  -        User Key  (r) = Recorded By, (t) = Taken By, (c) = Cosigned By    Initials Name Provider Type    Maryam Adams MS, CFY-SLP Speech and Language Pathologist        Therapy Charges for Today     Code Description Service Date Service Provider Modifiers Qty    24732913551 [facility]  TREATMENT SPEECH 1 9/8/2021 Robert MaryamMS parvez, CFY-SLP GN, KX 1    13734461148 HC ST TREATMENT SWALLOW 2 9/8/2021 Maryam Jones MS, CFY-SLP GN, KX 1    99657283541 HC ST TREATMENT SPEECH 1 9/9/2021 Robert MaryamMS parvez, CFY-SLP GN, KX 1    [id number] HC ST TREATMENT SWALLOW 2 9/9/2021 Robert MaryamMS parvez, CFY-SLP GN, KX 1        Patient was not wearing a face mask during this therapy encounter. Therapist used appropriate personal protective equipment including mask, eye protection and gloves.  Mask used was standard procedure mask. Appropriate PPE was worn during the entire therapy session. The patient did not cough during this evaluation. Therapist was within 6 feet for 15 minutes or more during the evaluation. Hand hygiene was completed before and after therapy session. Patient is not in enhanced droplet precautions.     Maryam Jones MS, ETIENNE  9/9/2021

## 2021-09-09 NOTE — CASE MANAGEMENT/SOCIAL WORK
Message received from [facility] needing to confirm date of admission.  Date of Rehab admission was 8/27/21.  Auth # [id number].  [facility] ID# [id number].

## 2021-09-09 NOTE — PLAN OF CARE
Goal Outcome Evaluation:   Pt is progressing medically and physically with all therapies.  Continue with POC.

## 2021-09-09 NOTE — PROGRESS NOTES
Occupational Therapy:    Physical Therapy:    Speech Language Pathology: Individual: 85 minutes.    Signed by: MEET Chen

## 2021-09-09 NOTE — THERAPY TREATMENT NOTE
Inpatient Rehabilitation - Physical Therapy Treatment Note       [facility]     Patient Name: Joycelyn Brown  : 1940  MRN: [id number]    Today's Date: 2021                    Admit Date: 2021      Visit Dx:     ICD-10-CM ICD-9-CM   1. CVA (cerebral vascular accident) (CMS/[facility])  I63.9 434.91       Patient Active Problem List   Diagnosis   • CVA (cerebral vascular accident) (CMS/[facility])       History reviewed. No pertinent past medical history.    No past surgical history on file.       PT ASSESSMENT (last 12 hours)      IRF PT Evaluation and Treatment     Row Name 21 1546          PT Time and Intention    Document Type  daily treatment  -RF     Mode of Treatment  physical therapy  -RF     Patient/Family/Caregiver Comments/Observations  Pt demonstrates significnat improvements in gait training this date.   -     Row Name 21 1546          General Information    Patient Profile Reviewed  yes  -     Row Name 21 1546          Cognition/Psychosocial    Affect/Mental Status (Cognitive)  WFL  -RF     Orientation Status (Cognition)  oriented to;place;time  -RF     Follows Commands (Cognition)  WFL;follows one-step commands;over 90% accuracy  -     Row Name 21 1546          Bed Mobility    Bed Mobility  supine-sit;rolling left;rolling right;sit-supine  -RF     Supine-Sit [place] (Bed Mobility)  maximum assist (25% patient effort)  -RF     Sit-Supine [place] (Bed Mobility)  maximum assist (25% patient effort)  -RF     Assistive Device (Bed Mobility)  bed rails  -     Row Name 21 1546          Transfer Assessment/Treatment    Transfers  sit-stand transfer;stand-sit transfer;stand pivot/stand step transfer;bed-chair transfer  -     Row Name 21 1546          Transfers    Bed-Chair [place] (Transfers)  2 person assist;moderate assist (50% patient effort);maximum assist (25% patient effort)  -RF     Chair-Bed [place] (Transfers)  2 person assist;moderate  assist (50% patient effort);maximum assist (25% patient effort)  -RF     Assistive Device (Bed-Chair Transfers)  wheelchair  -RF     Sit-Stand [place] (Transfers)  2 person assist;moderate assist (50% patient effort)  -RF     Stand-Sit [place] (Transfers)  2 person assist;other (see comments);moderate assist (50% patient effort) mod in parallel bars  -RF     Row Name 09/09/21 1546          Chair-Bed Transfer    Assistive Device (Chair-Bed Transfers)  wheelchair  -RF     Row Name 09/09/21 1546          Sit-Stand Transfer    Assistive Device (Sit-Stand Transfers)  wheelchair;other (see comments) Min/Mod in parallel bars  -RF     Row Name 09/09/21 1546          Stand-Sit Transfer    Assistive Device (Stand-Sit Transfers)  wheelchair;other (see comments) parallel bars  -RF     Row Name 09/09/21 1546          Gait/Stairs (Locomotion)    [place] Level (Gait)  2 person assist;moderate assist (50% patient effort)  -RF     Assistive Device (Gait)  other (see comments) parallel bars  -RF     Distance in Feet (Gait)  6'X5  -RF     Pattern (Gait)  step-to  -RF     Bilateral Gait Deviations  forward flexed posture;weight shift ability decreased  -RF     Right Sided Gait Deviations  knee buckling, right side;weight shift ability decreased  -RF     Comment (Gait/Stairs)  Patient demnonstrates improved weightshifting and dyanmic balance with ambulation this date. Pt attempted using HW; increased assistance required.   -     Row Name 09/09/21 1546          Balance    Dynamic Standing Balance  moderate impairment;standing;asymmetrical weight shifting;other (see comments) standing W/S, dynamic balance while managing HW  -     Row Name 09/09/21 1546          Hip (Therapeutic Exercise)    Hip Strengthening (Therapeutic Exercise)  bilateral;flexion;extension;aBduction;aDduction;marching while seated;sitting;resistance band;red;2 lb free weight;2 sets;10 repetitions;other (see comments) Improved activation in hip  flexors  -RF     Row Name 09/09/21 1546          Knee (Therapeutic Exercise)    Knee Strengthening (Therapeutic Exercise)  bilateral;flexion;extension;marching while seated;LAQ (long arc quad);hamstring curls;sitting;2 lb free weight;resistance band;red;2 sets;10 repetitions  -RF     Row Name 09/09/21 1546          Ankle (Therapeutic Exercise)    Ankle Strengthening (Therapeutic Exercise)  bilateral;dorsiflexion;plantarflexion;sitting;2 sets;10 repetitions  -RF     Row Name 09/09/21 1546          Modality Intervention (Comprehensive)    Modality Treatment  Colombian stimulation  -RF     Row Name 09/09/21 1546          Iranian Electrical Stimulation Treatment    Location 1 (Russian E-Stim Treatment)  lower extremity treatment area;other (see comments) R hip flexors  -RF     Indications (Russian E-Stim Treatment)  enhance muscle contraction  -RF     Treatment Details (Russian Electrical Stimulation Treatment)  15 mins, 10/20, 40 mA  -RF     Comment (Iranian E-Stim Treatment)  Pt cued for seated march with on cycle; activation noted, however no visible joint motion observed.   -RF     Row Name 09/09/21 1546          IRF PT Goals    Bed Mobility Goal Selection (PT-IRF)  bed mobility, PT goal 1  -RF     Transfer Goal Selection (PT-IRF)  transfers, PT goal 1  -RF     Gait (Walking Locomotion) Goal Selection (PT-IRF)  gait, PT goal 1  -RF     Stairs Goal Selection (PT-IRF)  stairs, PT goal 1  -RF     Row Name 09/09/21 1546          Bed Mobility Goal 1 (PT-IRF)    Activity/Assistive Device (Bed Mobility Goal 1, PT-IRF)  bed mobility activities, all  -RF     [place] Level (Bed Mobility Goal 1, PT-IRF)  standby assist  -RF     Time Frame (Bed Mobility Goal 1, PT-IRF)  long-term goal (LTG)  -RF     Progress/Outcomes (Bed Mobility Goal 1, PT-IRF)  goal ongoing  -RF     Row Name 09/09/21 1546          Transfer Goal 1 (PT-IRF)    Activity/Assistive Device (Transfer Goal 1, PT-IRF)  all transfers  -RF     [place] Level  (Transfer Goal 1, PT-IRF)  minimum assist (75% or more patient effort)  -RF     Time Frame (Transfer Goal 1, PT-IRF)  by discharge  -RF     Progress/Outcomes (Transfer Goal 1, PT-IRF)  goal ongoing  -RF     Row Name 09/09/21 1546          Gait/Walking Locomotion Goal 1 (PT-IRF)    Activity/Assistive Device (Gait/Walking Locomotion Goal 1, PT-IRF)  gait (walking locomotion);assistive device use  -RF     Gait/Walking Locomotion Distance Goal 1 (PT-IRF)  5  -RF     [place] Level (Gait/Walking Locomotion Goal 1, PT-IRF)  minimum assist (75% or more patient effort)  -RF     Time Frame (Gait/Walking Locomotion Goal 1, PT-IRF)  long-term goal (LTG)  -RF     Progress/Outcomes (Gait/Walking Locomotion Goal 1, PT-IRF)  goal ongoing  -RF     Row Name 09/09/21 1546          Stairs Goal 1 (PT-IRF)    Activity/Assistive Device (Stairs Goal 1, PT-IRF)  stairs, all skills;ascending stairs;descending stairs;using handrail, left  -RF     Number of Stairs (Stairs Goal 1, PT-IRF)  5  -RF     [place] Level (Stairs Goal 1, PT-IRF)  minimum assist (75% or more patient effort);moderate assist (50-74% patient effort)  -RF     Time Frame (Stairs Goal 1, PT-IRF)  long-term goal (LTG)  -RF     Progress/Outcomes (Stairs Goal 1, PT-IRF)  goal ongoing  -RF     Row Name 09/09/21 1546          Positioning and Restraints    Pre-Treatment Position  in bed  -RF     Post Treatment Position  bed  -RF     In Bed  supine;call light within reach;encouraged to call for assist  -RF     Row Name 09/09/21 1546          Therapy Assessment/Plan (PT)    Rehab Potential/Prognosis (PT)  adequate, monitor progress closely  -RF     Frequency of Treatment (PT)  5 times per week  -RF     Estimated Duration of Therapy (PT)  2 weeks  -RF     Problem List (PT)  problems related to;balance;hemiparesis/hemiplegia;mobility;strength  -RF     Row Name 09/09/21 1546          Daily Progress Summary (PT)    Functional Goal Overall Progress (PT)  progressing toward  functional goals as expected  -RF     Daily Progress Summary (PT)  Increased assistance required for functional mobility this date. Improvements noted in gait training. Continued skilled care required.   -RF     Impairments Still Limiting Function (PT)  ROM decreased;sensation decreased;strength decreased;impaired balance;impaired communication;impaired functional activity tolerance;motor control impaired;postural control impaired;sensory feedback impaired;pain  -RF     Recommendations (PT)  Continue per current POC  -RF       User Key  (r) = Recorded By, (t) = Taken By, (c) = Cosigned By    Initials Name Provider Type    Malinda Wang, PTA Physical Therapy Assistant           Physical Therapy Education                 Title: PT OT SLP Therapies (Done)     Topic: Physical Therapy (Done)     Point: Mobility training (Done)     Learning Progress Summary           Patient Acceptance, E,TB, VU by RF at 9/9/2021 1551    Acceptance, E,TB, VU by RF at 9/8/2021 1602    Acceptance, E,TB, VU by RF at 9/7/2021 1605    Acceptance, E,TB, VU by RF at 9/6/2021 1557    Acceptance, E,TB, VU by RF at 9/3/2021 1532    Acceptance, E,TB, VU by DG at 9/2/2021 2003    Acceptance, E,TB, VU by RF at 9/2/2021 1610    Acceptance, E,TB, VU by DG at 9/1/2021 2256    Acceptance, E,TB, VU by RF at 9/1/2021 1548    Acceptance, E,TB, VU by RF at 8/31/2021 1423    Acceptance, E,TB, VU by RF at 8/30/2021 1548                   Point: Home exercise program (Done)     Learning Progress Summary           Patient Acceptance, E,TB, VU by RF at 9/9/2021 1551    Acceptance, E,TB, VU by RF at 9/8/2021 1602    Acceptance, E,TB, VU by RF at 9/7/2021 1605    Acceptance, E,TB, VU by RF at 9/6/2021 1557    Acceptance, E,TB, VU by RF at 9/3/2021 1532    Acceptance, E,TB, VU by DG at 9/2/2021 2003    Acceptance, E,TB, VU by RF at 9/2/2021 1610    Acceptance, E,TB, VU by DG at 9/1/2021 2256    Acceptance, E,TB, VU by RF at 9/1/2021 1548    Acceptance, E,TB,  VU by RF at 8/31/2021 1423    Acceptance, E,TB, VU by RF at 8/30/2021 1548                   Point: Body mechanics (Done)     Learning Progress Summary           Patient Acceptance, E,TB, VU by RF at 9/9/2021 1551    Acceptance, E,TB, VU by RF at 9/8/2021 1602    Acceptance, E,TB, VU by RF at 9/7/2021 1605    Acceptance, E,TB, VU by RF at 9/6/2021 1557    Acceptance, E,TB, VU by RF at 9/3/2021 1532    Acceptance, E,TB, VU by DG at 9/2/2021 2003    Acceptance, E,TB, VU by RF at 9/2/2021 1610    Acceptance, E,TB, VU by DG at 9/1/2021 2256    Acceptance, E,TB, VU by RF at 9/1/2021 1548    Acceptance, E,TB, VU by RF at 8/31/2021 1423    Acceptance, E,TB, VU by RF at 8/30/2021 1548                   Point: Precautions (Done)     Learning Progress Summary           Patient Acceptance, E,TB, VU by RF at 9/9/2021 1551    Acceptance, E,TB, VU by RF at 9/8/2021 1602    Acceptance, E,TB, VU by RF at 9/7/2021 1605    Acceptance, E,TB, VU by RF at 9/6/2021 1557    Acceptance, E,TB, VU by RF at 9/3/2021 1532    Acceptance, E,TB, VU by DG at 9/2/2021 2003    Acceptance, E,TB, VU by RF at 9/2/2021 1610    Acceptance, E,TB, VU by DG at 9/1/2021 2256    Acceptance, E,TB, VU by RF at 9/1/2021 1548    Acceptance, E,TB, VU by RF at 8/31/2021 1423    Acceptance, E,TB, VU by RF at 8/30/2021 1548                               User Key     Initials Effective Dates Name Provider Type Discipline     06/16/21 -  Melissa Swift, RN Registered Nurse Nurse     06/16/21 -  Malinda Alicea, PTA Physical Therapy Assistant PT                PT Recommendation and Plan    Frequency of Treatment (PT): 5 times per week     Daily Progress Summary (PT)  Functional Goal Overall Progress (PT): progressing toward functional goals as expected  Daily Progress Summary (PT): Increased assistance required for functional mobility this date. Improvements noted in gait training. Continued skilled care required.   Impairments Still Limiting Function (PT): ROM  decreased, sensation decreased, strength decreased, impaired balance, impaired communication, impaired functional activity tolerance, motor control impaired, postural control impaired, sensory feedback impaired, pain  Recommendations (PT): Continue per current POC               Time Calculation:     PT Charges     Row Name 09/09/21 1551             Time Calculation    Start Time  1300  -RF      Stop Time  1430  -RF      Time Calculation (min)  90 min  -RF      PT Received On  09/09/21  -RF      PT - Next Appointment  09/10/21  -RF      PT Goal Re-Cert Due Date  09/10/21  -RF         Time Calculation- PT    Total Timed Code Minutes- PT  90 minute(s)  -RF        User Key  (r) = Recorded By, (t) = Taken By, (c) = Cosigned By    Initials Name Provider Type    RF Malinda Alicea, PTA Physical Therapy Assistant          Therapy Charges for Today     Code Description Service Date Service Provider Modifiers Qty    75799703692 HC GAIT TRAINING EA 15 MIN 9/8/2021 Malinda Alicea, PTA GP, KX 2    35964799248 HC PT NEUROMUSC RE EDUCATION EA 15 MIN 9/8/2021 Malinda Alicea, PTA GP, KX 1    75074768142 HC PT THER PROC EA 15 MIN 9/8/2021 Malinda Alicea, PTA GP, KX 2    24299342405 HC PT THERAPEUTIC ACT EA 15 MIN 9/8/2021 Malinda Alicea, PTA GP, KX 1    15003152604 HC GAIT TRAINING EA 15 MIN 9/9/2021 Malinda Alicea, PTA GP, KX 2    89709906580 HC PT THER PROC EA 15 MIN 9/9/2021 Malinda Alicea, PTA GP, KX 2    59403594414 HC PT NEUROMUSC RE EDUCATION EA 15 MIN 9/9/2021 Malinda Alicea, PTA GP, KX 1    10474007139 HC PT THERAPEUTIC ACT EA 15 MIN 9/9/2021 Malinda Alicea, PTA GP, KX 1                   Malnidacasey Alicea, PTA  9/9/2021

## 2021-09-09 NOTE — PROGRESS NOTES
Occupational Therapy: Individual: 90 minutes.    Physical Therapy:    Speech Language Pathology:    Signed by: iTff Monge, Occupational Therapist

## 2021-09-10 ENCOUNTER — APPOINTMENT (OUTPATIENT)
Dept: GENERAL RADIOLOGY | Facility: HOSPITAL | Age: 81
End: 2021-09-10

## 2021-09-10 LAB
GLUCOSE BLDC GLUCOMTR-MCNC: 171 MG/DL (ref 70–130)
GLUCOSE BLDC GLUCOMTR-MCNC: 225 MG/DL (ref 70–130)
GLUCOSE BLDC GLUCOMTR-MCNC: 228 MG/DL (ref 70–130)
GLUCOSE BLDC GLUCOMTR-MCNC: 310 MG/DL (ref 70–130)

## 2021-09-10 PROCEDURE — 97032 APPL MODALITY 1+ESTIM EA 15: CPT | Performed by: OCCUPATIONAL THERAPIST

## 2021-09-10 PROCEDURE — 94799 UNLISTED PULMONARY SVC/PX: CPT

## 2021-09-10 PROCEDURE — 97112 NEUROMUSCULAR REEDUCATION: CPT

## 2021-09-10 PROCEDURE — 63710000001 INSULIN DETEMIR PER 5 UNITS: Performed by: INTERNAL MEDICINE

## 2021-09-10 PROCEDURE — 92507 TX SP LANG VOICE COMM INDIV: CPT

## 2021-09-10 PROCEDURE — 74230 X-RAY XM SWLNG FUNCJ C+: CPT

## 2021-09-10 PROCEDURE — 97116 GAIT TRAINING THERAPY: CPT

## 2021-09-10 PROCEDURE — 63710000001 INSULIN ASPART PER 5 UNITS: Performed by: INTERNAL MEDICINE

## 2021-09-10 PROCEDURE — 92611 MOTION FLUOROSCOPY/SWALLOW: CPT

## 2021-09-10 PROCEDURE — 97535 SELF CARE MNGMENT TRAINING: CPT | Performed by: OCCUPATIONAL THERAPIST

## 2021-09-10 PROCEDURE — 99231 SBSQ HOSP IP/OBS SF/LOW 25: CPT | Performed by: FAMILY MEDICINE

## 2021-09-10 PROCEDURE — 97110 THERAPEUTIC EXERCISES: CPT

## 2021-09-10 PROCEDURE — 74230 X-RAY XM SWLNG FUNCJ C+: CPT | Performed by: RADIOLOGY

## 2021-09-10 PROCEDURE — 82962 GLUCOSE BLOOD TEST: CPT

## 2021-09-10 PROCEDURE — 97530 THERAPEUTIC ACTIVITIES: CPT

## 2021-09-10 PROCEDURE — 97112 NEUROMUSCULAR REEDUCATION: CPT | Performed by: OCCUPATIONAL THERAPIST

## 2021-09-10 RX ADMIN — CHLORTHALIDONE 25 MG: 50 TABLET ORAL at 08:36

## 2021-09-10 RX ADMIN — DOCUSATE SODIUM 50 MG AND SENNOSIDES 8.6 MG 1 TABLET: 8.6; 5 TABLET, FILM COATED ORAL at 08:36

## 2021-09-10 RX ADMIN — OXYCODONE HYDROCHLORIDE AND ACETAMINOPHEN 500 MG: 500 TABLET ORAL at 08:36

## 2021-09-10 RX ADMIN — INSULIN DETEMIR 20 UNITS: 100 INJECTION, SOLUTION SUBCUTANEOUS at 22:30

## 2021-09-10 RX ADMIN — LEVOTHYROXINE SODIUM 50 MCG: 50 TABLET ORAL at 05:27

## 2021-09-10 RX ADMIN — CARVEDILOL 12.5 MG: 6.25 TABLET, FILM COATED ORAL at 18:15

## 2021-09-10 RX ADMIN — INSULIN ASPART 3 UNITS: 100 INJECTION, SOLUTION INTRAVENOUS; SUBCUTANEOUS at 17:04

## 2021-09-10 RX ADMIN — CARVEDILOL 12.5 MG: 6.25 TABLET, FILM COATED ORAL at 08:36

## 2021-09-10 RX ADMIN — Medication 1000 MCG: at 08:36

## 2021-09-10 RX ADMIN — AMLODIPINE BESYLATE 10 MG: 10 TABLET ORAL at 08:36

## 2021-09-10 RX ADMIN — KETOTIFEN FUMARATE 1 DROP: 0.35 SOLUTION/ DROPS OPHTHALMIC at 08:35

## 2021-09-10 RX ADMIN — Medication 10 MG: at 21:19

## 2021-09-10 RX ADMIN — PANTOPRAZOLE SODIUM 40 MG: 40 TABLET, DELAYED RELEASE ORAL at 05:27

## 2021-09-10 RX ADMIN — INSULIN ASPART 2 UNITS: 100 INJECTION, SOLUTION INTRAVENOUS; SUBCUTANEOUS at 08:37

## 2021-09-10 RX ADMIN — SERTRALINE 50 MG: 50 TABLET, FILM COATED ORAL at 08:36

## 2021-09-10 RX ADMIN — DOCUSATE SODIUM 50 MG AND SENNOSIDES 8.6 MG 1 TABLET: 8.6; 5 TABLET, FILM COATED ORAL at 21:19

## 2021-09-10 RX ADMIN — APIXABAN 5 MG: 5 TABLET, FILM COATED ORAL at 08:36

## 2021-09-10 RX ADMIN — Medication 1 TABLET: at 08:36

## 2021-09-10 RX ADMIN — KETOTIFEN FUMARATE 1 DROP: 0.35 SOLUTION/ DROPS OPHTHALMIC at 15:23

## 2021-09-10 RX ADMIN — LINAGLIPTIN 5 MG: 5 TABLET, FILM COATED ORAL at 08:36

## 2021-09-10 RX ADMIN — KETOTIFEN FUMARATE 1 DROP: 0.35 SOLUTION/ DROPS OPHTHALMIC at 21:19

## 2021-09-10 RX ADMIN — INSULIN DETEMIR 20 UNITS: 100 INJECTION, SOLUTION SUBCUTANEOUS at 08:12

## 2021-09-10 RX ADMIN — APIXABAN 5 MG: 5 TABLET, FILM COATED ORAL at 21:18

## 2021-09-10 RX ADMIN — ATORVASTATIN CALCIUM 80 MG: 40 TABLET, FILM COATED ORAL at 21:18

## 2021-09-10 RX ADMIN — INSULIN ASPART 5 UNITS: 100 INJECTION, SOLUTION INTRAVENOUS; SUBCUTANEOUS at 11:49

## 2021-09-10 NOTE — PLAN OF CARE
Goal Outcome Evaluation:   Pt progressing medically and physically in all therapies.  Continue POC.

## 2021-09-10 NOTE — THERAPY TREATMENT NOTE
Inpatient Rehabilitation - Physical Therapy Treatment Note       [facility]     Patient Name: Joycelyn Brown  : 1940  MRN: [id number]    Today's Date: 9/10/2021                    Admit Date: 2021      Visit Dx:     ICD-10-CM ICD-9-CM   1. CVA (cerebral vascular accident) (CMS/[facility])  I63.9 434.91       Patient Active Problem List   Diagnosis   • CVA (cerebral vascular accident) (CMS/[facility])       History reviewed. No pertinent past medical history.    No past surgical history on file.       PT ASSESSMENT (last 12 hours)      IRF PT Evaluation and Treatment     Row Name 09/10/21 1550          PT Time and Intention    Document Type  daily treatment  -RF     Mode of Treatment  physical therapy  -RF     Patient/Family/Caregiver Comments/Observations  No significant c/o noted this date.   -RF     Row Name 09/10/21 4039          General Information    Patient Profile Reviewed  yes  -RF     Row Name 09/10/21 1557          Cognition/Psychosocial    Affect/Mental Status (Cognitive)  WFL  -RF     Orientation Status (Cognition)  oriented to;place;time  -RF     Follows Commands (Cognition)  WFL;follows one-step commands;over 90% accuracy  -RF     Row Name 09/10/21 0636          Bed Mobility    Bed Mobility  supine-sit;rolling left;rolling right;sit-supine  -RF     Supine-Sit [place] (Bed Mobility)  maximum assist (25% patient effort)  -RF     Sit-Supine [place] (Bed Mobility)  maximum assist (25% patient effort)  -RF     Assistive Device (Bed Mobility)  bed rails  -     Row Name 09/10/21 8770          Transfer Assessment/Treatment    Transfers  sit-stand transfer;stand-sit transfer;stand pivot/stand step transfer;bed-chair transfer  -RF     Row Name 09/10/21 8615          Transfers    Bed-Chair [place] (Transfers)  2 person assist;maximum assist (25% patient effort)  -RF     Chair-Bed [place] (Transfers)  2 person assist;maximum assist (25% patient effort)  -RF     Assistive Device (Bed-Chair  Transfers)  wheelchair  -RF     Sit-Stand [place] (Transfers)  2 person assist;moderate assist (50% patient effort)  -RF     Stand-Sit [place] (Transfers)  2 person assist;other (see comments);moderate assist (50% patient effort) mod in parallel bars  -RF     Row Name 09/10/21 1550          Chair-Bed Transfer    Assistive Device (Chair-Bed Transfers)  wheelchair  -RF     Row Name 09/10/21 1550          Sit-Stand Transfer    Assistive Device (Sit-Stand Transfers)  wheelchair;other (see comments) Min/Mod in parallel bars  -RF     Row Name 09/10/21 1550          Stand-Sit Transfer    Assistive Device (Stand-Sit Transfers)  wheelchair;other (see comments) parallel bars  -RF     Row Name 09/10/21 1550          Gait/Stairs (Locomotion)    [place] Level (Gait)  2 person assist;moderate assist (50% patient effort)  -RF     Assistive Device (Gait)  other (see comments) parallel bars  -RF     Distance in Feet (Gait)  6'X5  -RF     Pattern (Gait)  step-to  -RF     Bilateral Gait Deviations  forward flexed posture;weight shift ability decreased  -RF     Right Sided Gait Deviations  knee buckling, right side;weight shift ability decreased  -RF     Comment (Gait/Stairs)  Pt ambulated in parallel bars while using HW. Pt cued for w/s and upright posture. Improved pattern noted.   -     Row Name 09/10/21 1550          Hip (Therapeutic Exercise)    Hip Strengthening (Therapeutic Exercise)  bilateral;flexion;extension;aBduction;aDduction;external rotation;internal rotation;heel slides;supine;2 sets;10 repetitions  -     Row Name 09/10/21 1550          Knee (Therapeutic Exercise)    Knee Strengthening (Therapeutic Exercise)  bilateral;flexion;extension;heel slides;SLR (straight leg raise);SAQ (short arc quad);hamstring curls;supine;2 sets;10 repetitions  -RF     Row Name 09/10/21 1550          Ankle (Therapeutic Exercise)    Ankle Strengthening (Therapeutic Exercise)  bilateral;dorsiflexion;plantarflexion;supine;2  sets;10 repetitions  -RF     Row Name 09/10/21 1550          Modality Intervention (Comprehensive)    Modality Treatment  Slovenian stimulation  -RF     Row Name 09/10/21 1550          IRF PT Goals    Bed Mobility Goal Selection (PT-IRF)  bed mobility, PT goal 1  -RF     Transfer Goal Selection (PT-IRF)  transfers, PT goal 1  -RF     Gait (Walking Locomotion) Goal Selection (PT-IRF)  gait, PT goal 1  -RF     Stairs Goal Selection (PT-IRF)  stairs, PT goal 1  -RF     Row Name 09/10/21 1550          Bed Mobility Goal 1 (PT-IRF)    Activity/Assistive Device (Bed Mobility Goal 1, PT-IRF)  bed mobility activities, all  -RF     [place] Level (Bed Mobility Goal 1, PT-IRF)  standby assist  -RF     Time Frame (Bed Mobility Goal 1, PT-IRF)  long-term goal (LTG)  -RF     Progress/Outcomes (Bed Mobility Goal 1, PT-IRF)  goal ongoing  -RF     Row Name 09/10/21 1550          Transfer Goal 1 (PT-IRF)    Activity/Assistive Device (Transfer Goal 1, PT-IRF)  all transfers  -RF     [place] Level (Transfer Goal 1, PT-IRF)  minimum assist (75% or more patient effort)  -RF     Time Frame (Transfer Goal 1, PT-IRF)  by discharge  -RF     Progress/Outcomes (Transfer Goal 1, PT-IRF)  goal ongoing  -RF     Row Name 09/10/21 1550          Gait/Walking Locomotion Goal 1 (PT-IRF)    Activity/Assistive Device (Gait/Walking Locomotion Goal 1, PT-IRF)  gait (walking locomotion);assistive device use  -RF     Gait/Walking Locomotion Distance Goal 1 (PT-IRF)  5  -RF     [place] Level (Gait/Walking Locomotion Goal 1, PT-IRF)  minimum assist (75% or more patient effort)  -RF     Time Frame (Gait/Walking Locomotion Goal 1, PT-IRF)  long-term goal (LTG)  -RF     Progress/Outcomes (Gait/Walking Locomotion Goal 1, PT-IRF)  goal ongoing  -RF     Row Name 09/10/21 1550          Stairs Goal 1 (PT-IRF)    Activity/Assistive Device (Stairs Goal 1, PT-IRF)  stairs, all skills;ascending stairs;descending stairs;using handrail, left  -RF     Number of  Stairs (Stairs Goal 1, PT-IRF)  5  -RF     [place] Level (Stairs Goal 1, PT-IRF)  minimum assist (75% or more patient effort);moderate assist (50-74% patient effort)  -RF     Time Frame (Stairs Goal 1, PT-IRF)  long-term goal (LTG)  -RF     Progress/Outcomes (Stairs Goal 1, PT-IRF)  goal ongoing  -RF     Row Name 09/10/21 1550          Positioning and Restraints    Pre-Treatment Position  sitting in chair/recliner BID  -RF     In Bed  supine;call light within reach;encouraged to call for assist PM  -RF     In Wheelchair  sitting;call light within reach;encouraged to call for assist AM  -RF     Row Name 09/10/21 1550          Therapy Assessment/Plan (PT)    Rehab Potential/Prognosis (PT)  adequate, monitor progress closely  -RF     Frequency of Treatment (PT)  5 times per week  -RF     Estimated Duration of Therapy (PT)  2 weeks  -RF     Problem List (PT)  problems related to;balance;hemiparesis/hemiplegia;mobility;strength  -RF     Row Name 09/10/21 5455          Daily Progress Summary (PT)    Functional Goal Overall Progress (PT)  progressing toward functional goals as expected  -RF     Daily Progress Summary (PT)  Pt demonstrates improved ambualtion quality this date. COnitnued skilled care required for further improvements.   -RF     Impairments Still Limiting Function (PT)  ROM decreased;sensation decreased;strength decreased;impaired balance;impaired communication;impaired functional activity tolerance;motor control impaired;postural control impaired;sensory feedback impaired;pain  -RF     Recommendations (PT)  Continue per current POC.   -RF       User Key  (r) = Recorded By, (t) = Taken By, (c) = Cosigned By    Initials Name Provider Type    RF Malinda Alicea PTA Physical Therapy Assistant           Physical Therapy Education                 Title: PT OT SLP Therapies (Done)     Topic: Physical Therapy (Done)     Point: Mobility training (Done)     Learning Progress Summary           Patient  Acceptance, E,TB, VU by RF at 9/10/2021 1556    Acceptance, E,TB, VU by RF at 9/9/2021 1551    Acceptance, E,TB, VU by RF at 9/8/2021 1602    Acceptance, E,TB, VU by RF at 9/7/2021 1605    Acceptance, E,TB, VU by RF at 9/6/2021 1557    Acceptance, E,TB, VU by RF at 9/3/2021 1532    Acceptance, E,TB, VU by DG at 9/2/2021 2003    Acceptance, E,TB, VU by RF at 9/2/2021 1610    Acceptance, E,TB, VU by DG at 9/1/2021 2256    Acceptance, E,TB, VU by RF at 9/1/2021 1548    Acceptance, E,TB, VU by RF at 8/31/2021 1423    Acceptance, E,TB, VU by RF at 8/30/2021 1548                   Point: Home exercise program (Done)     Learning Progress Summary           Patient Acceptance, E,TB, VU by RF at 9/10/2021 1556    Acceptance, E,TB, VU by RF at 9/9/2021 1551    Acceptance, E,TB, VU by RF at 9/8/2021 1602    Acceptance, E,TB, VU by RF at 9/7/2021 1605    Acceptance, E,TB, VU by RF at 9/6/2021 1557    Acceptance, E,TB, VU by RF at 9/3/2021 1532    Acceptance, E,TB, VU by DG at 9/2/2021 2003    Acceptance, E,TB, VU by RF at 9/2/2021 1610    Acceptance, E,TB, VU by DG at 9/1/2021 2256    Acceptance, E,TB, VU by RF at 9/1/2021 1548    Acceptance, E,TB, VU by RF at 8/31/2021 1423    Acceptance, E,TB, VU by RF at 8/30/2021 1548                   Point: Body mechanics (Done)     Learning Progress Summary           Patient Acceptance, E,TB, VU by RF at 9/10/2021 1556    Acceptance, E,TB, VU by RF at 9/9/2021 1551    Acceptance, E,TB, VU by RF at 9/8/2021 1602    Acceptance, E,TB, VU by RF at 9/7/2021 1605    Acceptance, E,TB, VU by RF at 9/6/2021 1557    Acceptance, E,TB, VU by RF at 9/3/2021 1532    Acceptance, E,TB, VU by DG at 9/2/2021 2003    Acceptance, E,TB, VU by RF at 9/2/2021 1610    Acceptance, E,TB, VU by DG at 9/1/2021 2256    Acceptance, E,TB, VU by RF at 9/1/2021 1548    Acceptance, E,TB, VU by RF at 8/31/2021 1423    Acceptance, E,TB, VU by RF at 8/30/2021 1548                   Point: Precautions (Done)     Learning  Progress Summary           Patient Acceptance, E,TB, VU by RF at 9/10/2021 1556    Acceptance, E,TB, VU by RF at 9/9/2021 1551    Acceptance, E,TB, VU by RF at 9/8/2021 1602    Acceptance, E,TB, VU by RF at 9/7/2021 1605    Acceptance, E,TB, VU by RF at 9/6/2021 1557    Acceptance, E,TB, VU by RF at 9/3/2021 1532    Acceptance, E,TB, VU by DG at 9/2/2021 2003    Acceptance, E,TB, VU by RF at 9/2/2021 1610    Acceptance, E,TB, VU by DG at 9/1/2021 2256    Acceptance, E,TB, VU by RF at 9/1/2021 1548    Acceptance, E,TB, VU by RF at 8/31/2021 1423    Acceptance, E,TB, VU by RF at 8/30/2021 1548                               User Key     Initials Effective Dates Name Provider Type Discipline     06/16/21 -  Melissa Swift RN Registered Nurse Nurse     06/16/21 -  Malinda Alicea PTA Physical Therapy Assistant PT                PT Recommendation and Plan    Frequency of Treatment (PT): 5 times per week     Daily Progress Summary (PT)  Functional Goal Overall Progress (PT): progressing toward functional goals as expected  Daily Progress Summary (PT): Pt demonstrates improved ambualtion quality this date. COnitnued skilled care required for further improvements.   Impairments Still Limiting Function (PT): ROM decreased, sensation decreased, strength decreased, impaired balance, impaired communication, impaired functional activity tolerance, motor control impaired, postural control impaired, sensory feedback impaired, pain  Recommendations (PT): Continue per current POC.                Time Calculation:     PT Charges     Row Name 09/10/21 1557 09/10/21 1556          Time Calculation    Start Time  1245  -RF  1045  -RF     Stop Time  1330  -RF  1130  -RF     Time Calculation (min)  45 min  -RF  45 min  -RF     PT Received On  09/10/21  -RF  09/10/21  -RF     PT - Next Appointment  09/13/21  -RF  09/10/21  -RF     PT Goal Re-Cert Due Date  09/13/21  -RF  09/13/21  -RF        Time Calculation- PT    Total Timed Code  Minutes- PT  45 minute(s)  -RF  45 minute(s)  -RF       User Key  (r) = Recorded By, (t) = Taken By, (c) = Cosigned By    Initials Name Provider Type    RF Malinda Alicea, CROW Physical Therapy Assistant          Therapy Charges for Today     Code Description Service Date Service Provider Modifiers Qty    09472260082  GAIT TRAINING EA 15 MIN 9/9/2021 Malinda Alicea, PTA GP, KX 2    92503412663 HC PT THER PROC EA 15 MIN 9/9/2021 Malinda Alicea, PTA GP, KX 2    18665276962 HC PT NEUROMUSC RE EDUCATION EA 15 MIN 9/9/2021 Malinda Alicea, PTA GP, KX 1    14914989799 HC PT THERAPEUTIC ACT EA 15 MIN 9/9/2021 Malinda Alicea, PTA GP, KX 1    23942623213 HC GAIT TRAINING EA 15 MIN 9/10/2021 Malinda Alicea, PTA GP, KX 2    03069615053 HC PT NEUROMUSC RE EDUCATION EA 15 MIN 9/10/2021 Malinda Alicea, PTA GP, KX 1    87306712526  PT THER PROC EA 15 MIN 9/10/2021 Malinda Alicea, PTA GP, KX 2    20298843661 HC PT THERAPEUTIC ACT EA 15 MIN 9/10/2021 Malinda Alicea, PTA GP, KX 1                   Malindacasey Alicea, PTA  9/10/2021

## 2021-09-10 NOTE — THERAPY TREATMENT NOTE
Inpatient Rehabilitation - Speech Language Pathology   Swallow Treatment Note [facility] [place]     Patient Name: Joycelyn Brown  : 1940  MRN: [id number]  Today's Date: 9/10/2021               Admit Date: 2021    Visit Dx:     ICD-10-CM ICD-9-CM   1. CVA (cerebral vascular accident) (CMS/[facility])  I63.9 434.91     Patient Active Problem List   Diagnosis   • CVA (cerebral vascular accident) (CMS/[facility])     History reviewed. No pertinent past medical history.  No past surgical history on file.    Dysphagia Tx Note:      Ms. Brown is seen in pt room of inpatient physical rehabilitation this pm for formal dysphagia tx.  She is cooperative to participate on this date.   She is noted w/ improved endurance on this date.  She is s/p MBS this am w/ recommendations to advance po diet to thin liquids.       Long Term Goal:  Pt will accept least restrictive diet tolerance w/o overt s/s aspiration.      Short Term Goals:  1. Pt will tolerate facial massage to R  Facial surface for 3-7 minutes to increase surface blood flow, sensation and ROM over 3 consecutive sessions.   *PM: R mechanical facial massage for 7 minutes w/ mildly increased surface blood flow.     2. Pt will perform OROM/MONAE exercises x3 sets x10 reps w/ min cues.  *PM: x1 sets x10 reps w/ min cues     3. Pt will perform resistive breathing exercises x3 sets x5 reps w/resistance of 2-5  To improve respiratory support and control.   *GOAL MET    4. Pt will perform laryngeal adduction/elevation exercises x3 sets x10 reps w/ min cues.   *GOAL MET    5. Pt will perform Shaker exercises sustained x3 sets x5 reps for 30+ seconds over 3 consecutive sessions.   *GOAL MET    6. Pt will perform Shaker exercises repetitive x3 sets x12 reps w/ mod cues.   *GOAL MET      7. Pt will perform compensatory techniques during meals w/ min cues.  *PM: Pt reports no difficulty w/ midday meal      8. Pt will participate in instrumental re-evaluation of swallowing fnx in 7-10 days,  pending progress towards this poc.  *GOAL MET    Thank you-  Maryam Jones M.S., SERENITY-SLP      -------------------------------------------------------------------------------------------------------      DYSARTHRIA THERAPY PLAN OF CARE:     Ms. Brown is seen in pt room of inpatient physical rehabilitation this pm for formal dysarthria tx.  She is cooperative to participate on this date.        Long Term Goal:  Pt will improve motor speech to allow for maximal communication and safety in adls.      Short Term Goals:  1. Pt will tolerate facial massage to R dacial surface for 3-7 minutes to increase surface blood flow, sensation and ROM over 3 consecutive sessions.   *PM: R mechanical facial massage for 7 Minutes w/ mildly increased surface blood flow.     2. Pt will perform OROM/MONAE exercises x3 sets x10 reps w/ min cues.  *x3 sets x10 reps w/ mod cues.   *PM: x1 sets x10 reps w/ min cues     3. Pt will perform resistive breathing exercises x3 sets x5 reps w/resistance of 2-5 to improve respiratory support and control.   *GOAL MET    4. Pt will accurately produce multi-syllabic words 4/5 opp w/ min cues over 3 consecutive sessions.   *PM: 4/5 opp w/ min cues    5. Pt will overarticulate and hyperphonate in connected speech 4/5 opp w/ min cues over 3 consecutive sessions.   *PM: 4/5 opp w/ min cues in general conversation     6. Pt will improve intelligibility to 100% for both known/unknown context in conversation w/ min cues over 3 consecutive sessions.   *PM: approx 95% intelligible w/ unknown context and min cues for decreased rate and overarticulation.      *Goals to be added/modified as necessary.      Thank you-  Maryam Jones M.S., CCC-SLP        SLP Recommendation and Plan  Continue modified po diet and tx per poc.     EDUCATION  The patient has been educated in the following areas:   Dysphagia (Swallowing Impairment) Modified Diet Instruction.     Time Calculation:   Time Calculation- SLP     Row Name 09/10/21  1459 09/10/21 1118          Time Calculation- SLP    SLP Start Time  1330  -JR  0920  -JR     SLP Stop Time  1355  -JR  1000  -JR     SLP Time Calculation (min)  25 min  -JR  40 min  -JR     SLP - Next Appointment  09/13/21  -JR  09/13/21  -       User Key  (r) = Recorded By, (t) = Taken By, (c) = Cosigned By    Initials Name Provider Type    Maryam Adams MS, CFY-SLP Speech and Language Pathologist        Therapy Charges for Today     Code Description Service Date Service Provider Modifiers Qty    76948402668 HC ST TREATMENT SPEECH 1 9/9/2021 Maryam Jones MS, CFY-SLP GN, KX 1    79785534242 HC ST TREATMENT SWALLOW 2 9/9/2021 Maryam Jones MS, CFY-SLP GN, KX 1    08416555207 HC ST TREATMENT SPEECH 1 9/9/2021 Maryam Jones MS, CFY-SLP GN, KX 1    01227102413 HC ST TREATMENT SWALLOW 2 9/9/2021 Maryam Jones MS, CFY-SLP GN, KX 1    22322896489 HC ST MOTION FLUORO EVAL SWALLOW 3 9/10/2021 Maryam Jones, MS, CFY-SLP GN, KX 1    09587114172 HC ST TREATMENT SPEECH 2 9/10/2021 Maryam Jones, MS, CFY-SLP GN, KX 1        Patient was not wearing a face mask during this therapy encounter. Therapist used appropriate personal protective equipment including mask, eye protection and gloves.  Mask used was standard procedure mask. Appropriate PPE was worn during the entire therapy session. The patient did not cough during this evaluation. Therapist was within 6 feet for 15 minutes or more during the evaluation. Hand hygiene was completed before and after therapy session. Patient is not in enhanced droplet precautions.     Maryam Jones MS, LOVE-SLP  9/10/2021

## 2021-09-10 NOTE — THERAPY TREATMENT NOTE
Inpatient Rehabilitation - Occupational Therapy Treatment Note     [facility]     Patient Name: Joycelyn Brown  : 1940  MRN: [id number]    Today's Date: 9/10/2021                 Admit Date: 2021         ICD-10-CM ICD-9-CM   1. CVA (cerebral vascular accident) (CMS/[facility])  I63.9 434.91       Patient Active Problem List   Diagnosis   • CVA (cerebral vascular accident) (CMS/[facility])       History reviewed. No pertinent past medical history.    No past surgical history on file.             IRF OT ASSESSMENT FLOWSHEET (last 12 hours)      IRF OT Evaluation and Treatment     Row Name 09/10/21 1400          OT Time and Intention    Document Type  daily treatment  -     Mode of Treatment  individual therapy;occupational therapy  -     Patient Effort  good  -     Row Name 09/10/21 1400          General Information    Patient/Family/Caregiver Comments/Observations  patient agreeable to therapy. patient tolerated well today  -     Existing Precautions/Restrictions  fall;oxygen therapy device and L/min  -     Row Name 09/10/21 1400          Transfers    Bed-Chair [place] (Transfers)  moderate assist (50% patient effort)  -     Row Name 09/10/21 1400          Motor Skills    Motor Skills  functional endurance;neuro-muscular function  -     Therapeutic Exercise  -- RUE PROM, AAROM, grasp release, neuro  -     Row Name 09/10/21 1400          Modality Intervention (Comprehensive)    Modality Treatment  NMES/FES/[place] RIGHT wrist digit ext  -       User Key  (r) = Recorded By, (t) = Taken By, (c) = Cosigned By    Initials Name Effective Dates     Maria Guadalupe Monge, OT 21 -            Occupational Therapy Education                 Title: PT OT SLP Therapies (Done)     Topic: Occupational Therapy (Done)     Point: ADL training (Done)     Description:   Instruct learner(s) on proper safety adaptation and remediation techniques during self care or transfers.   Instruct in proper use of assistive  devices.              Learning Progress Summary           Patient Acceptance, E,TB, VU by  at 9/2/2021 2003    Acceptance, E,D, VU,NR by  at 9/2/2021 1458    Acceptance, E,TB, VU by  at 9/1/2021 2256    Acceptance, E,D, VU,NR by  at 9/1/2021 1520    Acceptance, E,D, VU,NR by  at 8/31/2021 1541    Acceptance, E, VU,NR by  at 8/30/2021 1507    Acceptance, E,TB, VU by  at 8/28/2021 2040    Acceptance, E,D, VU,NR by AB at 8/28/2021 1410                   Point: Home exercise program (Done)     Description:   Instruct learner(s) on appropriate technique for monitoring, assisting and/or progressing therapeutic exercises/activities.              Learning Progress Summary           Patient Acceptance, E,TB, VU by  at 9/2/2021 2003    Acceptance, E,TB, VU by  at 9/1/2021 2256    Acceptance, E,TB, VU by  at 8/28/2021 2040                   Point: Precautions (Done)     Description:   Instruct learner(s) on prescribed precautions during self-care and functional transfers.              Learning Progress Summary           Patient Acceptance, E,TB, VU by  at 9/2/2021 2003    Acceptance, E,D, VU,NR by  at 9/2/2021 1458    Acceptance, E,TB, VU by  at 9/1/2021 2256    Acceptance, E,D, VU,NR by  at 9/1/2021 1520    Acceptance, E,D, VU,NR by  at 8/31/2021 1541    Acceptance, E, VU,NR by  at 8/30/2021 1507    Acceptance, E,TB, VU by  at 8/28/2021 2040    Acceptance, E,D, VU,NR by AB at 8/28/2021 1410                               User Key     Initials Effective Dates Name Provider Type Discipline    AB 06/16/21 -  Yazmin Chaves, OT Occupational Therapist OT     06/16/21 -  Melissa Swift, RN Registered Nurse Nurse     06/16/21 -  Debbie Mehta, OT Occupational Therapist OT     06/16/21 -  Lucrecia Andrews, OT Occupational Therapist OT                    OT Recommendation and Plan                         Time Calculation:     Time Calculation- OT     Row Name 09/10/21 1442              Time Calculation- OT    OT Start Time  0745  -      OT Stop Time  0915  -      OT Time Calculation (min)  90 min  -        User Key  (r) = Recorded By, (t) = Taken By, (c) = Cosigned By    Initials Name Provider Type     Maria Guadalupe Monge, OT Occupational Therapist        Therapy Charges for Today     Code Description Service Date Service Provider Modifiers Qty    65954953607 HC OT SELF CARE/MGMT/TRAIN EA 15 MIN 9/9/2021 Maria Guadalupe Monge, OT GO, KX 2    57810643163 HC OT NEUROMUSC RE EDUCATION EA 15 MIN 9/9/2021 Maria Guadalupe Monge, OT GO, KX 3    91583134723 HC OT ELEC STIM EA-PER 15 MIN 9/9/2021 Maria Guadalupe Monge, OT GO, KX 1    82937564965 HC OT SELF CARE/MGMT/TRAIN EA 15 MIN 9/10/2021 Maria Guadalupe Monge, OT GO, KX 2    78522128981 HC OT NEUROMUSC RE EDUCATION EA 15 MIN 9/10/2021 Maria Guadalupe Monge, OT GO, KX 3    26342690114 HC OT ELEC STIM EA-PER 15 MIN 9/10/2021 Maria Guadalupe Monge, OT GO, KX 1                   Maria Guadalupe Monge, OT  9/10/2021

## 2021-09-10 NOTE — MBS/VFSS/FEES
Inpatient Rehabilitation - Speech Language Pathology   Swallow Re-Evaluation [facility] [place]   MODIFIED BARIUM SWALLOW STUDY     Patient Name: Joycelyn Brown  : 1940  MRN: [id number]  Today's Date: 9/10/2021           Admit Date: 2021      Joycelyn Bronw  presents to the radiology suite this am from 110/1S to participate in an instrumental MBS to assess safety/efficacy of swallowing fnx, determine safest/least restrictive diet.  She was admitted on 21 w/ CVA.  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  Pt reports she has been at [facility] for above mentioned acute admission.  Pt reports participation in instrumental swallow evaluation during acute admission.      She is s/p most recent MBSS on 9/3/21 w/ recommendations to continue nectar thick liquids.  She has been participating in formal dysphagia tx to address noted dysphagia.         Social History     Socioeconomic History   • Marital status:      Spouse name: Not on file   • Number of children: Not on file   • Years of education: Not on file   • Highest education level: Not on file   Tobacco Use   • Smoking status: Never Smoker   • Smokeless tobacco: Never Used        No recent chest xray available for review.     Diet Orders (active) (From admission, onward)     Start     Ordered    09/10/21 1003  Diet Soft Texture; Whole Foods; Thin  Diet Effective Now      09/10/21 1002    21 05  DIET MESSAGE PLEASE SEND DOWN SOME APPLESAUCE TO ADMINISTER MEDICATIONS WITH; THANK YOU  Once     Comments: PLEASE SEND DOWN SOME APPLESAUCE TO ADMINISTER MEDICATIONS WITH; THANK YOU    21 [id number]                Pt is observed on nasal cannula at 2L w/o  complications.     Risks and benefits of the procedure are explained w/ verbalizing understanding/agreement to participate.     Pt is positioned upright and centered in a soft strap supportive chair to accept multiple po presentations of solid cracker, puree, honey thick, nectar thick, and thin liquids via spoon, cup and straw, along w/ whole placebo pill in puree. Pt is able to self feed and does so for cup and straw presentations across this evaluation.     All views are from the lateral plane.     Facial/oral structures are slightly asymmetrical upon observation w/ noted R facial weakness w/o lingual deviation upon protrusion. Oral mucosa are moist, pink and clean. Secretions are clear, thin and well controlled. OROM/MONAE is generally weak w noted R weakness to imitate oral postures. Gag is not assessed. Volitional cough is adequate in intensity, clear in quality, nonproductive. Vocal quality is adequate in intensity, clear in quality w/ intelligible speech. Pt is a/a and cooperative to particpate.  Pt is oriented to person, place, and time, follows simple directives, and participates in simple conversational exchanges.     Upon po presentations, adequate bolus anticipation w/ slightly weak R labial seal for bolus clearance via spoon bowl, cup rim stability and suction via straw.  Bolus formation, manipulation, and control are slightly prolonged w/ rotary mastication pattern. A-p transit is timely w/o oral residue. Tongue base retraction and linguavelar seal are slightly weak w/ intermittent premature spillage to the valleculae w/ thin liquid presentations. No laryngeal penetration or aspiration is evidenced before the swallow.     Pharyngeal swallow is slightly delayed w/ slightly weak hyolaryngeal elevation and epiglottic inversion. Transient penetration of thin liquids via initial cup presentation only, clearing completely upon completion of the swallow.  Pt is evidenced w/ spontaneous throat clear.  Pharyngeal  contraction is adequate w/o significant residue. No laryngeal penetration or aspiration evidenced during or after the swallow.     Partial esophageal sweep reveals no mucosal abnormalities. Motility is wfl w/o retrograde flow noted.          Visit Dx:     ICD-10-CM ICD-9-CM   1. CVA (cerebral vascular accident) (CMS/[facility])  I63.9 434.91     Patient Active Problem List   Diagnosis   • CVA (cerebral vascular accident) (CMS/[facility])     History reviewed. No pertinent past medical history.  No past surgical history on file.    IMPRESSION:  Pt presents w/ mild oral weakness w/ wfl oropharyngeal swallow w/ isolated event of transient penetration of thin liquids via initial cup presentation only.  No other penetration or aspiration evidenced across this evaluation.      She is felt to most benefit from continued dysphagia therapy to address noted oral weakness and decreased ROM/MONAE.  She is felt to benefit from an upgraded po diet of mechanical soft solids, whole meats, thin liquids.      SLP Recommendation and Plan       Recommendations:   1. Mechanical soft solids, whole meats, thin liquids  2. Meds whole in puree/thins.   3. Mehran precautions.   4. Oral care protocol.   5. Universal aspiration precautions.   6. Participation in formal dysphagia tx to address oral weakness    SLP to f/u.     D/w pt results and recommendations w/ verbal understanding and agreement.     D/w RN results and recommendations w/ verbal understanding and agreement.     Thank you for allowing me to participate in the care of your patient-  Maryam Jones M.S., CCC-SLP            EDUCATION  The patient has been educated in the following areas:   Dysphagia (Swallowing Impairment).              Time Calculation:   Time Calculation- SLP     Row Name 09/10/21 1118             Time Calculation- SLP    SLP Start Time  0920  -JR      SLP Stop Time  1000  -      SLP Time Calculation (min)  40 min  -      SLP - Next Appointment  09/13/21  -        User Key  (r)  = Recorded By, (t) = Taken By, (c) = Cosigned By    Initials Name Provider Type    Maryam Adams MS, CFY-SLP Speech and Language Pathologist          Therapy Charges for Today     Code Description Service Date Service Provider Modifiers Qty    25064375277 HC ST TREATMENT SPEECH 1 9/9/2021 Maryam Jones MS, CFY-SLP GN, KX 1    52227702352 HC ST TREATMENT SWALLOW 2 9/9/2021 Maryam Jones MS, CFY-SLP GN, KX 1    04130059793 HC ST TREATMENT SPEECH 1 9/9/2021 Maryam Jones MS, CFY-SLP GN, KX 1    12697982955 HC ST TREATMENT SWALLOW 2 9/9/2021 Maryam Jones MS, CFY-SLP GN, KX 1    16956600811 HC ST MOTION FLUORO EVAL SWALLOW 3 9/10/2021 Maryam Jones MS, CFY-SLP GN, KX 1               Maryam Jones MS, CFY-SLP  9/10/2021

## 2021-09-10 NOTE — PROGRESS NOTES
Rehabilitation Nursing  Inpatient Rehabilitation Plan of Care Note    Plan of Care  Pain    Pain Management (Active)  Current Status (8/27/2021 10:38:00 AM): potential for pain  Weekly Goal: No pain this week  Discharge Goal: No pain    Safety    Potential for Injury (Active)  Current Status (8/27/2021 10:38:00 AM): At risk for injury  Weekly Goal: No injury this week  Discharge Goal: No injuries    Signed by: Maylin Jauregui, Supervisor

## 2021-09-10 NOTE — PROGRESS NOTES
Occupational Therapy:    Physical Therapy:    Speech Language Pathology: Individual: 65 minutes.    Signed by: MEET Chen

## 2021-09-10 NOTE — PROGRESS NOTES
[facility]  PROGRESS NOTE     Patient Identification:  Name:  Joycelyn Brown  Age:  81 y.o.  Sex:  female  :  1940  MRN:  [id number]  Visit Number:  [id number]  ROOM: 110/1S     Primary Care Provider:  Donya Looney APRN    Length of stay in inpatient status:  14    Subjective     Chief Compliant:  No chief complaint on file.      History of Presenting Illness: 81-year-old with with history of left MCA pontine CVA--patient has a fairly dense right-sided hemiparesis.  Patient is getting some increased movement in the right upper extremity according to OT this morning.  Patient seems to be improving with her swallowing apparatus as well.  No new complaints otherwise    Objective     Current Hospital Meds:amLODIPine, 10 mg, Oral, BID  apixaban, 5 mg, Oral, Q12H  ascorbic acid, 500 mg, Oral, Daily  atorvastatin, 80 mg, Oral, Nightly  carvedilol, 12.5 mg, Oral, BID With Meals  chlorthalidone, 25 mg, Oral, Daily  insulin aspart, 0-7 Units, Subcutaneous, TID AC  insulin detemir, 20 Units, Subcutaneous, Q12H  ketotifen, 1 drop, Both Eyes, TID  levothyroxine, 50 mcg, Oral, Q AM  linagliptin, 5 mg, Oral, Daily  melatonin, 10 mg, Oral, Nightly  multivitamin with minerals, 1 tablet, Oral, Daily  pantoprazole, 40 mg, Oral, Q AM  senna-docusate sodium, 1 tablet, Oral, BID  sertraline, 50 mg, Oral, Daily  vitamin B-12, 1,000 mcg, Oral, Daily       ----------------------------------------------------------------------------------------------------------------------  Vital Signs:  Temp:  [97.8 °F (36.6 °C)] 97.8 °F (36.6 °C)  Heart Rate:  [60] 60  Resp:  [18] 18  BP: (152)/(87) 152/87  SpO2:  [97 %-98 %] 97 %  on  Flow (L/min):  [2-3] 2;   Device (Oxygen Therapy): nasal cannula  Body mass index is 28.19 kg/m².    Wt Readings from Last 3 Encounters:   21 81.6 kg (180 lb)   02/10/15 81.7 kg (180 lb 0.1 oz)     Intake & Output (last 3 days)       701 -  07 -  07  09/09 0701 - 09/10 0700 09/10 0701 - 09/11 0700    P.O. 840 1040 1010 120    Total Intake(mL/kg) 840 (10.3) 1040 (12.7) 1010 (12.4) 120 (1.5)    Net +840 +1040 +1010 +120            Urine Unmeasured Occurrence 7 x 5 x 5 x 1 x    Stool Unmeasured Occurrence 1 x 1 x 3 x 1 x        Diet Soft Texture; Whole Foods; Thin  ----------------------------------------------------------------------------------------------------------------------  Physical exam:  Constitutional:   Elderly female no distress  HEENT: Normocephalic atraumatic  Neck: Supple   Cardiovascular: Regular rate and rhythm  Pulmonary/Chest: Clear to auscultation  Abdominal:  .  Positive bowel sounds soft  Musculoskeletal: No arthropathy  Neurological: 1-2 out of 5 strength in the right upper and lower extremity.  Skin: No rash  Peripheral vascular:  Genitourinary:  ----------------------------------------------------------------------------------------------------------------------    Last echocardiogram:    ----------------------------------------------------------------------------------------------------------------------  Results from last 7 days   Lab Units 09/06/21  0111   WBC 10*3/mm3 11.69*   HEMOGLOBIN g/dL 13.1   HEMATOCRIT % 40.6   MCV fL 89.0   MCHC g/dL 32.3   PLATELETS 10*3/mm3 288         Results from last 7 days   Lab Units 09/06/21  0111   SODIUM mmol/L 138   POTASSIUM mmol/L 4.0   CHLORIDE mmol/L 99   CO2 mmol/L 30.3*   BUN mg/dL 29*   CREATININE mg/dL 1.13*   EGFR IF NONAFRICN AM mL/min/1.73 46*   CALCIUM mg/dL 9.8   GLUCOSE mg/dL 212*   ALBUMIN g/dL 3.22*   BILIRUBIN mg/dL 0.3   ALK PHOS U/L 98   AST (SGOT) U/L 17   ALT (SGPT) U/L 15   Estimated Creatinine Clearance: 43.6 mL/min (A) (by C-G formula based on SCr of 1.13 mg/dL (H)).  No results found for: AMMONIA              Glucose   Date/Time Value Ref Range Status   09/10/2021 1112 310 (H) 70 - 130 mg/dL Final     Comment:     Meter: [id number] : [id number] preet bocanegra    09/10/2021 0643 171 (H) 70 - 130 mg/dL Final     Comment:     Meter: [id number] : [id number] Miko Crystal   09/09/2021 2019 260 (H) 70 - 130 mg/dL Final     Comment:     Meter: [id number] : [id number] Sebastian MCMULLEN   09/09/2021 1606 354 (H) 70 - 130 mg/dL Final     Comment:     Meter: [id number] : [id number] monhollin rebel   09/09/2021 1057 330 (H) 70 - 130 mg/dL Final     Comment:     Meter: [id number] : [id number] monhollin rebel   09/09/2021 0640 146 (H) 70 - 130 mg/dL Final     Comment:     Meter: [id number] : [id number] Sebastian MCMULLEN   09/08/2021 2031 320 (H) 70 - 130 mg/dL Final     Comment:     Meter: [id number] : [id number] Sebastian MCMULLEN   09/08/2021 1636 366 (H) 70 - 130 mg/dL Final     Comment:     Meter: [id number] : [id number] preet bocanegra     No results found for: TSH, FREET4  No results found for: PREGTESTUR, PREGSERUM, HCG, HCGQUANT  Pain Management Panel    There is no flowsheet data to display.       Brief Urine Lab Results     None        No results found for: BLOODCX      No results found for: URINECX  No results found for: WOUNDCX  No results found for: STOOLCX        I have personally looked at the labs and they are summarized above.  ----------------------------------------------------------------------------------------------------------------------  Detailed radiology reports for the last 24 hours:    Imaging Results (Last 24 Hours)     Procedure Component Value Units Date/Time    FL Video Swallow Single Contrast [[id number]] Collected: 09/10/21 1018     Updated: 09/10/21 1030    Narrative:      FL VIDEO SWALLOW SINGLE-CONTRAST-     CLINICAL INDICATION: aspiration r/o; I63.9-Cerebral infarction,  unspecified        TECHNIQUE:   Speech therapy service was present. The patient was examined in the  sitting lateral position and was given several consistencies of barium.     FINDINGS: Isolated episode of penetration with thin liquids but  no  aspiration.     FLUOROSCOPY TIME: 0.6 minutes.     Images: Cine loop was acquired       Impression:      No evidence of aspiration.     For additional information please see the report provided by the speech  therapy service     This report was finalized on 9/10/2021 10:27 AM by Dr. Reg Morales MD.           Final impressions for the last 30 days of radiology reports:    FL Video Swallow Single Contrast    Result Date: 9/10/2021  No evidence of aspiration.  For additional information please see the report provided by the speech therapy service  This report was finalized on 9/10/2021 10:27 AM by Dr. Reg Morales MD.      FL Video Swallow Single Contrast    Result Date: 9/3/2021      Please see the speech pathologist's report for additional information.  This report was finalized on 9/3/2021 12:31 PM by Dr. Anthony Gresham MD.      FL Video Swallow Single Contrast    Result Date: 8/28/2021  1.  Penetration with aspiration of thin liquids. 2. Please see dysphasia notes for further details.  This report was finalized on 8/28/2021 11:49 AM by Dr. Obie Fleming MD.      I have personally looked at the radiology images and read the final radiology report.    Assessment & Plan    Status post left MCA pontine CVA--patient has been transitioned over to thin liquids and a soft diet today per speech therapy.  Patient is on Eliquis and statin therapy at this time.  Patient requiring moderate to maximum assistance for help with transfers; ambulated 6 feet x 5 with the parallel bars and 2 person moderate assistance.  Patient continues to dissipate in motor strengthening exercises both fine motor coordination and gross motor coordination activities.  Patient also doing balance improvement activities and currently has a moderate's dynamic standing balance deficit.  Patient requiring minimum assist to moderate assistance for upper body dressing; maximum assistance for bathing; maximum assistance for lower body dressing; minimum  assistance for grooming; maximum assistance for toileting.  We will continue to work with patient to improve her skills in these activities.    Constipation--continue MiraLAX and Senokot    Hypertension--fair control.  Continue Norvasc, Coreg, chlorthalidone    Depression--patient seems to be improved somewhat today.  We will continue Zoloft 50 mg daily    Hypothyroidism continue Synthroid    VTE Prophylaxis:   Mechanical Order History:      Ordered        08/27/21 1125  Maintain Sequential Compression Device  Continuous         08/27/21 1125  Place Sequential Compression Device  Once                 Pharmalogical Order History:      Ordered     Dose Route Frequency Stop    08/27/21 1125  apixaban (ELIQUIS) tablet 5 mg      5 mg PO Every 12 Hours Scheduled --                    Garrett Sneed MD  [facility]  09/10/21  12:47 EDT

## 2021-09-11 LAB
ANION GAP SERPL CALCULATED.3IONS-SCNC: 10.2 MMOL/L (ref 5–15)
BASOPHILS # BLD AUTO: 0.1 10*3/MM3 (ref 0–0.2)
BASOPHILS NFR BLD AUTO: 1 % (ref 0–1.5)
BUN SERPL-MCNC: 30 MG/DL (ref 8–23)
BUN/CREAT SERPL: 25.4 (ref 7–25)
CALCIUM SPEC-SCNC: 9.4 MG/DL (ref 8.6–10.5)
CHLORIDE SERPL-SCNC: 98 MMOL/L (ref 98–107)
CO2 SERPL-SCNC: 30.8 MMOL/L (ref 22–29)
CREAT SERPL-MCNC: 1.18 MG/DL (ref 0.57–1)
DEPRECATED RDW RBC AUTO: 45.7 FL (ref 37–54)
EOSINOPHIL # BLD AUTO: 0.47 10*3/MM3 (ref 0–0.4)
EOSINOPHIL NFR BLD AUTO: 4.5 % (ref 0.3–6.2)
ERYTHROCYTE [DISTWIDTH] IN BLOOD BY AUTOMATED COUNT: 14.5 % (ref 12.3–15.4)
GFR SERPL CREATININE-BSD FRML MDRD: 44 ML/MIN/1.73
GLUCOSE BLDC GLUCOMTR-MCNC: 167 MG/DL (ref 70–130)
GLUCOSE BLDC GLUCOMTR-MCNC: 199 MG/DL (ref 70–130)
GLUCOSE BLDC GLUCOMTR-MCNC: 241 MG/DL (ref 70–130)
GLUCOSE BLDC GLUCOMTR-MCNC: 285 MG/DL (ref 70–130)
GLUCOSE SERPL-MCNC: 190 MG/DL (ref 65–99)
HCT VFR BLD AUTO: 39.2 % (ref 34–46.6)
HGB BLD-MCNC: 13.1 G/DL (ref 12–15.9)
IMM GRANULOCYTES # BLD AUTO: 0.04 10*3/MM3 (ref 0–0.05)
IMM GRANULOCYTES NFR BLD AUTO: 0.4 % (ref 0–0.5)
LYMPHOCYTES # BLD AUTO: 2.69 10*3/MM3 (ref 0.7–3.1)
LYMPHOCYTES NFR BLD AUTO: 26 % (ref 19.6–45.3)
MCH RBC QN AUTO: 30.8 PG (ref 26.6–33)
MCHC RBC AUTO-ENTMCNC: 33.4 G/DL (ref 31.5–35.7)
MCV RBC AUTO: 92.2 FL (ref 79–97)
MONOCYTES # BLD AUTO: 0.88 10*3/MM3 (ref 0.1–0.9)
MONOCYTES NFR BLD AUTO: 8.5 % (ref 5–12)
NEUTROPHILS NFR BLD AUTO: 59.6 % (ref 42.7–76)
NEUTROPHILS NFR BLD AUTO: 6.16 10*3/MM3 (ref 1.7–7)
NRBC BLD AUTO-RTO: 0 /100 WBC (ref 0–0.2)
PLATELET # BLD AUTO: 311 10*3/MM3 (ref 140–450)
PMV BLD AUTO: 11 FL (ref 6–12)
POTASSIUM SERPL-SCNC: 3.7 MMOL/L (ref 3.5–5.2)
RBC # BLD AUTO: 4.25 10*6/MM3 (ref 3.77–5.28)
SODIUM SERPL-SCNC: 139 MMOL/L (ref 136–145)
WBC # BLD AUTO: 10.34 10*3/MM3 (ref 3.4–10.8)

## 2021-09-11 PROCEDURE — 94799 UNLISTED PULMONARY SVC/PX: CPT

## 2021-09-11 PROCEDURE — 63710000001 INSULIN ASPART PER 5 UNITS: Performed by: INTERNAL MEDICINE

## 2021-09-11 PROCEDURE — 80048 BASIC METABOLIC PNL TOTAL CA: CPT | Performed by: FAMILY MEDICINE

## 2021-09-11 PROCEDURE — 63710000001 INSULIN DETEMIR PER 5 UNITS: Performed by: INTERNAL MEDICINE

## 2021-09-11 PROCEDURE — 85025 COMPLETE CBC W/AUTO DIFF WBC: CPT | Performed by: FAMILY MEDICINE

## 2021-09-11 PROCEDURE — 82962 GLUCOSE BLOOD TEST: CPT

## 2021-09-11 RX ADMIN — INSULIN DETEMIR 20 UNITS: 100 INJECTION, SOLUTION SUBCUTANEOUS at 22:01

## 2021-09-11 RX ADMIN — KETOTIFEN FUMARATE 1 DROP: 0.35 SOLUTION/ DROPS OPHTHALMIC at 21:02

## 2021-09-11 RX ADMIN — Medication 10 MG: at 21:03

## 2021-09-11 RX ADMIN — APIXABAN 5 MG: 5 TABLET, FILM COATED ORAL at 21:03

## 2021-09-11 RX ADMIN — LEVOTHYROXINE SODIUM 50 MCG: 50 TABLET ORAL at 05:19

## 2021-09-11 RX ADMIN — INSULIN ASPART 4 UNITS: 100 INJECTION, SOLUTION INTRAVENOUS; SUBCUTANEOUS at 12:16

## 2021-09-11 RX ADMIN — INSULIN DETEMIR 20 UNITS: 100 INJECTION, SOLUTION SUBCUTANEOUS at 09:04

## 2021-09-11 RX ADMIN — DOCUSATE SODIUM 50 MG AND SENNOSIDES 8.6 MG 1 TABLET: 8.6; 5 TABLET, FILM COATED ORAL at 21:03

## 2021-09-11 RX ADMIN — INSULIN ASPART 2 UNITS: 100 INJECTION, SOLUTION INTRAVENOUS; SUBCUTANEOUS at 17:26

## 2021-09-11 RX ADMIN — LINAGLIPTIN 5 MG: 5 TABLET, FILM COATED ORAL at 08:41

## 2021-09-11 RX ADMIN — PANTOPRAZOLE SODIUM 40 MG: 40 TABLET, DELAYED RELEASE ORAL at 05:19

## 2021-09-11 RX ADMIN — KETOTIFEN FUMARATE 1 DROP: 0.35 SOLUTION/ DROPS OPHTHALMIC at 08:41

## 2021-09-11 RX ADMIN — SERTRALINE 50 MG: 50 TABLET, FILM COATED ORAL at 08:41

## 2021-09-11 RX ADMIN — APIXABAN 5 MG: 5 TABLET, FILM COATED ORAL at 08:41

## 2021-09-11 RX ADMIN — OXYCODONE HYDROCHLORIDE AND ACETAMINOPHEN 500 MG: 500 TABLET ORAL at 08:41

## 2021-09-11 RX ADMIN — CHLORTHALIDONE 25 MG: 50 TABLET ORAL at 08:42

## 2021-09-11 RX ADMIN — KETOTIFEN FUMARATE 1 DROP: 0.35 SOLUTION/ DROPS OPHTHALMIC at 16:40

## 2021-09-11 RX ADMIN — DOCUSATE SODIUM 50 MG AND SENNOSIDES 8.6 MG 1 TABLET: 8.6; 5 TABLET, FILM COATED ORAL at 08:41

## 2021-09-11 RX ADMIN — INSULIN ASPART 2 UNITS: 100 INJECTION, SOLUTION INTRAVENOUS; SUBCUTANEOUS at 08:42

## 2021-09-11 RX ADMIN — Medication 1000 MCG: at 08:41

## 2021-09-11 RX ADMIN — ATORVASTATIN CALCIUM 80 MG: 40 TABLET, FILM COATED ORAL at 21:03

## 2021-09-11 RX ADMIN — Medication 1 TABLET: at 08:41

## 2021-09-11 RX ADMIN — AMLODIPINE BESYLATE 10 MG: 10 TABLET ORAL at 21:03

## 2021-09-12 LAB
GLUCOSE BLDC GLUCOMTR-MCNC: 140 MG/DL (ref 70–130)
GLUCOSE BLDC GLUCOMTR-MCNC: 230 MG/DL (ref 70–130)
GLUCOSE BLDC GLUCOMTR-MCNC: 246 MG/DL (ref 70–130)
GLUCOSE BLDC GLUCOMTR-MCNC: 254 MG/DL (ref 70–130)

## 2021-09-12 PROCEDURE — 63710000001 ONDANSETRON PER 8 MG: Performed by: INTERNAL MEDICINE

## 2021-09-12 PROCEDURE — 63710000001 INSULIN DETEMIR PER 5 UNITS: Performed by: INTERNAL MEDICINE

## 2021-09-12 PROCEDURE — 94799 UNLISTED PULMONARY SVC/PX: CPT

## 2021-09-12 PROCEDURE — 63710000001 INSULIN ASPART PER 5 UNITS: Performed by: INTERNAL MEDICINE

## 2021-09-12 PROCEDURE — 82962 GLUCOSE BLOOD TEST: CPT

## 2021-09-12 PROCEDURE — 99231 SBSQ HOSP IP/OBS SF/LOW 25: CPT | Performed by: FAMILY MEDICINE

## 2021-09-12 RX ADMIN — INSULIN ASPART 3 UNITS: 100 INJECTION, SOLUTION INTRAVENOUS; SUBCUTANEOUS at 17:56

## 2021-09-12 RX ADMIN — ATORVASTATIN CALCIUM 80 MG: 40 TABLET, FILM COATED ORAL at 21:14

## 2021-09-12 RX ADMIN — KETOTIFEN FUMARATE 1 DROP: 0.35 SOLUTION/ DROPS OPHTHALMIC at 09:17

## 2021-09-12 RX ADMIN — KETOTIFEN FUMARATE 1 DROP: 0.35 SOLUTION/ DROPS OPHTHALMIC at 17:57

## 2021-09-12 RX ADMIN — OXYCODONE HYDROCHLORIDE AND ACETAMINOPHEN 500 MG: 500 TABLET ORAL at 11:46

## 2021-09-12 RX ADMIN — APIXABAN 5 MG: 5 TABLET, FILM COATED ORAL at 21:14

## 2021-09-12 RX ADMIN — Medication 1000 MCG: at 09:14

## 2021-09-12 RX ADMIN — DOCUSATE SODIUM 50 MG AND SENNOSIDES 8.6 MG 1 TABLET: 8.6; 5 TABLET, FILM COATED ORAL at 21:15

## 2021-09-12 RX ADMIN — PANTOPRAZOLE SODIUM 40 MG: 40 TABLET, DELAYED RELEASE ORAL at 05:48

## 2021-09-12 RX ADMIN — KETOTIFEN FUMARATE 1 DROP: 0.35 SOLUTION/ DROPS OPHTHALMIC at 21:14

## 2021-09-12 RX ADMIN — ONDANSETRON HYDROCHLORIDE 4 MG: 4 TABLET, FILM COATED ORAL at 11:40

## 2021-09-12 RX ADMIN — Medication 1 TABLET: at 09:14

## 2021-09-12 RX ADMIN — DOCUSATE SODIUM 50 MG AND SENNOSIDES 8.6 MG 1 TABLET: 8.6; 5 TABLET, FILM COATED ORAL at 09:14

## 2021-09-12 RX ADMIN — CARVEDILOL 12.5 MG: 6.25 TABLET, FILM COATED ORAL at 17:56

## 2021-09-12 RX ADMIN — CHLORTHALIDONE 25 MG: 50 TABLET ORAL at 09:14

## 2021-09-12 RX ADMIN — SERTRALINE 50 MG: 50 TABLET, FILM COATED ORAL at 09:14

## 2021-09-12 RX ADMIN — INSULIN ASPART 3 UNITS: 100 INJECTION, SOLUTION INTRAVENOUS; SUBCUTANEOUS at 11:40

## 2021-09-12 RX ADMIN — Medication 10 MG: at 21:15

## 2021-09-12 RX ADMIN — APIXABAN 5 MG: 5 TABLET, FILM COATED ORAL at 09:14

## 2021-09-12 RX ADMIN — INSULIN DETEMIR 20 UNITS: 100 INJECTION, SOLUTION SUBCUTANEOUS at 21:15

## 2021-09-12 RX ADMIN — LINAGLIPTIN 5 MG: 5 TABLET, FILM COATED ORAL at 09:14

## 2021-09-12 RX ADMIN — AMLODIPINE BESYLATE 10 MG: 10 TABLET ORAL at 21:14

## 2021-09-12 RX ADMIN — LEVOTHYROXINE SODIUM 50 MCG: 50 TABLET ORAL at 05:48

## 2021-09-12 RX ADMIN — INSULIN DETEMIR 20 UNITS: 100 INJECTION, SOLUTION SUBCUTANEOUS at 09:20

## 2021-09-12 NOTE — PROGRESS NOTES
Rehabilitation Nursing  Inpatient Rehabilitation Plan of Care Note    Plan of Care  Copy from POC    Pain    Pain Management (Active)  Current Status (8/27/2021 10:38:00 AM): potential for pain  Weekly Goal: No pain this week  Discharge Goal: No pain    Safety    Potential for Injury (Active)  Current Status (8/27/2021 10:38:00 AM): At risk for injury  Weekly Goal: No injury this week  Discharge Goal: No injuries    RN Interventions    Pain -  RN: Assess pain Q shft and PRN..Call bell in reach..Meds per MD order [RN]    Safety -  RN: Assess safety Q 1 hour and PRN..Call bell in reach..Meds per MD order [RN]    Signed by: Nurse Mai

## 2021-09-12 NOTE — PROGRESS NOTES
[facility]  PROGRESS NOTE     Patient Identification:  Name:  Joycelyn Brown  Age:  81 y.o.  Sex:  female  :  1940  MRN:  [id number]  Visit Number:  [id number]  ROOM: 110/1S     Primary Care Provider:  Donya Looney APRN    Length of stay in inpatient status:  16    Subjective     Chief Compliant:  No chief complaint on file.      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  Patient has been making some progress throughout the week.  Course she is rested to the weekend.  Has no new complaints at this time    Objective     Current Hospital Meds:amLODIPine, 10 mg, Oral, BID  apixaban, 5 mg, Oral, Q12H  ascorbic acid, 500 mg, Oral, Daily  atorvastatin, 80 mg, Oral, Nightly  carvedilol, 12.5 mg, Oral, BID With Meals  chlorthalidone, 25 mg, Oral, Daily  insulin aspart, 0-7 Units, Subcutaneous, TID AC  insulin detemir, 20 Units, Subcutaneous, Q12H  ketotifen, 1 drop, Both Eyes, TID  levothyroxine, 50 mcg, Oral, Q AM  linagliptin, 5 mg, Oral, Daily  melatonin, 10 mg, Oral, Nightly  multivitamin with minerals, 1 tablet, Oral, Daily  pantoprazole, 40 mg, Oral, Q AM  senna-docusate sodium, 1 tablet, Oral, BID  sertraline, 50 mg, Oral, Daily  vitamin B-12, 1,000 mcg, Oral, Daily       ----------------------------------------------------------------------------------------------------------------------  Vital Signs:  Temp:  [97.9 °F (36.6 °C)] 97.9 °F (36.6 °C)  Heart Rate:  [63-68] 63  Resp:  [20] 20  BP: (115-152)/(57-64) 115/64  SpO2:  [94 %-95 %] 95 %  on  Flow (L/min):  [3] 3;   Device (Oxygen Therapy): nasal cannula  Body mass index is 28.19 kg/m².    Wt Readings from Last 3 Encounters:   21 81.6 kg (180 lb)   02/10/15 81.7 kg (180 lb 0.1 oz)     Intake & Output (last 3 days)       701 - 09/10 0700 09/10 0701 -  -  07 -  0700    P.O. 1010 700 920      Total Intake(mL/kg) 1010 (12.4) 700 (8.6) 920 (11.3)     Net +1010 +700 +920             Urine Unmeasured Occurrence 5 x 6 x 7 x     Stool Unmeasured Occurrence 3 x 2 x 2 x         Diet Soft Texture; Whole Foods; Thin  ----------------------------------------------------------------------------------------------------------------------  Physical exam:  Constitutional:   81-year-old female in no distress  HEENT: Normocephalic atraumatic  Neck: Supple   Cardiovascular: Regular rate and rhythm  Pulmonary/Chest: Slightly coarse but fair air movement  Abdominal: Positive bowel sounds soft.   Musculoskeletal: No arthropathy  Neurological: Right-sided hemiparesis  Skin: No rash  Peripheral vascular:  Genitourinary:  ----------------------------------------------------------------------------------------------------------------------    Last echocardiogram:    ----------------------------------------------------------------------------------------------------------------------  Results from last 7 days   Lab Units 09/11/21  0315 09/06/21  0111   WBC 10*3/mm3 10.34 11.69*   HEMOGLOBIN g/dL 13.1 13.1   HEMATOCRIT % 39.2 40.6   MCV fL 92.2 89.0   MCHC g/dL 33.4 32.3   PLATELETS 10*3/mm3 311 288         Results from last 7 days   Lab Units 09/11/21  0315 09/06/21  0111   SODIUM mmol/L 139 138   POTASSIUM mmol/L 3.7 4.0   CHLORIDE mmol/L 98 99   CO2 mmol/L 30.8* 30.3*   BUN mg/dL 30* 29*   CREATININE mg/dL 1.18* 1.13*   EGFR IF NONAFRICN AM mL/min/1.73 44* 46*   CALCIUM mg/dL 9.4 9.8   GLUCOSE mg/dL 190* 212*   ALBUMIN g/dL  --  3.22*   BILIRUBIN mg/dL  --  0.3   ALK PHOS U/L  --  98   AST (SGOT) U/L  --  17   ALT (SGPT) U/L  --  15   Estimated Creatinine Clearance: 41.8 mL/min (A) (by C-G formula based on SCr of 1.18 mg/dL (H)).  No results found for: AMMONIA              Glucose   Date/Time Value Ref Range Status   09/12/2021 1113 230 (H) 70 - 130 mg/dL Final     Comment:     Meter: [id number] : [id number] GREG VERMA    09/12/2021 0557 140 (H) 70 - 130 mg/dL Final     Comment:     Meter: [id number] : [id number] Sebastian MCMULLEN   09/11/2021 2009 241 (H) 70 - 130 mg/dL Final     Comment:     Meter: [id number] : [id number] Sebastian MCMULLEN   09/11/2021 1618 199 (H) 70 - 130 mg/dL Final     Comment:     Meter: [id number] : [id number] preet bocanegra   09/11/2021 1102 285 (H) 70 - 130 mg/dL Final     Comment:     RN Notified Meter: [id number] : [id number] LUCI MEIERER   09/11/2021 0645 167 (H) 70 - 130 mg/dL Final     Comment:     Meter: [id number] : [id number] Sebastian MCMULLEN   09/10/2021 2003 225 (H) 70 - 130 mg/dL Final     Comment:     Meter: [id number] : [id number] URIEL CHANEL   09/10/2021 1643 228 (H) 70 - 130 mg/dL Final     Comment:     RN Notified Meter: [id number] : [id number] LUCI RITTER     No results found for: TSH, FREET4  No results found for: PREGTESTUR, PREGSERUM, HCG, HCGQUANT  Pain Management Panel    There is no flowsheet data to display.       Brief Urine Lab Results     None        No results found for: BLOODCX      No results found for: URINECX  No results found for: WOUNDCX  No results found for: STOOLCX        I have personally looked at the labs and they are summarized above.  ----------------------------------------------------------------------------------------------------------------------  Detailed radiology reports for the last 24 hours:    Imaging Results (Last 24 Hours)     ** No results found for the last 24 hours. **        Final impressions for the last 30 days of radiology reports:    FL Video Swallow Single Contrast    Result Date: 9/10/2021  No evidence of aspiration.  For additional information please see the report provided by the speech therapy service  This report was finalized on 9/10/2021 10:27 AM by Dr. Reg Morales MD.      FL Video Swallow Single Contrast    Result Date: 9/3/2021      Please see the speech pathologist's report for additional  information.  This report was finalized on 9/3/2021 12:31 PM by Dr. Anthony Gresham MD.      FL Video Swallow Single Contrast    Result Date: 8/28/2021  1.  Penetration with aspiration of thin liquids. 2. Please see dysphasia notes for further details.  This report was finalized on 8/28/2021 11:49 AM by Dr. Obie Fleming MD.      I have personally looked at the radiology images and read the final radiology report.    Assessment & Plan    Status post left MCA pontine CVA--patient currently on thin liquids and a soft diet.  We will continue Eliquis, statin therapy.  Last week patient was requiring maximum assistance for for bed mobility; 2 person maximum assistance for up with bed to chair and chair to bed independence.  To person moderate assistance for sit to stand and stand to sit independence.  Ambulated 6 feet x 5 on the parallel bars with 2 person moderate assistance.  Patient continues to get strengthening exercises performed as well as working on fine motor exercises.  Patient continues to be followed by speech therapy to improve her swallowing apparatus.    Constipation--continue MiraLAX and Senokot    Hypertension--well controlled.  Continue Norvasc, Coreg, chlorthalidone    Depression had increased Zoloft to 50 mg a day this week    Hypothyroidism continue Synthroid        VTE Prophylaxis:   Mechanical Order History:      Ordered        08/27/21 1125  Maintain Sequential Compression Device  Continuous         08/27/21 1125  Place Sequential Compression Device  Once                 Pharmalogical Order History:      Ordered     Dose Route Frequency Stop    08/27/21 1125  apixaban (ELIQUIS) tablet 5 mg      5 mg PO Every 12 Hours Scheduled --                    Garrett Sneed MD  [facility]  09/12/21  11:37 EDT

## 2021-09-13 LAB
GLUCOSE BLDC GLUCOMTR-MCNC: 195 MG/DL (ref 70–130)
GLUCOSE BLDC GLUCOMTR-MCNC: 200 MG/DL (ref 70–130)
GLUCOSE BLDC GLUCOMTR-MCNC: 287 MG/DL (ref 70–130)
GLUCOSE BLDC GLUCOMTR-MCNC: 392 MG/DL (ref 70–130)

## 2021-09-13 PROCEDURE — 63710000001 INSULIN DETEMIR PER 5 UNITS: Performed by: INTERNAL MEDICINE

## 2021-09-13 PROCEDURE — 82962 GLUCOSE BLOOD TEST: CPT

## 2021-09-13 PROCEDURE — 92507 TX SP LANG VOICE COMM INDIV: CPT

## 2021-09-13 PROCEDURE — 97530 THERAPEUTIC ACTIVITIES: CPT

## 2021-09-13 PROCEDURE — 97110 THERAPEUTIC EXERCISES: CPT

## 2021-09-13 PROCEDURE — 92526 ORAL FUNCTION THERAPY: CPT

## 2021-09-13 PROCEDURE — 97535 SELF CARE MNGMENT TRAINING: CPT | Performed by: OCCUPATIONAL THERAPIST

## 2021-09-13 PROCEDURE — 94799 UNLISTED PULMONARY SVC/PX: CPT

## 2021-09-13 PROCEDURE — 63710000001 INSULIN ASPART PER 5 UNITS: Performed by: INTERNAL MEDICINE

## 2021-09-13 PROCEDURE — 97110 THERAPEUTIC EXERCISES: CPT | Performed by: OCCUPATIONAL THERAPIST

## 2021-09-13 PROCEDURE — 97112 NEUROMUSCULAR REEDUCATION: CPT

## 2021-09-13 PROCEDURE — 97112 NEUROMUSCULAR REEDUCATION: CPT | Performed by: OCCUPATIONAL THERAPIST

## 2021-09-13 RX ADMIN — INSULIN ASPART 6 UNITS: 100 INJECTION, SOLUTION INTRAVENOUS; SUBCUTANEOUS at 12:40

## 2021-09-13 RX ADMIN — KETOTIFEN FUMARATE 1 DROP: 0.35 SOLUTION/ DROPS OPHTHALMIC at 08:20

## 2021-09-13 RX ADMIN — OXYCODONE HYDROCHLORIDE AND ACETAMINOPHEN 500 MG: 500 TABLET ORAL at 08:19

## 2021-09-13 RX ADMIN — DOCUSATE SODIUM 50 MG AND SENNOSIDES 8.6 MG 1 TABLET: 8.6; 5 TABLET, FILM COATED ORAL at 21:33

## 2021-09-13 RX ADMIN — INSULIN DETEMIR 20 UNITS: 100 INJECTION, SOLUTION SUBCUTANEOUS at 21:33

## 2021-09-13 RX ADMIN — ATORVASTATIN CALCIUM 80 MG: 40 TABLET, FILM COATED ORAL at 21:33

## 2021-09-13 RX ADMIN — KETOTIFEN FUMARATE 1 DROP: 0.35 SOLUTION/ DROPS OPHTHALMIC at 17:26

## 2021-09-13 RX ADMIN — INSULIN ASPART 2 UNITS: 100 INJECTION, SOLUTION INTRAVENOUS; SUBCUTANEOUS at 08:20

## 2021-09-13 RX ADMIN — Medication 10 MG: at 21:33

## 2021-09-13 RX ADMIN — CARVEDILOL 12.5 MG: 6.25 TABLET, FILM COATED ORAL at 08:19

## 2021-09-13 RX ADMIN — LINAGLIPTIN 5 MG: 5 TABLET, FILM COATED ORAL at 08:19

## 2021-09-13 RX ADMIN — APIXABAN 5 MG: 5 TABLET, FILM COATED ORAL at 08:19

## 2021-09-13 RX ADMIN — Medication 1000 MCG: at 08:19

## 2021-09-13 RX ADMIN — SERTRALINE 50 MG: 50 TABLET, FILM COATED ORAL at 08:19

## 2021-09-13 RX ADMIN — APIXABAN 5 MG: 5 TABLET, FILM COATED ORAL at 21:33

## 2021-09-13 RX ADMIN — KETOTIFEN FUMARATE 1 DROP: 0.35 SOLUTION/ DROPS OPHTHALMIC at 21:33

## 2021-09-13 RX ADMIN — DOCUSATE SODIUM 50 MG AND SENNOSIDES 8.6 MG 1 TABLET: 8.6; 5 TABLET, FILM COATED ORAL at 08:19

## 2021-09-13 RX ADMIN — INSULIN ASPART 3 UNITS: 100 INJECTION, SOLUTION INTRAVENOUS; SUBCUTANEOUS at 17:26

## 2021-09-13 RX ADMIN — LEVOTHYROXINE SODIUM 50 MCG: 50 TABLET ORAL at 05:48

## 2021-09-13 RX ADMIN — Medication 1 TABLET: at 08:19

## 2021-09-13 RX ADMIN — PANTOPRAZOLE SODIUM 40 MG: 40 TABLET, DELAYED RELEASE ORAL at 05:48

## 2021-09-13 RX ADMIN — CHLORTHALIDONE 25 MG: 50 TABLET ORAL at 08:19

## 2021-09-13 RX ADMIN — AMLODIPINE BESYLATE 10 MG: 10 TABLET ORAL at 08:19

## 2021-09-13 RX ADMIN — INSULIN DETEMIR 20 UNITS: 100 INJECTION, SOLUTION SUBCUTANEOUS at 08:25

## 2021-09-13 NOTE — PROGRESS NOTES
[facility]  PROGRESS NOTE     Patient Identification:  Name:  Joycelyn Brown  Age:  81 y.o.  Sex:  female  :  1940  MRN:  [id number]  Visit Number:  [id number]  ROOM: 110/1S     Primary Care Provider:  Donya Looney APRN    Length of stay in inpatient status:  17    Subjective     Chief Compliant:  No chief complaint on file.      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  Patient is awake this morning and seems to be comfortable.  Has no complaints and no evidence of acute distress.  Overnight vitals were reviewed without any fever and blood pressure seems to be well regulated.  Patient is alert and denies any chest pain, shortness of breath, headache, loss of consciousness, nausea, vomiting, diarrhea or abdominal pain.  Denies any difficulty participating with physical or occupational therapies and has been pleased with overall progress.  Patient is willing to participate with therapy this morning.        Objective     Current Hospital Meds:amLODIPine, 10 mg, Oral, BID  apixaban, 5 mg, Oral, Q12H  ascorbic acid, 500 mg, Oral, Daily  atorvastatin, 80 mg, Oral, Nightly  carvedilol, 12.5 mg, Oral, BID With Meals  chlorthalidone, 25 mg, Oral, Daily  insulin aspart, 0-7 Units, Subcutaneous, TID AC  insulin detemir, 20 Units, Subcutaneous, Q12H  ketotifen, 1 drop, Both Eyes, TID  levothyroxine, 50 mcg, Oral, Q AM  linagliptin, 5 mg, Oral, Daily  melatonin, 10 mg, Oral, Nightly  multivitamin with minerals, 1 tablet, Oral, Daily  pantoprazole, 40 mg, Oral, Q AM  senna-docusate sodium, 1 tablet, Oral, BID  sertraline, 50 mg, Oral, Daily  vitamin B-12, 1,000 mcg, Oral, Daily       ----------------------------------------------------------------------------------------------------------------------  Vital Signs:  Temp:  [98.2 °F (36.8 °C)-98.6 °F (37 °C)] 98.6 °F (37 °C)  Heart Rate:  [63-69] 68  Resp:   [18-20] 20  BP: (115-149)/(64-80) 144/80  SpO2:  [95 %-97 %] 97 %  on  Flow (L/min):  [2-3] 3;   Device (Oxygen Therapy): nasal cannula  Body mass index is 28.19 kg/m².    Wt Readings from Last 3 Encounters:   08/27/21 81.6 kg (180 lb)   02/10/15 81.7 kg (180 lb 0.1 oz)     Intake & Output (last 3 days)       09/10 0701 - 09/11 0700 09/11 0701 - 09/12 0700 09/12 0701 - 09/13 0700 09/13 0701 - 09/14 0700    P.O. 700 920 840 120    Total Intake(mL/kg) 700 (8.6) 920 (11.3) 840 (10.3) 120 (1.5)    Net +700 +920 +840 +120            Urine Unmeasured Occurrence 6 x 7 x 4 x     Stool Unmeasured Occurrence 2 x 2 x          Diet Soft Texture; Whole Foods; Thin  ----------------------------------------------------------------------------------------------------------------------  Physical exam:  Constitutional:   81-year-old female in no distress, very stable with no complaints.  HEENT: Normocephalic atraumatic  Neck: Supple   Cardiovascular: Regular rate and rhythm  Pulmonary/Chest: Slightly coarse but fair air movement  Abdominal: Positive bowel sounds soft.   Musculoskeletal: No arthropathy  Neurological: Right-sided hemiparesis  Skin: No rash    ----------------------------------------------------------------------------------------------------------------------    Last echocardiogram:    ----------------------------------------------------------------------------------------------------------------------  Results from last 7 days   Lab Units 09/11/21 0315   WBC 10*3/mm3 10.34   HEMOGLOBIN g/dL 13.1   HEMATOCRIT % 39.2   MCV fL 92.2   MCHC g/dL 33.4   PLATELETS 10*3/mm3 311         Results from last 7 days   Lab Units 09/11/21 0315   SODIUM mmol/L 139   POTASSIUM mmol/L 3.7   CHLORIDE mmol/L 98   CO2 mmol/L 30.8*   BUN mg/dL 30*   CREATININE mg/dL 1.18*   EGFR IF NONAFRICN AM mL/min/1.73 44*   CALCIUM mg/dL 9.4   GLUCOSE mg/dL 190*   Estimated Creatinine Clearance: 41.8 mL/min (A) (by C-G formula based on SCr of 1.18  mg/dL (H)).  No results found for: AMMONIA              Glucose   Date/Time Value Ref Range Status   09/13/2021 0616 195 (H) 70 - 130 mg/dL Final     Comment:     Meter: [id number] : [id number] Miko Crystal   09/12/2021 2023 254 (H) 70 - 130 mg/dL Final     Comment:     Meter: [id number] : [id number] Miko Crystal   09/12/2021 1633 246 (H) 70 - 130 mg/dL Final     Comment:     Meter: [id number] : [id number] GARZA LUCI   09/12/2021 1113 230 (H) 70 - 130 mg/dL Final     Comment:     Meter: [id number] : [id number] GARZA LUCI   09/12/2021 0557 140 (H) 70 - 130 mg/dL Final     Comment:     Meter: [id number] : [id number] Sebastian Virginia C   09/11/2021 2009 241 (H) 70 - 130 mg/dL Final     Comment:     Meter: [id number] : [id number] Sebastian Virginia C   09/11/2021 1618 199 (H) 70 - 130 mg/dL Final     Comment:     Meter: [id number] : [id number] preet sahra   09/11/2021 1102 285 (H) 70 - 130 mg/dL Final     Comment:     RN Notified Meter: [id number] : [id number] LUCI RITTER     No results found for: TSH, FREET4  No results found for: PREGTESTUR, PREGSERUM, HCG, HCGQUANT  Pain Management Panel    There is no flowsheet data to display.       Brief Urine Lab Results     None        No results found for: BLOODCX      No results found for: URINECX  No results found for: WOUNDCX  No results found for: STOOLCX        I have personally looked at the labs and they are summarized above.  ----------------------------------------------------------------------------------------------------------------------  Detailed radiology reports for the last 24 hours:    Imaging Results (Last 24 Hours)     ** No results found for the last 24 hours. **        Final impressions for the last 30 days of radiology reports:    FL Video Swallow Single Contrast    Result Date: 9/10/2021  No evidence of aspiration.  For additional information please see the report provided by the speech therapy service  This  report was finalized on 9/10/2021 10:27 AM by Dr. Reg Morales MD.      FL Video Swallow Single Contrast    Result Date: 9/3/2021      Please see the speech pathologist's report for additional information.  This report was finalized on 9/3/2021 12:31 PM by Dr. Anthony Gresham MD.      FL Video Swallow Single Contrast    Result Date: 8/28/2021  1.  Penetration with aspiration of thin liquids. 2. Please see dysphasia notes for further details.  This report was finalized on 8/28/2021 11:49 AM by Dr. Obie Fleming MD.      I have personally looked at the radiology images and read the final radiology report.    Assessment & Plan      Status post left MCA pontine CVA--patient currently on thin liquids and a soft diet.  We will continue Eliquis, statin therapy.  Last week patient was requiring maximum assistance for for bed mobility; 2 person maximum assistance for up with bed to chair and chair to bed independence.  To person moderate assistance for sit to stand and stand to sit independence.  Ambulated 6 feet x 5 on the parallel bars with 2 person moderate assistance.  Patient continues to get strengthening exercises performed as well as working on fine motor exercises.  Patient continues to be followed by speech therapy to improve her swallowing apparatus.    Constipation--continue MiraLAX and Senokot    Hypertension--well controlled.  Continue Norvasc, Coreg, chlorthalidone    Depression had increased Zoloft to 50 mg a day this week    Hypothyroidism continue Synthroid        VTE Prophylaxis:   Mechanical Order History:      Ordered        08/27/21 1125  Maintain Sequential Compression Device  Continuous         08/27/21 1125  Place Sequential Compression Device  Once                 Pharmalogical Order History:      Ordered     Dose Route Frequency Stop    08/27/21 1125  apixaban (ELIQUIS) tablet 5 mg      5 mg PO Every 12 Hours Scheduled --                    Joselito Hernandez MD  09/13/21  08:37 EDT

## 2021-09-13 NOTE — PROGRESS NOTES
Inpatient Rehabilitation Functional Measures Assessment and Plan of Care    Plan of Care  Self Care    [OT] Dressing (Upper)(Active)  Current Status(09/13/2021): max  Weekly Goal(09/14/2021): mod  Discharge Goal: ModA    Functional Measures  PAT Eating:  PAT Grooming:  PAT Bathing:  PAT Upper Body Dressing:  PAT Lower Body Dressing:  PAT Toileting:    PAT Bladder Management  Level of Assistance:  Frequency/Number of Accidents this Shift:    PAT Bowel Management  Level of Assistance:  Frequency/Number of Accidents this Shift:    PAT Bed/Chair/Wheelchair Transfer:  PAT Toilet Transfer:  PAT Tub/Shower Transfer:    Previously Documented Mode of Locomotion at Discharge:  [facility] Expected Mode of Locomotion at Discharge:  PAT Walk/Wheelchair:  PAT Stairs:    PAT Comprehension:  PAT Expression:  PAT Social Interaction:  PAT Problem Solving:  PAT Memory:    Therapy Mode Minutes  Occupational Therapy: Individual: 90 minutes.  Physical Therapy:  Speech Language Pathology:    Discharge Functional Goals:    Signed by: Tiff Monge, Occupational Therapist

## 2021-09-13 NOTE — THERAPY PROGRESS REPORT/RE-CERT
Inpatient Rehabilitation - Occupational Therapy Progress Note     Tre     Patient Name: Joycelyn Brown  : 1940  MRN: [id number]    Today's Date: 2021                 Admit Date: 2021         ICD-10-CM ICD-9-CM   1. CVA (cerebral vascular accident) (CMS/[facility])  I63.9 434.91       Patient Active Problem List   Diagnosis   • CVA (cerebral vascular accident) (CMS/[facility])       History reviewed. No pertinent past medical history.    No past surgical history on file.             IRF OT ASSESSMENT FLOWSHEET (last 12 hours)      IRF OT Evaluation and Treatment     [facility] Name [facility]          OT Time and Intention    Document Type  re-evaluation  -     Mode of Treatment  individual therapy;occupational therapy  -     Patient Effort  good  -AH     Row Name [facility]          General Information    Patient/Family/Caregiver Comments/Observations  patient agreeable to therapy this am. patient continues to improve slowly with RUE ROM, grasp and self care.  -     Existing Precautions/Restrictions  fall;oxygen therapy device and L/min  -AH     Row Name [facility]          Transfers    Bed-Chair [place] (Transfers)  maximum assist (25% patient effort)  -AH     Row Name [facility]          Motor Skills    Motor Skills  coordination;functional endurance;therapeutic exercise;neuro-muscular function  -     Therapeutic Exercise  -- RUE AROM, wt bearing, AAROM, vibration, PROM  -AH     Row Name [facility]          Bathing    [place] Level (Bathing)  maximum assist (25% patient effort);moderate assist (50% patient effort)  -AH     Row Name [facility]          Upper Body Dressing    [place] Level (Upper Body Dressing)  moderate assist (50-74% patient effort);minimum assist (75% or more patient effort)  -AH     Row Name 21 120          Lower Body Dressing    [place] Level (Lower Body Dressing)  maximum assist (25% patient effort)  -AH     Row Name 21 120           Grooming    [place] Level (Grooming)  minimum assist (75% patient effort)  -AH     Row Name 09/13/21 1209          Toileting    [place] Level (Toileting)  maximum assist (25% patient effort);dependent (less than 25% patient effort)  -AH     Row Name 09/13/21 1209          Self-Feeding    [place] Level (Self-Feeding)  supervision;set up  -AH     Row Name 09/13/21 1209          Modality Intervention (Comprehensive)    Modality Treatment  NMES/FES/[place]  -     Additional Documentation  -- R wrist and digit extension  -AH     Row Name 09/13/21 1209          Bathing Goal 1 (OT-IRF)    Progress/Outcomes (Bathing Goal 1, OT-IRF)  goal partially met;goal ongoing  -AH     Row Name 09/13/21 1209          UB Dressing Goal 1 (OT-IRF)    Progress/Outcomes (UB Dressing Goal 1, OT-IRF)  goal met  -AH     Row Name 09/13/21 1209          UB Dressing Goal 2 (OT-IRF)    Progress/Outcomes (UB Dressing Goal 2, OT-IRF)  goal partially met;goal ongoing  -AH     Row Name 09/13/21 1209          Grooming Goal 1 (OT-IRF)    Progress/Outcomes (Grooming Goal 1, OT-IRF)  goal met  -AH     Row Name 09/13/21 1209          Grooming Goal 2 (OT-IRF)    Progress/Outcomes (Grooming Goal 2, OT-IRF)  goal partially met;goal ongoing  -AH     Row Name 09/13/21 1209          Toileting Goal 1 (OT-IRF)    Progress/Outcomes (Toileting Goal 1, OT-IRF)  goal partially met;goal ongoing  -       User Key  (r) = Recorded By, (t) = Taken By, (c) = Cosigned By    Initials Name Effective Dates     Maria Guadalupe Mogne, OT 06/16/21 -            Occupational Therapy Education                 Title: PT OT SLP Therapies (Done)     Topic: Occupational Therapy (Done)     Point: ADL training (Done)     Description:   Instruct learner(s) on proper safety adaptation and remediation techniques during self care or transfers.   Instruct in proper use of assistive devices.              Learning Progress Summary           Patient Acceptance, E,TB, VU by BEVERLY  at 9/2/2021 2003    Acceptance, E,D, VU,NR by  at 9/2/2021 1458    Acceptance, E,TB, VU by DG at 9/1/2021 2256    Acceptance, E,D, VU,NR by  at 9/1/2021 1520    Acceptance, E,D, VU,NR by  at 8/31/2021 1541    Acceptance, E, VU,NR by  at 8/30/2021 1507    Acceptance, E,TB, VU by  at 8/28/2021 2040    Acceptance, E,D, VU,NR by AB at 8/28/2021 1410                   Point: Home exercise program (Done)     Description:   Instruct learner(s) on appropriate technique for monitoring, assisting and/or progressing therapeutic exercises/activities.              Learning Progress Summary           Patient Acceptance, E,TB, VU by DG at 9/2/2021 2003    Acceptance, E,TB, VU by DG at 9/1/2021 2256    Acceptance, E,TB, VU by DG at 8/28/2021 2040                   Point: Precautions (Done)     Description:   Instruct learner(s) on prescribed precautions during self-care and functional transfers.              Learning Progress Summary           Patient Acceptance, E,TB, VU by DG at 9/2/2021 2003    Acceptance, E,D, VU,NR by  at 9/2/2021 1458    Acceptance, E,TB, VU by  at 9/1/2021 2256    Acceptance, E,D, VU,NR by  at 9/1/2021 1520    Acceptance, E,D, VU,NR by  at 8/31/2021 1541    Acceptance, E, VU,NR by  at 8/30/2021 1507    Acceptance, E,TB, VU by  at 8/28/2021 2040    Acceptance, E,D, VU,NR by AB at 8/28/2021 1410                               User Key     Initials Effective Dates Name Provider Type Discipline    AB 06/16/21 -  Yazmin Chaves, OT Occupational Therapist OT     06/16/21 -  Melissa Swift, RN Registered Nurse Nurse     06/16/21 -  Debbie Mehta, OT Occupational Therapist OT     06/16/21 -  Lucrecia Andrews, OT Occupational Therapist OT                    OT Recommendation and Plan                         Time Calculation:     Time Calculation- OT     Row Name 09/13/21 1226             Time Calculation- OT    OT Start Time  0745  -      OT Stop Time  0915  -      OT  Time Calculation (min)  90 min  -        User Key  (r) = Recorded By, (t) = Taken By, (c) = Cosigned By    Initials Name Provider Type     Maria Guadalupe Monge, OT Occupational Therapist        Therapy Charges for Today     Code Description Service Date Service Provider Modifiers Qty    [id number]  OT SELF CARE/MGMT/TRAIN EA 15 MIN 9/13/2021 Maria Guadalupe Monge OT GO, KX 2    [id number]  OT THER PROC EA 15 MIN 9/13/2021 Maria Guadalupe Monge OT GO, KX 1    [id number]  OT NEUROMUSC RE EDUCATION EA 15 MIN 9/13/2021 Maria Guadalupe Monge OT GO, KX 3                   Maria Guadalupe Monge OT  9/13/2021

## 2021-09-13 NOTE — THERAPY RE-EVALUATION
Inpatient Rehabilitation - Physical Therapy Re-Evaluation        Tre     Patient Name: Joycelyn Brown  : 1940  MRN: [id number]    Today's Date: 2021                    Admit Date: 2021      Visit Dx:     ICD-10-CM ICD-9-CM   1. CVA (cerebral vascular accident) (CMS/[facility])  I63.9 434.91       Patient Active Problem List   Diagnosis   • CVA (cerebral vascular accident) (CMS/[facility])       History reviewed. No pertinent past medical history.    No past surgical history on file.       PT ASSESSMENT (last 12 hours)      IRF PT Evaluation and Treatment     Row Name 21 1049          PT Time and Intention    Document Type  re-evaluation;daily treatment  -LB     Mode of Treatment  physical therapy;individual therapy  -LB     Row Name 21 1049          General Information    Patient Profile Reviewed  yes  -LB     Existing Precautions/Restrictions  fall;oxygen therapy device and L/min R HP  -LB     Row Name 21 1049          Cognition/Psychosocial    Affect/Mental Status (Cognitive)  WFL  -LB     Follows Commands (Cognition)  WFL  -LB     Personal Safety Interventions  gait belt;nonskid shoes/slippers when out of bed;supervised activity  -LB     Row Name 21 1049          Pain Assessment    Additional Documentation  --  -LB     Row Name 21 1049          Pain Scale: FACES Pre/Post-Treatment    Pain: FACES Scale, Pretreatment  0-->no hurt  -LB     Posttreatment Pain Rating  0-->no hurt  -LB     Row Name 21 1049          Range of Motion (ROM)    Range of Motion  -- RLE full PROM  -LB     Row Name 21 1049          Strength Comprehensive (MMT)    Comment, General Manual Muscle Testing (MMT) Assessment  RLE:hip ab/ad, quad/hams 2-/5, remainder is trace except DF/PF 0/5  -LB     Row Name 21 1049          Bed Mobility    Bed Mobility  --  -LB     Supine-Sit [place] (Bed Mobility)  --  -LB     Supine-Sit-Supine [place] (Bed Mobility)  maximum assist (25% patient  effort);verbal cues;nonverbal cues (demo/gesture)  -LB     Comment (Bed Mobility)  cannot lift or scoot RLE  -LB     Row Name 09/13/21 1049          Transfer Assessment/Treatment    Transfers  --  -LB     Row Name 09/13/21 1049          Transfers    Bed-Chair [place] (Transfers)  maximum assist (25% patient effort);verbal cues;nonverbal cues (demo/gesture)  -LB     Chair-Bed [place] (Transfers)  maximum assist (25% patient effort);verbal cues;nonverbal cues (demo/gesture)  -LB     Assistive Device (Bed-Chair Transfers)  wheelchair  -LB     Sit-Stand [place] (Transfers)  moderate assist (50% patient effort);maximum assist (25% patient effort);verbal cues;nonverbal cues (demo/gesture)  -LB     Stand-Sit [place] (Transfers)  moderate assist (50% patient effort);maximum assist (25% patient effort);verbal cues;nonverbal cues (demo/gesture)  -LB     Row Name 09/13/21 1049          Chair-Bed Transfer    Assistive Device (Chair-Bed Transfers)  wheelchair  -LB     Row Name 09/13/21 1049          Sit-Stand Transfer    Assistive Device (Sit-Stand Transfers)  -- PB  -LB     Row Name 09/13/21 1049          Stand-Sit Transfer    Assistive Device (Stand-Sit Transfers)  -- PB  -LB     Row Name 09/13/21 1049          Stand Pivot/Stand Step Transfer    Stand Pivot/Stand Step [place] (Transfers)  --  -LB     Assistive Device (Stand Pivot Stand Step Transfer)  --  -LB     Row Name 09/13/21 1049          Safety Issues, Functional Mobility    Impairments Affecting Function (Mobility)  balance;endurance/activity tolerance;muscle tone abnormal;postural/trunk control;strength  -LB     Row Name 09/13/21 1049          Balance    Static Sitting Balance  -- SBA at edge of mat  -LB     Dynamic Sitting Balance  -- bean bag toss at edge of mat unsupported  -LB     Static Standing Balance  -- mod A in PB, blocking right knee, cues for posture/wt. shift  -LB     Dynamic Standing Balance  -- weight shifting R/L, posture  corrections  -LB     Row Name 09/13/21 1049          Motor Skills    Therapeutic Exercise  -- supine ex, sitting ex  -LB     Row Name 09/13/21 1049          IRF PT Goals    Bed Mobility Goal Selection (PT-IRF)  --  -LB     Transfer Goal Selection (PT-IRF)  --  -LB     Gait (Walking Locomotion) Goal Selection (PT-IRF)  --  -LB     Stairs Goal Selection (PT-IRF)  --  -LB     Row Name 09/13/21 1049          Bed Mobility Goal 1 (PT-IRF)    Activity/Assistive Device (Bed Mobility Goal 1, PT-IRF)  bed mobility activities, all  -LB     [place] Level (Bed Mobility Goal 1, PT-IRF)  standby assist  -LB     Time Frame (Bed Mobility Goal 1, PT-IRF)  long-term goal (LTG)  -LB     Progress/Outcomes (Bed Mobility Goal 1, PT-IRF)  goal ongoing  -LB     Row Name 09/13/21 1049          Transfer Goal 1 (PT-IRF)    Activity/Assistive Device (Transfer Goal 1, PT-IRF)  all transfers  -LB     [place] Level (Transfer Goal 1, PT-IRF)  minimum assist (75% or more patient effort)  -LB     Time Frame (Transfer Goal 1, PT-IRF)  by discharge  -LB     Progress/Outcomes (Transfer Goal 1, PT-IRF)  goal ongoing  -LB     Row Name 09/13/21 1049          Gait/Walking Locomotion Goal 1 (PT-IRF)    Activity/Assistive Device (Gait/Walking Locomotion Goal 1, PT-IRF)  gait (walking locomotion);assistive device use  -LB     Gait/Walking Locomotion Distance Goal 1 (PT-IRF)  5  -LB     [place] Level (Gait/Walking Locomotion Goal 1, PT-IRF)  minimum assist (75% or more patient effort)  -LB     Time Frame (Gait/Walking Locomotion Goal 1, PT-IRF)  long-term goal (LTG)  -LB     Progress/Outcomes (Gait/Walking Locomotion Goal 1, PT-IRF)  goal ongoing  -LB     Row Name 09/13/21 1049          Stairs Goal 1 (PT-IRF)    Activity/Assistive Device (Stairs Goal 1, PT-IRF)  stairs, all skills;ascending stairs;descending stairs;using handrail, left  -LB     Number of Stairs (Stairs Goal 1, PT-IRF)  5  -LB     [place] Level (Stairs Goal 1, PT-IRF)   minimum assist (75% or more patient effort);moderate assist (50-74% patient effort)  -LB     Time Frame (Stairs Goal 1, PT-IRF)  long-term goal (LTG)  -LB     Progress/Outcomes (Stairs Goal 1, PT-IRF)  goal ongoing  -LB     Row Name 09/13/21 1049          Positioning and Restraints    Pre-Treatment Position  sitting in chair/recliner  -LB     In Wheelchair  encouraged to call for assist with carmela at salon  -LB     Row Name 09/13/21 1049          Therapy Assessment/Plan (PT)    Rehab Potential/Prognosis (PT)  adequate, monitor progress closely  -LB     Frequency of Treatment (PT)  5 times per week  -LB     Estimated Duration of Therapy (PT)  2 weeks  -LB     Problem List (PT)  problems related to;balance;hemiparesis/hemiplegia;mobility;strength  -LB     Row Name 09/13/21 1049          Weekly Progress Summary (PT)    Functional Goal Overall Progress (PT)  progressing toward functional goals slower than expected  -LB     Impairments Still Limiting Function (PT)  muscle tone abnormal;strength decreased;impaired balance;impaired functional activity tolerance;postural control impaired  -LB     Recommendations (PT)  continue PT and current POC  -LB     Plan for Continued Service After Discharge (PT)   PT  -LB       User Key  (r) = Recorded By, (t) = Taken By, (c) = Cosigned By    Initials Name Provider Type    Anju Carrera, PT Physical Therapist           Physical Therapy Education                 Title: PT OT SLP Therapies (Done)     Topic: Physical Therapy (Done)     Point: Mobility training (Done)     Learning Progress Summary           Patient Acceptance, E, VU,NR by LB at 9/13/2021 1121    Acceptance, E,TB, VU by RF at 9/10/2021 1556    Acceptance, E,TB, VU by RF at 9/9/2021 1551    Acceptance, E,TB, VU by RF at 9/8/2021 1602    Acceptance, E,TB, VU by RF at 9/7/2021 1605    Acceptance, E,TB, VU by RF at 9/6/2021 1557    Acceptance, E,TB, VU by RF at 9/3/2021 1532    Acceptance, E,TB, VU by DG at 9/2/2021  2003    Acceptance, E,TB, VU by RF at 9/2/2021 1610    Acceptance, E,TB, VU by DG at 9/1/2021 2256    Acceptance, E,TB, VU by RF at 9/1/2021 1548    Acceptance, E,TB, VU by RF at 8/31/2021 1423    Acceptance, E,TB, VU by RF at 8/30/2021 1548                   Point: Home exercise program (Done)     Learning Progress Summary           Patient Acceptance, E, VU,NR by LB at 9/13/2021 1121    Acceptance, E,TB, VU by RF at 9/10/2021 1556    Acceptance, E,TB, VU by RF at 9/9/2021 1551    Acceptance, E,TB, VU by RF at 9/8/2021 1602    Acceptance, E,TB, VU by RF at 9/7/2021 1605    Acceptance, E,TB, VU by RF at 9/6/2021 1557    Acceptance, E,TB, VU by RF at 9/3/2021 1532    Acceptance, E,TB, VU by DG at 9/2/2021 2003    Acceptance, E,TB, VU by RF at 9/2/2021 1610    Acceptance, E,TB, VU by DG at 9/1/2021 2256    Acceptance, E,TB, VU by RF at 9/1/2021 1548    Acceptance, E,TB, VU by RF at 8/31/2021 1423    Acceptance, E,TB, VU by RF at 8/30/2021 1548                   Point: Body mechanics (Done)     Learning Progress Summary           Patient Acceptance, E, VU,NR by LB at 9/13/2021 1121    Acceptance, E,TB, VU by RF at 9/10/2021 1556    Acceptance, E,TB, VU by RF at 9/9/2021 1551    Acceptance, E,TB, VU by RF at 9/8/2021 1602    Acceptance, E,TB, VU by RF at 9/7/2021 1605    Acceptance, E,TB, VU by RF at 9/6/2021 1557    Acceptance, E,TB, VU by RF at 9/3/2021 1532    Acceptance, E,TB, VU by DG at 9/2/2021 2003    Acceptance, E,TB, VU by RF at 9/2/2021 1610    Acceptance, E,TB, VU by DG at 9/1/2021 2256    Acceptance, E,TB, VU by RF at 9/1/2021 1548    Acceptance, E,TB, VU by RF at 8/31/2021 1423    Acceptance, E,TB, VU by RF at 8/30/2021 1548                   Point: Precautions (Done)     Learning Progress Summary           Patient Acceptance, E, VU,NR by LB at 9/13/2021 1121    Acceptance, E,TB, VU by RF at 9/10/2021 1556    Acceptance, E,TB, VU by RF at 9/9/2021 1551    Acceptance, E,TB, VU by RF at 9/8/2021 1602     Acceptance, E,TB, VU by RF at 9/7/2021 1605    Acceptance, E,TB, VU by RF at 9/6/2021 1557    Acceptance, E,TB, VU by RF at 9/3/2021 1532    Acceptance, E,TB, VU by DG at 9/2/2021 2003    Acceptance, E,TB, VU by RF at 9/2/2021 1610    Acceptance, E,TB, VU by DG at 9/1/2021 2256    Acceptance, E,TB, VU by RF at 9/1/2021 1548    Acceptance, E,TB, VU by RF at 8/31/2021 1423    Acceptance, E,TB, VU by RF at 8/30/2021 1548                               User Key     Initials Effective Dates Name Provider Type Discipline     06/16/21 -  Melissa Swift RN Registered Nurse Nurse     06/16/21 -  Anju Reis, PT Physical Therapist PT     06/16/21 -  Malinda Alicea PTA Physical Therapy Assistant PT                PT Recommendation and Plan    Frequency of Treatment (PT): 5 times per week                     Time Calculation:     PT Charges     Row Name 09/13/21 1121             Time Calculation    Start Time  1000  -LB      Stop Time  1130  -LB      Time Calculation (min)  90 min  -LB      PT Received On  09/13/21  -LB      PT Goal Re-Cert Due Date  09/17/21  -LB        User Key  (r) = Recorded By, (t) = Taken By, (c) = Cosigned By    Initials Name Provider Type    LB Anju Reis, PT Physical Therapist          Therapy Charges for Today     Code Description Service Date Service Provider Modifiers Qty    59608557947 HC PT NEUROMUSC RE EDUCATION EA 15 MIN 9/13/2021 Anju Reis, PT GP, KX 1    89643358416 HC PT THER PROC EA 15 MIN 9/13/2021 Anju Reis, PT GP, KX 3    59553930950 HC PT THERAPEUTIC ACT EA 15 MIN 9/13/2021 Anju Reis, PT GP, KX 2                   Anju Reis, PT  9/13/2021

## 2021-09-13 NOTE — THERAPY TREATMENT NOTE
Inpatient Rehabilitation - Speech Language Pathology   Swallow Treatment Note [facility] [place]     Patient Name: Joycelyn Brown  : 1940  MRN: [id number]  Today's Date: 2021               Admit Date: 2021    Visit Dx:     ICD-10-CM ICD-9-CM   1. CVA (cerebral vascular accident) (CMS/[facility])  I63.9 434.91     Patient Active Problem List   Diagnosis   • CVA (cerebral vascular accident) (CMS/[facility])     History reviewed. No pertinent past medical history.  No past surgical history on file.    Dysphagia Tx Note:      Ms. Brown is seen in speech therapy office of inpatient physical rehabilitation this date for formal dysphagia tx.  She is cooperative to participate on this date.  She is s/p participation w/ OT on this am.      Long Term Goal:  Pt will accept least restrictive diet tolerance w/o overt s/s aspiration.      Short Term Goals:  1. Pt will tolerate facial massage to R  Facial surface for 3-7 minutes to increase surface blood flow, sensation and ROM over 3 consecutive sessions.   *AM: R mechanical facial massage for 7 minutes w/ mildly increased surface blood flow.   *PM: R mechanical facial massage for 7 minutes w/ mildly increased surface blood flow.  R tactile facial massage for 7 minutes w/ mildly increased surface blood flow.    2. Pt will perform OROM/MONAE exercises x3 sets x10 reps w/ min cues.  *AM: x12 sets x10 reps w/ min cues   *PM: x3 sets x10 reps w/ min cues.     3. Pt will perform resistive breathing exercises x3 sets x5 reps w/resistance of 2-5  To improve respiratory support and control.   *GOAL MET    4. Pt will perform laryngeal adduction/elevation exercises x3 sets x10 reps w/ min cues.   *GOAL MET    5. Pt will perform Shaker exercises sustained x3 sets x5 reps for 30+ seconds over 3 consecutive sessions.   *GOAL MET    6. Pt will perform Shaker exercises repetitive x3 sets x12 reps w/ mod cues.   *GOAL MET      7. Pt will perform compensatory techniques during meals w/ min cues.  *PM:  Pt reports no difficulty w/ midday meal      8. Pt will participate in instrumental re-evaluation of swallowing fnx in 7-10 days, pending progress towards this poc.  *GOAL MET    Thank you-  Maryam Jones M.S., [facility]-SLP      -------------------------------------------------------------------------------------------------------      DYSARTHRIA THERAPY PLAN OF CARE:     Ms. Brown is seen in speech therapy office of inpatient physical rehabilitation this date for formal dysphagia tx.  She is cooperative to participate on this date.  She is s/p participation w/ OT on this date.        Long Term Goal:  Pt will improve motor speech to allow for maximal communication and safety in adls.      Short Term Goals:  1. Pt will tolerate facial massage to R dacial surface for 3-7 minutes to increase surface blood flow, sensation and ROM over 3 consecutive sessions.   *AM: R mechanical facial massage for 7 Minutes w/ mildly increased surface blood flow.   *PM: R mechanical facial massage for 7 minutes w/ mildly increased surface blood flow.  R tactile facial massage for 7 minutes w/ mildly increased surface blood flow.    2. Pt will perform OROM/MONAE exercises x3 sets x10 reps w/ min cues.  *x3 sets x10 reps w/ mod cues.   *AM: x12 sets x10 reps w/ min cues   *PM: x3 sets x10 reps w/ min cues.    3. Pt will perform resistive breathing exercises x3 sets x5 reps w/resistance of 2-5 to improve respiratory support and control.   *GOAL MET    4. Pt will accurately produce multi-syllabic words 4/5 opp w/ min cues over 3 consecutive sessions.   *AM: 4/5 opp w/ min cues    5. Pt will overarticulate and hyperphonate in connected speech 4/5 opp w/ min cues over 3 consecutive sessions.   *AM: 4/5 opp w/ min cues in general conversation   *PM: 4/5 opp w/ min cues in general conversation    6. Pt will improve intelligibility to 100% for both known/unknown context in conversation w/ min cues over 3 consecutive sessions.   *AM: approx 95%  intelligible w/ unknown context and min cues for decreased rate and overarticulation.   *PM: approx 100% intelligible w/ known context.      *Goals to be added/modified as necessary.      Thank you-  Maryam Jones M.S., CCC-SLP        SLP Recommendation and Plan  Continue modified po diet and tx per poc.     EDUCATION  The patient has been educated in the following areas:   Dysphagia (Swallowing Impairment) Modified Diet Instruction.     Time Calculation:   Time Calculation- SLP     Row Name 09/13/21 1028             Time Calculation- SLP    SLP Start Time  0920  -      SLP Stop Time  1000  -      SLP Time Calculation (min)  40 min  -      SLP - Next Appointment  09/14/21  -        User Key  (r) = Recorded By, (t) = Taken By, (c) = Cosigned By    Initials Name Provider Type    Maryam Adams MS, CFY-SLP Speech and Language Pathologist        Therapy Charges for Today     Code Description Service Date Service Provider Modifiers Qty    17494846344 HC ST TREATMENT SPEECH 1 9/13/2021 Maryam Jones MS, CFY-SLP GN, KX 1    94366947484 HC ST TREATMENT SWALLOW 2 9/13/2021 Maryam Jones MS, CFY-SLP GN, KX 1        Patient was not wearing a face mask during this therapy encounter. Therapist used appropriate personal protective equipment including mask, eye protection and gloves.  Mask used was standard procedure mask. Appropriate PPE was worn during the entire therapy session. The patient did not cough during this evaluation. Therapist was within 6 feet for 15 minutes or more during the evaluation. Hand hygiene was completed before and after therapy session. Patient is not in enhanced droplet precautions.     Maryam Jones MS, LOVE-SLP  9/13/2021

## 2021-09-13 NOTE — PROGRESS NOTES
Occupational Therapy:    Physical Therapy: Individual: 90 minutes.    Speech Language Pathology:    Signed by: Anju Reis, Physical Therapist

## 2021-09-13 NOTE — PROGRESS NOTES
Inpatient Rehabilitation Functional Measures Assessment and Plan of Care    Plan of Care  Communication    [ST] Dysarthria(Active)  Current Status(09/14/2021): Pt presents w/ a mild dysarthria negatively  impacting communication in adls.  Weekly Goal(09/20/2021): R facial massage  Discharge Goal: Pt will improve motor speech to allow for maximal commmuncation  in adls.        Swallow Function    [ST] Swallowing(Active)  Current Status(09/14/2021): Nectar thick liquids  Weekly Goal(09/20/2021): Tolerate facial massage to R facial surface  Discharge Goal: Least restrictive po diet    Functional Measures  PAT Eating:  PAT Grooming:  PAT Bathing:  PAT Upper Body Dressing:  PAT Lower Body Dressing:  PAT Toileting:    PAT Bladder Management  Level of Assistance:  Frequency/Number of Accidents this Shift:    PAT Bowel Management  Level of Assistance:  Frequency/Number of Accidents this Shift:    PAT Bed/Chair/Wheelchair Transfer:  PAT Toilet Transfer:  PAT Tub/Shower Transfer:    Previously Documented Mode of Locomotion at Discharge:  [facility] Expected Mode of Locomotion at Discharge:  PAT Walk/Wheelchair:  PAT Stairs:    [facility] Comprehension:  PAT Expression:  PAT Social Interaction:  PAT Problem Solving:  PAT Memory:    Therapy Mode Minutes  Occupational Therapy:  Physical Therapy:  Speech Language Pathology: Individual: 80 minutes.    Discharge Functional Goals:    Signed by: MEET Chen

## 2021-09-14 LAB
GLUCOSE BLDC GLUCOMTR-MCNC: 170 MG/DL (ref 70–130)
GLUCOSE BLDC GLUCOMTR-MCNC: 258 MG/DL (ref 70–130)
GLUCOSE BLDC GLUCOMTR-MCNC: 261 MG/DL (ref 70–130)
GLUCOSE BLDC GLUCOMTR-MCNC: 434 MG/DL (ref 70–130)

## 2021-09-14 PROCEDURE — 97032 APPL MODALITY 1+ESTIM EA 15: CPT | Performed by: OCCUPATIONAL THERAPIST

## 2021-09-14 PROCEDURE — 82962 GLUCOSE BLOOD TEST: CPT

## 2021-09-14 PROCEDURE — 97112 NEUROMUSCULAR REEDUCATION: CPT | Performed by: OCCUPATIONAL THERAPIST

## 2021-09-14 PROCEDURE — 97535 SELF CARE MNGMENT TRAINING: CPT | Performed by: OCCUPATIONAL THERAPIST

## 2021-09-14 PROCEDURE — 94799 UNLISTED PULMONARY SVC/PX: CPT

## 2021-09-14 PROCEDURE — 63710000001 INSULIN DETEMIR PER 5 UNITS: Performed by: INTERNAL MEDICINE

## 2021-09-14 PROCEDURE — 92526 ORAL FUNCTION THERAPY: CPT

## 2021-09-14 PROCEDURE — 92507 TX SP LANG VOICE COMM INDIV: CPT

## 2021-09-14 PROCEDURE — 97530 THERAPEUTIC ACTIVITIES: CPT

## 2021-09-14 PROCEDURE — 63710000001 INSULIN ASPART PER 5 UNITS: Performed by: INTERNAL MEDICINE

## 2021-09-14 PROCEDURE — 97112 NEUROMUSCULAR REEDUCATION: CPT

## 2021-09-14 PROCEDURE — 97116 GAIT TRAINING THERAPY: CPT

## 2021-09-14 RX ADMIN — INSULIN DETEMIR 20 UNITS: 100 INJECTION, SOLUTION SUBCUTANEOUS at 20:55

## 2021-09-14 RX ADMIN — Medication 1 TABLET: at 10:00

## 2021-09-14 RX ADMIN — LEVOTHYROXINE SODIUM 50 MCG: 50 TABLET ORAL at 06:01

## 2021-09-14 RX ADMIN — CHLORTHALIDONE 25 MG: 50 TABLET ORAL at 10:00

## 2021-09-14 RX ADMIN — OXYCODONE HYDROCHLORIDE AND ACETAMINOPHEN 500 MG: 500 TABLET ORAL at 10:00

## 2021-09-14 RX ADMIN — AMLODIPINE BESYLATE 10 MG: 10 TABLET ORAL at 10:01

## 2021-09-14 RX ADMIN — DOCUSATE SODIUM 50 MG AND SENNOSIDES 8.6 MG 1 TABLET: 8.6; 5 TABLET, FILM COATED ORAL at 10:00

## 2021-09-14 RX ADMIN — CARVEDILOL 12.5 MG: 6.25 TABLET, FILM COATED ORAL at 10:01

## 2021-09-14 RX ADMIN — KETOTIFEN FUMARATE 1 DROP: 0.35 SOLUTION/ DROPS OPHTHALMIC at 20:55

## 2021-09-14 RX ADMIN — SERTRALINE 50 MG: 50 TABLET, FILM COATED ORAL at 10:00

## 2021-09-14 RX ADMIN — ATORVASTATIN CALCIUM 80 MG: 40 TABLET, FILM COATED ORAL at 20:55

## 2021-09-14 RX ADMIN — INSULIN DETEMIR 20 UNITS: 100 INJECTION, SOLUTION SUBCUTANEOUS at 10:05

## 2021-09-14 RX ADMIN — INSULIN ASPART 7 UNITS: 100 INJECTION, SOLUTION INTRAVENOUS; SUBCUTANEOUS at 12:02

## 2021-09-14 RX ADMIN — APIXABAN 5 MG: 5 TABLET, FILM COATED ORAL at 20:55

## 2021-09-14 RX ADMIN — APIXABAN 5 MG: 5 TABLET, FILM COATED ORAL at 10:00

## 2021-09-14 RX ADMIN — INSULIN ASPART 4 UNITS: 100 INJECTION, SOLUTION INTRAVENOUS; SUBCUTANEOUS at 17:48

## 2021-09-14 RX ADMIN — Medication 1000 MCG: at 10:00

## 2021-09-14 RX ADMIN — KETOTIFEN FUMARATE 1 DROP: 0.35 SOLUTION/ DROPS OPHTHALMIC at 10:02

## 2021-09-14 RX ADMIN — KETOTIFEN FUMARATE 1 DROP: 0.35 SOLUTION/ DROPS OPHTHALMIC at 17:48

## 2021-09-14 RX ADMIN — Medication 10 MG: at 20:55

## 2021-09-14 RX ADMIN — PANTOPRAZOLE SODIUM 40 MG: 40 TABLET, DELAYED RELEASE ORAL at 06:01

## 2021-09-14 RX ADMIN — LINAGLIPTIN 5 MG: 5 TABLET, FILM COATED ORAL at 10:00

## 2021-09-14 RX ADMIN — INSULIN ASPART 2 UNITS: 100 INJECTION, SOLUTION INTRAVENOUS; SUBCUTANEOUS at 10:02

## 2021-09-14 NOTE — THERAPY TREATMENT NOTE
Inpatient Rehabilitation - Occupational Therapy Treatment Note     Tre     Patient Name: Joycelyn Brown  : 1940  MRN: [id number]    Today's Date: 2021                 Admit Date: 2021         ICD-10-CM ICD-9-CM   1. CVA (cerebral vascular accident) (CMS/[facility])  I63.9 434.91       Patient Active Problem List   Diagnosis   • CVA (cerebral vascular accident) (CMS/[facility])       History reviewed. No pertinent past medical history.    No past surgical history on file.             IRF OT ASSESSMENT FLOWSHEET (last 12 hours)      IRF OT Evaluation and Treatment     Row Name 21 1400          OT Time and Intention    Document Type  daily treatment  -     Mode of Treatment  individual therapy;occupational therapy  -     Patient Effort  good  -[facility] Name 21 1400          General Information    Patient/Family/Caregiver Comments/Observations   patient agreeable to therapy. patient completed LE dressing with donning socks max assist and hair care min/mod .  pataient continues to tolerate therapy for RUE ROM, neuro re ed.  -     Existing Precautions/Restrictions  fall;oxygen therapy device and L/min  -[facility] Name 21 1400          Bed Mobility    Supine-Sit-Supine [place] (Bed Mobility)  maximum assist (25% patient effort)  -[facility] Name 21 1400          Transfers    Bed-Chair [place] (Transfers)  maximum assist (25% patient effort)  -[facility]     Row Name 21 1400          Motor Skills    Motor Skills  coordination;functional endurance;neuro-muscular function;therapeutic exercise  -     Neuromuscular Function  right;upper extremity  -     Therapeutic Exercise  -- PROM, AROM ., AAROM, vibration, wt bearing,  -[facility] Name 21 1400          Lower Body Dressing    [place] Level (Lower Body Dressing)  don;socks;maximum assist (25% patient effort)  -[facility]     Row Name 21 1400          Grooming    [place]igent Level (Grooming)  moderate assist (50%  patient effort);minimum assist (75% patient effort);hair care, combing/brushing  -     Row Name 09/14/21 1400          Modality Intervention (Comprehensive)    Modality Treatment  NMES/FES/Bioness right wrist and digit extension  -       User Key  (r) = Recorded By, (t) = Taken By, (c) = Cosigned By    Initials Name Effective Dates    Maria Guadalupe Tyler, OT 06/16/21 -            Occupational Therapy Education                 Title: PT OT SLP Therapies (Done)     Topic: Occupational Therapy (Done)     Point: ADL training (Done)     Description:   Instruct learner(s) on proper safety adaptation and remediation techniques during self care or transfers.   Instruct in proper use of assistive devices.              Learning Progress Summary           Patient Acceptance, E,TB, VU by BEVERLY at 9/2/2021 2003    Acceptance, E,D, VU,NR by  at 9/2/2021 1458    Acceptance, E,TB, VU by BEVERLY at 9/1/2021 2256    Acceptance, E,D, VU,NR by  at 9/1/2021 1520    Acceptance, E,D, VU,NR by  at 8/31/2021 1541    Acceptance, E, VU,NR by  at 8/30/2021 1507    Acceptance, E,TB, VU by BEVERLY at 8/28/2021 2040    Acceptance, E,D, VU,NR by AB at 8/28/2021 1410                   Point: Home exercise program (Done)     Description:   Instruct learner(s) on appropriate technique for monitoring, assisting and/or progressing therapeutic exercises/activities.              Learning Progress Summary           Patient Acceptance, E,TB, VU by BEVERLY at 9/2/2021 2003    Acceptance, E,TB, VU by  at 9/1/2021 2256    Acceptance, E,TB, VU by  at 8/28/2021 2040                   Point: Precautions (Done)     Description:   Instruct learner(s) on prescribed precautions during self-care and functional transfers.              Learning Progress Summary           Patient Acceptance, E,TB, VU by BEVERLY at 9/2/2021 2003    Acceptance, E,D, VU,NR by  at 9/2/2021 1458    Acceptance, E,TB, VU by BEVERLY at 9/1/2021 2256    Acceptance, E,D, VU,NR by  at 9/1/2021 1520     Acceptance, E,D, VU,NR by CJ at 8/31/2021 1541    Acceptance, E, VU,NR by BF at 8/30/2021 1507    Acceptance, E,TB, VU by DG at 8/28/2021 2040    Acceptance, E,D, VU,NR by AB at 8/28/2021 1410                               User Key     Initials Effective Dates Name Provider Type Discipline    AB 06/16/21 -  Yazmin Chaves, OT Occupational Therapist OT    DG 06/16/21 -  Melissa Swift, RN Registered Nurse Nurse     06/16/21 -  Debbie Mehta, OT Occupational Therapist OT    CJ 06/16/21 -  Lucrecia Andrews, OT Occupational Therapist OT                    OT Recommendation and Plan                         Time Calculation:     Time Calculation- OT     Row Name 09/14/21 1438             Time Calculation- OT    OT Start Time  0745  -      OT Stop Time  0915  -      OT Time Calculation (min)  90 min  -        User Key  (r) = Recorded By, (t) = Taken By, (c) = Cosigned By    Initials Name Provider Type     Maria Guadalupe Monge, OT Occupational Therapist        Therapy Charges for Today     Code Description Service Date Service Provider Modifiers Qty    86035390044 HC OT SELF CARE/MGMT/TRAIN EA 15 MIN 9/13/2021 Maria Guadalupe Monge OT GO, KX 2    18266547266 HC OT THER PROC EA 15 MIN 9/13/2021 Maria Guadalupe Monge OT GO, KX 1    29116939080 HC OT NEUROMUSC RE EDUCATION EA 15 MIN 9/13/2021 Maria Guadalupe Monge OT GO, KX 3    80072682874 HC OT SELF CARE/MGMT/TRAIN EA 15 MIN 9/14/2021 Maria Guadalupe Monge OT GO, KX 2    71492611029 HC OT NEUROMUSC RE EDUCATION EA 15 MIN 9/14/2021 Maria Guadalupe Monge OT GO, KX 3    93014927300 HC OT ELEC STIM EA-PER 15 MIN 9/14/2021 Maria Guadalupe Monge OT GO, KX 1                   Maria Guadalupe Monge, OT  9/14/2021

## 2021-09-14 NOTE — SIGNIFICANT NOTE
09/14/21 1100   Plan   Plan Team conference held today.  Spoke to pt about recommendation to extend discharge date to 10-1-21 to allow continued progress in therapy.  Pt wants to go home at discharge with family providing assistance if possible.  Pt aware of options for continued rehab including home health services, outpatient therapy or SNF/swing bed.  Pt agreeable to continued rehab stay.   Patient/Family in Agreement with Plan yes

## 2021-09-14 NOTE — PLAN OF CARE
Goal Outcome Evaluation:        Problem: Swallowing Impairment  Goal: Optimal Oral Motor and Swallow Ability  Outcome: Met     Problem: Communication Impairment  Goal: Effective Communication Skills  Outcome: Met

## 2021-09-14 NOTE — SIGNIFICANT NOTE
09/14/21 1100   Plan   Plan Team conference held today.  Spoke to pt about recommendation to extend discharge date to 10-1-21 to allow continued progress in therapy.  Pt wants to go home at discharge with family providing assistance if possible.  Pt aware of options for continued rehab including home health services, outpatient therapy or SNF/swing bed.  Pt agreeable to continued rehab stay and prefers to go to outpatient therapy at discharge.   Patient/Family in Agreement with Plan yes

## 2021-09-14 NOTE — PROGRESS NOTES
Occupational Therapy:    Physical Therapy:    Speech Language Pathology: Individual: 47 minutes.    Signed by: MEET Chen

## 2021-09-14 NOTE — THERAPY TREATMENT NOTE
Inpatient Rehabilitation - Speech Language Pathology   Swallow Treatment Note [facility] [place]     Patient Name: Joycelyn Brown  : 1940  MRN: [id number]  Today's Date: 2021               Admit Date: 2021    Visit Dx:     ICD-10-CM ICD-9-CM   1. CVA (cerebral vascular accident) (CMS/[facility])  I63.9 434.91     Patient Active Problem List   Diagnosis   • CVA (cerebral vascular accident) (CMS/[facility])     History reviewed. No pertinent past medical history.  No past surgical history on file.    Dysphagia Tx Note:      Ms. Brown is seen in speech therapy office of inpatient physical rehabilitation this date for formal dysphagia tx.  She is cooperative to participate on this date.  She is s/p participation w/ PT on this am.      Long Term Goal:  Pt will accept least restrictive diet tolerance w/o overt s/s aspiration.      Short Term Goals:  1. Pt will tolerate facial massage to R  Facial surface for 3-7 minutes to increase surface blood flow, sensation and ROM over 3 consecutive sessions.   *AM: R mechanical facial massage for 7 minutes w/ mildly increased surface blood flow.   GOAL MET    2. Pt will perform OROM/MONAE exercises x3 sets x10 reps w/ min cues.  *AM: x10 sets x10 reps w/ min cues   GOAL MET    3. Pt will perform resistive breathing exercises x3 sets x5 reps w/resistance of 2-5  To improve respiratory support and control.   *GOAL MET    4. Pt will perform laryngeal adduction/elevation exercises x3 sets x10 reps w/ min cues.   *GOAL MET    5. Pt will perform Shaker exercises sustained x3 sets x5 reps for 30+ seconds over 3 consecutive sessions.   *GOAL MET    6. Pt will perform Shaker exercises repetitive x3 sets x12 reps w/ mod cues.   *GOAL MET      7. Pt will perform compensatory techniques during meals w/ min cues.  *PM: Pt reports no difficulty w/ midday meal      8. Pt will participate in instrumental re-evaluation of swallowing fnx in 7-10 days, pending progress towards this poc.  *GOAL MET    Thank  you-  Maryam Jones M.S., SERENITY-SLP      -------------------------------------------------------------------------------------------------------      DYSARTHRIA THERAPY PLAN OF CARE:     Ms. Brown is seen in speech therapy office of inpatient physical rehabilitation this date for formal dysphagia tx.  She is cooperative to participate on this date.  She is s/p participation w/ PT on this date.        Long Term Goal:  Pt will improve motor speech to allow for maximal communication and safety in adls.      Short Term Goals:  1. Pt will tolerate facial massage to R dacial surface for 3-7 minutes to increase surface blood flow, sensation and ROM over 3 consecutive sessions.   *AM: R mechanical facial massage for 7 Minutes w/ mildly increased surface blood flow.   GOAL MET    2. Pt will perform OROM/MONAE exercises x3 sets x10 reps w/ min cues.  *x3 sets x10 reps w/ mod cues.   *AM: x10 sets x10 reps w/ min cues   GOAL MET    3. Pt will perform resistive breathing exercises x3 sets x5 reps w/resistance of 2-5 to improve respiratory support and control.   *GOAL MET    4. Pt will accurately produce multi-syllabic words 4/5 opp w/ min cues over 3 consecutive sessions.   *AM: 4/5 opp w/ min cues  GOAL MET    5. Pt will overarticulate and hyperphonate in connected speech 4/5 opp w/ min cues over 3 consecutive sessions.   *AM: 4/5 opp w/ min cues in general conversation   GOAL MET    6. Pt will improve intelligibility to 100% for both known/unknown context in conversation w/ min cues over 3 consecutive sessions.   *AM: approx 100% intelligible w/ unknown context   GOAL MET     *Goals to be added/modified as necessary.      Thank you-  Maryam Jones M.S., CCC-SLP        SLP Recommendation and Plan  Continue modified po diet and tx per poc.     EDUCATION  The patient has been educated in the following areas:   Dysphagia (Swallowing Impairment) Modified Diet Instruction.     Time Calculation:   Time Calculation- SLP     Row Name  09/14/21 1057             Time Calculation- SLP    SLP Start Time  1000  -JR      SLP Stop Time  1047  -JR      SLP Time Calculation (min)  47 min  -JR        User Key  (r) = Recorded By, (t) = Taken By, (c) = Cosigned By    Initials Name Provider Type    Maryam Adams MS, CFY-SLP Speech and Language Pathologist        Therapy Charges for Today     Code Description Service Date Service Provider Modifiers Qty    05254633084 HC ST TREATMENT SPEECH 1 9/13/2021 Maryam Jones MS, CFY-SLP GN, KX 1    79580858397 HC ST TREATMENT SWALLOW 2 9/13/2021 Maryam Jones MS, CFY-SLP GN, KX 1    14442186580 HC ST TREATMENT SPEECH 1 9/13/2021 Maryam Jones MS, CFY-SLP GN, KX 1    [id number] HC ST TREATMENT SWALLOW 2 9/13/2021 Maryam Jones MS, CFY-SLP GN, KX 1    52495911186 HC ST TREATMENT SPEECH 2 9/14/2021 Maryam Jones MS, CFY-SLP GN, KX 1    38601148496 HC ST TREATMENT SWALLOW 1 9/14/2021 Maryam Jones MS, CFY-SLP GN, KX 1        Patient was not wearing a face mask during this therapy encounter. Therapist used appropriate personal protective equipment including mask, eye protection and gloves.  Mask used was standard procedure mask. Appropriate PPE was worn during the entire therapy session. The patient did not cough during this evaluation. Therapist was within 6 feet for 15 minutes or more during the evaluation. Hand hygiene was completed before and after therapy session. Patient is not in enhanced droplet precautions.     Maryam Robert, MS, LOVE-SLP  9/14/2021

## 2021-09-14 NOTE — THERAPY TREATMENT NOTE
Inpatient Rehabilitation - Physical Therapy Treatment Note        Rte     Patient Name: Joycelyn Brown  : 1940  MRN: [id number]    Today's Date: 2021                    Admit Date: 2021      Visit Dx:     ICD-10-CM ICD-9-CM   1. CVA (cerebral vascular accident) (CMS/HCC)  I63.9 434.91       Patient Active Problem List   Diagnosis   • CVA (cerebral vascular accident) (CMS/HCC)       History reviewed. No pertinent past medical history.    No past surgical history on file.       PT ASSESSMENT (last 12 hours)      IRF PT Evaluation and Treatment     Row Name 21 1604          PT Time and Intention    Document Type  daily treatment  -RF     Mode of Treatment  physical therapy;individual therapy  -RF     Patient/Family/Caregiver Comments/Observations  Patient c/o weakness and fatigue with all activities this date.   -RF     Row Name 21 1604          General Information    Patient Profile Reviewed  yes  -RF     Existing Precautions/Restrictions  fall;oxygen therapy device and L/min R HP  -RF     Row Name 21 1604          Cognition/Psychosocial    Affect/Mental Status (Cognitive)  WFL  -RF     Follows Commands (Cognition)  WFL  -RF     Row Name 21 1604          Pain Scale: FACES Pre/Post-Treatment    Pain: FACES Scale, Pretreatment  0-->no hurt  -RF     Posttreatment Pain Rating  0-->no hurt  -RF     Row Name 21 1604          Bed Mobility    Supine-Sit-Supine [place] (Bed Mobility)  maximum assist (25% patient effort);verbal cues;nonverbal cues (demo/gesture)  -RF     Assistive Device (Bed Mobility)  bed rails  -RF     Row Name 21 1604          Transfers    Bed-Chair [place] (Transfers)  maximum assist (25% patient effort);verbal cues;nonverbal cues (demo/gesture)  -RF     Chair-Bed [place] (Transfers)  maximum assist (25% patient effort);verbal cues;nonverbal cues (demo/gesture)  -RF     Assistive Device (Bed-Chair Transfers)  wheelchair  -RF      Sit-Stand [place] (Transfers)  moderate assist (50% patient effort);maximum assist (25% patient effort);verbal cues;nonverbal cues (demo/gesture);other (see comments) mod // bars  -RF     Stand-Sit [place] (Transfers)  moderate assist (50% patient effort);maximum assist (25% patient effort);verbal cues;nonverbal cues (demo/gesture);other (see comments) mod // bars  -RF     Row Name 09/14/21 1604          Chair-Bed Transfer    Assistive Device (Chair-Bed Transfers)  wheelchair  -RF     Row Name 09/14/21 1604          Sit-Stand Transfer    Assistive Device (Sit-Stand Transfers)  -- PB  -RF     Row Name 09/14/21 1604          Stand-Sit Transfer    Assistive Device (Stand-Sit Transfers)  -- PB  -RF     Row Name 09/14/21 1604          Stand Pivot/Stand Step Transfer    Stand Pivot/Stand Step [place] (Transfers)  maximum assist (25% patient effort);2 person assist  -RF     Assistive Device (Stand Pivot Stand Step Transfer)  wheelchair  -RF     Row Name 09/14/21 1604          Gait/Stairs (Locomotion)    [place] Level (Gait)  2 person assist;moderate assist (50% patient effort)  -RF     Assistive Device (Gait)  walker, jesusita;other (see comments) parallel bars  -RF     Distance in Feet (Gait)  6'X3  -RF     Pattern (Gait)  step-to  -RF     Bilateral Gait Deviations  forward flexed posture;weight shift ability decreased  -RF     Right Sided Gait Deviations  knee buckling, right side;weight shift ability decreased  -RF     Comment (Gait/Stairs)  Pt conitnues to require assistance and cuing for upright posture, weightshifting, and proper technique.   -RF     Row Name 09/14/21 1604          Safety Issues, Functional Mobility    Impairments Affecting Function (Mobility)  balance;endurance/activity tolerance;muscle tone abnormal;postural/trunk control;strength  -RF     Row Name 09/14/21 1604          Hip (Therapeutic Exercise)    Hip Strengthening (Therapeutic Exercise)   bilateral;flexion;extension;aBduction;aDduction;marching while seated;sitting;resistance band;yellow;2 sets;10 repetitions;other (see comments) Improving R hip activation noted; unable to produce AROM  -RF     Row Name 09/14/21 1604          Knee (Therapeutic Exercise)    Knee Strengthening (Therapeutic Exercise)  bilateral;flexion;extension;marching while seated;LAQ (long arc quad);hamstring curls;sitting;resistance band;yellow;2 sets;10 repetitions  -RF     Row Name 09/14/21 1604          Ankle (Therapeutic Exercise)    Ankle Strengthening (Therapeutic Exercise)  bilateral;dorsiflexion;plantarflexion;sitting;2 sets;10 repetitions;other (see comments) AROM noted on R  -RF     Row Name 09/14/21 1604          [place] Electrical Stimulation Treatment    Location 1 ([place] E-Stim Treatment)  lower extremity treatment area;other (see comments) R hip flexors  -RF     Indications ([place] E-Stim Treatment)  enhance muscle contraction  -RF     Treatment Details ([place] Electrical Stimulation Treatment)  15 mins, 33 mA, 10/20  -RF     Comment ([place] E-Stim Treatment)  Pt cued for AROM hip flexion; AAROM required to produce joint motion.   -RF     Row Name 09/14/21 1604          Bed Mobility Goal 1 (PT-IRF)    Activity/Assistive Device (Bed Mobility Goal 1, PT-IRF)  bed mobility activities, all  -RF     [place] Level (Bed Mobility Goal 1, PT-IRF)  standby assist  -RF     Time Frame (Bed Mobility Goal 1, PT-IRF)  long-term goal (LTG)  -RF     Progress/Outcomes (Bed Mobility Goal 1, PT-IRF)  goal ongoing  -RF     Row Name 09/14/21 1604          Transfer Goal 1 (PT-IRF)    Activity/Assistive Device (Transfer Goal 1, PT-IRF)  all transfers  -RF     [place] Level (Transfer Goal 1, PT-IRF)  minimum assist (75% or more patient effort)  -RF     Time Frame (Transfer Goal 1, PT-IRF)  by discharge  -RF     Progress/Outcomes (Transfer Goal 1, PT-IRF)  goal ongoing  -RF     Row Name 09/14/21 1604          Gait/Walking  Locomotion Goal 1 (PT-IRF)    Activity/Assistive Device (Gait/Walking Locomotion Goal 1, PT-IRF)  gait (walking locomotion);assistive device use  -RF     Gait/Walking Locomotion Distance Goal 1 (PT-IRF)  5  -RF     [place] Level (Gait/Walking Locomotion Goal 1, PT-IRF)  minimum assist (75% or more patient effort)  -RF     Time Frame (Gait/Walking Locomotion Goal 1, PT-IRF)  long-term goal (LTG)  -RF     Progress/Outcomes (Gait/Walking Locomotion Goal 1, PT-IRF)  goal ongoing  -RF     Row Name 09/14/21 1604          Stairs Goal 1 (PT-IRF)    Activity/Assistive Device (Stairs Goal 1, PT-IRF)  stairs, all skills;ascending stairs;descending stairs;using handrail, left  -RF     Number of Stairs (Stairs Goal 1, PT-IRF)  5  -RF     [place] Level (Stairs Goal 1, PT-IRF)  minimum assist (75% or more patient effort);moderate assist (50-74% patient effort)  -RF     Time Frame (Stairs Goal 1, PT-IRF)  long-term goal (LTG)  -RF     Progress/Outcomes (Stairs Goal 1, PT-IRF)  goal ongoing  -RF     Row Name 09/14/21 1604          Positioning and Restraints    Pre-Treatment Position  sitting in chair/recliner AM, bed in PM  -RF     In Bed  supine;call light within reach;encouraged to call for assist PM  -RF     In Wheelchair  sitting;with SLP AM  -RF     Row Name 09/14/21 1604          Therapy Assessment/Plan (PT)    Rehab Potential/Prognosis (PT)  adequate, monitor progress closely  -RF     Frequency of Treatment (PT)  5 times per week  -RF     Estimated Duration of Therapy (PT)  2 weeks  -RF     Problem List (PT)  problems related to;balance;hemiparesis/hemiplegia;mobility;strength  -RF     Row Name 09/14/21 1604          Daily Progress Summary (PT)    Functional Goal Overall Progress (PT)  progressing toward functional goals as expected  -RF     Daily Progress Summary (PT)  Patient continues to require significant cuing and assistance with gait training. Continued skilled care required to ensure maximum safe function  upon D/C.   -RF     Impairments Still Limiting Function (PT)  sensation decreased;strength decreased;impaired balance;coordination impaired;impaired functional activity tolerance;motor control impaired;postural control impaired;pain  -RF     Recommendations (PT)  Continue per current POC.   -RF       User Key  (r) = Recorded By, (t) = Taken By, (c) = Cosigned By    Initials Name Provider Type    RF Malinda Alicea PTA Physical Therapy Assistant           Physical Therapy Education                 Title: PT OT SLP Therapies (Done)     Topic: Physical Therapy (Done)     Point: Mobility training (Done)     Learning Progress Summary           Patient Acceptance, E,TB, VU by RF at 9/14/2021 1614    Acceptance, E, VU,NR by LB at 9/13/2021 1121    Acceptance, E,TB, VU by RF at 9/10/2021 1556    Acceptance, E,TB, VU by RF at 9/9/2021 1551    Acceptance, E,TB, VU by RF at 9/8/2021 1602    Acceptance, E,TB, VU by RF at 9/7/2021 1605    Acceptance, E,TB, VU by RF at 9/6/2021 1557    Acceptance, E,TB, VU by RF at 9/3/2021 1532    Acceptance, E,TB, VU by DG at 9/2/2021 2003    Acceptance, E,TB, VU by RF at 9/2/2021 1610    Acceptance, E,TB, VU by DG at 9/1/2021 2256    Acceptance, E,TB, VU by RF at 9/1/2021 1548    Acceptance, E,TB, VU by RF at 8/31/2021 1423    Acceptance, E,TB, VU by RF at 8/30/2021 1548                   Point: Home exercise program (Done)     Learning Progress Summary           Patient Acceptance, E,TB, VU by RF at 9/14/2021 1614    Acceptance, E, VU,NR by LB at 9/13/2021 1121    Acceptance, E,TB, VU by RF at 9/10/2021 1556    Acceptance, E,TB, VU by RF at 9/9/2021 1551    Acceptance, E,TB, VU by RF at 9/8/2021 1602    Acceptance, E,TB, VU by RF at 9/7/2021 1605    Acceptance, E,TB, VU by RF at 9/6/2021 1557    Acceptance, E,TB, VU by RF at 9/3/2021 1532    Acceptance, E,TB, VU by DG at 9/2/2021 2003    Acceptance, E,TB, VU by RF at 9/2/2021 1610    Acceptance, E,TB, VU by DG at 9/1/2021 2253     Acceptance, E,TB, VU by RF at 9/1/2021 1548    Acceptance, E,TB, VU by RF at 8/31/2021 1423    Acceptance, E,TB, VU by RF at 8/30/2021 1548                   Point: Body mechanics (Done)     Learning Progress Summary           Patient Acceptance, E,TB, VU by RF at 9/14/2021 1614    Acceptance, E, VU,NR by LB at 9/13/2021 1121    Acceptance, E,TB, VU by RF at 9/10/2021 1556    Acceptance, E,TB, VU by RF at 9/9/2021 1551    Acceptance, E,TB, VU by RF at 9/8/2021 1602    Acceptance, E,TB, VU by RF at 9/7/2021 1605    Acceptance, E,TB, VU by RF at 9/6/2021 1557    Acceptance, E,TB, VU by RF at 9/3/2021 1532    Acceptance, E,TB, VU by DG at 9/2/2021 2003    Acceptance, E,TB, VU by RF at 9/2/2021 1610    Acceptance, E,TB, VU by DG at 9/1/2021 2256    Acceptance, E,TB, VU by RF at 9/1/2021 1548    Acceptance, E,TB, VU by RF at 8/31/2021 1423    Acceptance, E,TB, VU by RF at 8/30/2021 1548                   Point: Precautions (Done)     Learning Progress Summary           Patient Acceptance, E,TB, VU by RF at 9/14/2021 1614    Acceptance, E, VU,NR by LB at 9/13/2021 1121    Acceptance, E,TB, VU by RF at 9/10/2021 1556    Acceptance, E,TB, VU by RF at 9/9/2021 1551    Acceptance, E,TB, VU by RF at 9/8/2021 1602    Acceptance, E,TB, VU by RF at 9/7/2021 1605    Acceptance, E,TB, VU by RF at 9/6/2021 1557    Acceptance, E,TB, VU by RF at 9/3/2021 1532    Acceptance, E,TB, VU by DG at 9/2/2021 2003    Acceptance, E,TB, VU by RF at 9/2/2021 1610    Acceptance, E,TB, VU by  at 9/1/2021 2256    Acceptance, E,TB, VU by RF at 9/1/2021 1548    Acceptance, E,TB, VU by RF at 8/31/2021 1423    Acceptance, E,TB, VU by RF at 8/30/2021 1548                               User Key     Initials Effective Dates Name Provider Type Discipline    DG 06/16/21 -  Melissa Swift RN Registered Nurse Nurse     06/16/21 -  Anju Reis, PT Physical Therapist PT    RF 06/16/21 -  Malinad Alicea PTA Physical Therapy Assistant PT                 PT Recommendation and Plan    Frequency of Treatment (PT): 5 times per week     Daily Progress Summary (PT)  Functional Goal Overall Progress (PT): progressing toward functional goals as expected  Daily Progress Summary (PT): Patient continues to require significant cuing and assistance with gait training. Continued skilled care required to ensure maximum safe function upon D/C.   Impairments Still Limiting Function (PT): sensation decreased, strength decreased, impaired balance, coordination impaired, impaired functional activity tolerance, motor control impaired, postural control impaired, pain  Recommendations (PT): Continue per current POC.                Time Calculation:     PT Charges     Row Name 09/14/21 1616 09/14/21 1615          Time Calculation    Start Time  1245  -RF  0915  -RF     Stop Time  1330  -RF  1000  -RF     Time Calculation (min)  45 min  -RF  45 min  -RF     PT Received On  09/14/21  -RF  09/14/21  -RF     PT - Next Appointment  09/15/21  -RF  09/14/21  -RF     PT Goal Re-Cert Due Date  09/17/21  -RF  09/17/21  -RF        Time Calculation- PT    Total Timed Code Minutes- PT  45 minute(s)  -RF  45 minute(s)  -RF       User Key  (r) = Recorded By, (t) = Taken By, (c) = Cosigned By    Initials Name Provider Type    RF Malinda Alicea PTA Physical Therapy Assistant          Therapy Charges for Today     Code Description Service Date Service Provider Modifiers Qty    96260773099 HC GAIT TRAINING EA 15 MIN 9/14/2021 Malinda Alicea PTA GP, KX 2    75263456664 HC PT THERAPEUTIC ACT EA 15 MIN 9/14/2021 Malinda Alicea PTA GP, KX 2    86518785849 HC PT NEUROMUSC RE EDUCATION EA 15 MIN 9/14/2021 Malinda Alicea PTA GP, KX 2                   Malinda Alicea PTA  9/14/2021

## 2021-09-14 NOTE — PROGRESS NOTES
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  I10 Essential (primary) hypertension  E78.5 Hyperlipidemia, unspecified  E03.9 Hypothyroidism, unspecified  K21.9 Gastro-esophageal reflux disease without esophagitis  K59.00 Constipation, unspecified  F32.9 Major depressive disorder, single episode, unspecified  Z87.440 Personal history of urinary (tract) infections  Z87.01 Personal history of pneumonia (recurrent)      Plan of Care  Anticipated Discharge Date/Estimated Length of Stay: 9-24-21  Anticipated Discharge Destination: Community discharge with assistance  Discharge Plan : Pt plans to return home with family providing assistance if  needed.  Medical Necessity Expected Level Rationale: fair  Intensity and Duration: an average of 3 hours/5 days per week  Medical Supervision and 24 Hour Rehab Nursing: x  Physical Therapy: x  PT Intensity/Duration: PT 1-1.5 hours per day/5 days per week  Occupational Therapy: x  OT Intensity/Duration: OT 1-1.5 hours per day/5 days per week  Speech and Language Therapy: x  SLP Intensity/Duration: SLP 30 mins-90 mins per day/5 days per week  Orthotics/Prosthetics: x  Social Work: x  Therapeutic Recreation: x  Updated (if changes indicated)    Anticipated Discharge Date/Estimated Length of Stay:   10-1-21      Discharge Plan of Care:    Based on the patient's medical and functional status, their prognosis  and  expected level of functional improvement is: fair      Interdisciplinary Problem/Goals/Status  Communication    [ST] Dysarthria(Active)  Current Status(09/14/2021): Pt presents w/ a mild dysarthria negatively  impacting communication in adls.  Weekly Goal(09/20/2021): R facial massage  Discharge Goal: Pt will improve motor speech to allow for maximal commmuncation  in adls.        Mobility    [PT] Bed Mobility(Active)  Current Status(08/28/2021): ModA  Weekly Goal(09/04/2021): ModA/Alyssa  Discharge Goal: Alyssa    [PT] Transfers(Active)  Current Status(08/28/2021): Max/Dep  Weekly Goal(09/04/2021): ModA/MaxA  Discharge Goal: ModA    [PT] Walk(Active)  Current Status(08/28/2021): N/A  Weekly Goal(09/04/2021): 2 Steps  Discharge Goal: 5 feet ModA    [PT] Stairs(Active)  Current Status(08/28/2021): N/A  Weekly Goal(09/04/2021): 1 step  Discharge Goal: 5 steps min/modA        Pain    [RN] Pain Management(Active)  Current Status(08/27/2021): potential for pain  Weekly Goal(09/24/2021): No pain this week  Discharge Goal: No pain        Safety    [RN] Potential for Injury(Active)  Current Status(08/27/2021): At risk for injury  Weekly Goal(09/24/2021): No injury this week  Discharge Goal: No injuries        Self Care    [OT] Dressing (Upper)(Active)  Current Status(09/13/2021): max  Weekly Goal(09/14/2021): mod  Discharge Goal: ModA        Swallow Function    [ST] Swallowing(Active)  Current Status(09/14/2021): Nectar thick liquids  Weekly Goal(09/20/2021): Tolerate facial massage to R facial surface  Discharge Goal: Least restrictive po diet    Comments: Recommend extending rehab stay another week to allow continued  progress in therapy.  Pt plans to return home with family providing assistance  if possible at discharge.    Signed by: LATONYA Hector    Physician CoSigned By: Joselito Hernandez 09/14/2021 12:41:54

## 2021-09-15 LAB
GLUCOSE BLDC GLUCOMTR-MCNC: 189 MG/DL (ref 70–130)
GLUCOSE BLDC GLUCOMTR-MCNC: 246 MG/DL (ref 70–130)
GLUCOSE BLDC GLUCOMTR-MCNC: 247 MG/DL (ref 70–130)
GLUCOSE BLDC GLUCOMTR-MCNC: 248 MG/DL (ref 70–130)

## 2021-09-15 PROCEDURE — 97032 APPL MODALITY 1+ESTIM EA 15: CPT | Performed by: OCCUPATIONAL THERAPIST

## 2021-09-15 PROCEDURE — 63710000001 INSULIN ASPART PER 5 UNITS: Performed by: INTERNAL MEDICINE

## 2021-09-15 PROCEDURE — 97110 THERAPEUTIC EXERCISES: CPT

## 2021-09-15 PROCEDURE — 97535 SELF CARE MNGMENT TRAINING: CPT | Performed by: OCCUPATIONAL THERAPIST

## 2021-09-15 PROCEDURE — 97112 NEUROMUSCULAR REEDUCATION: CPT | Performed by: OCCUPATIONAL THERAPIST

## 2021-09-15 PROCEDURE — 97112 NEUROMUSCULAR REEDUCATION: CPT

## 2021-09-15 PROCEDURE — 63710000001 INSULIN DETEMIR PER 5 UNITS: Performed by: INTERNAL MEDICINE

## 2021-09-15 PROCEDURE — 82962 GLUCOSE BLOOD TEST: CPT

## 2021-09-15 PROCEDURE — 94799 UNLISTED PULMONARY SVC/PX: CPT

## 2021-09-15 PROCEDURE — 97530 THERAPEUTIC ACTIVITIES: CPT

## 2021-09-15 RX ADMIN — AMLODIPINE BESYLATE 10 MG: 10 TABLET ORAL at 08:54

## 2021-09-15 RX ADMIN — CARVEDILOL 12.5 MG: 6.25 TABLET, FILM COATED ORAL at 08:54

## 2021-09-15 RX ADMIN — INSULIN DETEMIR 20 UNITS: 100 INJECTION, SOLUTION SUBCUTANEOUS at 21:47

## 2021-09-15 RX ADMIN — KETOTIFEN FUMARATE 1 DROP: 0.35 SOLUTION/ DROPS OPHTHALMIC at 17:11

## 2021-09-15 RX ADMIN — APIXABAN 5 MG: 5 TABLET, FILM COATED ORAL at 21:05

## 2021-09-15 RX ADMIN — INSULIN ASPART 2 UNITS: 100 INJECTION, SOLUTION INTRAVENOUS; SUBCUTANEOUS at 08:54

## 2021-09-15 RX ADMIN — KETOTIFEN FUMARATE 1 DROP: 0.35 SOLUTION/ DROPS OPHTHALMIC at 21:06

## 2021-09-15 RX ADMIN — SERTRALINE 50 MG: 50 TABLET, FILM COATED ORAL at 08:53

## 2021-09-15 RX ADMIN — ATORVASTATIN CALCIUM 80 MG: 40 TABLET, FILM COATED ORAL at 21:05

## 2021-09-15 RX ADMIN — Medication 1000 MCG: at 08:53

## 2021-09-15 RX ADMIN — INSULIN DETEMIR 20 UNITS: 100 INJECTION, SOLUTION SUBCUTANEOUS at 09:00

## 2021-09-15 RX ADMIN — PANTOPRAZOLE SODIUM 40 MG: 40 TABLET, DELAYED RELEASE ORAL at 05:31

## 2021-09-15 RX ADMIN — Medication 10 MG: at 21:05

## 2021-09-15 RX ADMIN — LINAGLIPTIN 5 MG: 5 TABLET, FILM COATED ORAL at 08:53

## 2021-09-15 RX ADMIN — Medication 1 TABLET: at 08:53

## 2021-09-15 RX ADMIN — INSULIN ASPART 3 UNITS: 100 INJECTION, SOLUTION INTRAVENOUS; SUBCUTANEOUS at 12:18

## 2021-09-15 RX ADMIN — CHLORTHALIDONE 25 MG: 50 TABLET ORAL at 08:53

## 2021-09-15 RX ADMIN — APIXABAN 5 MG: 5 TABLET, FILM COATED ORAL at 08:54

## 2021-09-15 RX ADMIN — INSULIN ASPART 3 UNITS: 100 INJECTION, SOLUTION INTRAVENOUS; SUBCUTANEOUS at 17:11

## 2021-09-15 RX ADMIN — OXYCODONE HYDROCHLORIDE AND ACETAMINOPHEN 500 MG: 500 TABLET ORAL at 08:53

## 2021-09-15 RX ADMIN — KETOTIFEN FUMARATE 1 DROP: 0.35 SOLUTION/ DROPS OPHTHALMIC at 08:54

## 2021-09-15 RX ADMIN — AMLODIPINE BESYLATE 10 MG: 10 TABLET ORAL at 21:05

## 2021-09-15 RX ADMIN — LEVOTHYROXINE SODIUM 50 MCG: 50 TABLET ORAL at 05:31

## 2021-09-15 NOTE — PLAN OF CARE
Goal Outcome Evaluation:               Pt is progressing medically and physically with all therapies, continue current plan of care.

## 2021-09-15 NOTE — THERAPY TREATMENT NOTE
Inpatient Rehabilitation - Physical Therapy Treatment Note       [facility]     Patient Name: Joycelyn Brown  : 1940  MRN: [id number]    Today's Date: 9/15/2021                    Admit Date: 2021      Visit Dx:     ICD-10-CM ICD-9-CM   1. CVA (cerebral vascular accident) (CMS/HCC)  I63.9 434.91       Patient Active Problem List   Diagnosis   • CVA (cerebral vascular accident) (CMS/Former[facility])       History reviewed. No pertinent past medical history.    No past surgical history on file.       PT ASSESSMENT (last 12 hours)      IRF PT Evaluation and Treatment     Row Name 09/15/21 155          PT Time and Intention    Document Type  daily treatment  -RF     Mode of Treatment  physical therapy;individual therapy  -RF     Patient/Family/Caregiver Comments/Observations  No significant c/o noted this date.   -RF     Row Name 09/15/21 155          General Information    Patient Profile Reviewed  yes  -RF     Existing Precautions/Restrictions  fall;oxygen therapy device and L/min R HP  -RF     Row Name 09/15/21 155          Cognition/Psychosocial    Affect/Mental Status (Cognitive)  WFL  -RF     Follows Commands (Cognition)  WFL  -RF     Row Name 09/15/21 155          Pain Scale: FACES Pre/Post-Treatment    Pain: FACES Scale, Pretreatment  0-->no hurt  -RF     Posttreatment Pain Rating  0-->no hurt  -RF     Row Name 09/15/21 155          Bed Mobility    Assistive Device (Bed Mobility)  bed rails  -RF     Row Name 09/15/21 155          Safety Issues, Functional Mobility    Impairments Affecting Function (Mobility)  balance;endurance/activity tolerance;muscle tone abnormal;postural/trunk control;strength  -RF     Row Name 09/15/21 155          Balance    Dynamic Sitting Balance  mild impairment;unsupported;sitting in chair;asymmetrical weight shifting;other (see comments) bag toss with reach outside BRENDA to R  -RF     Row Name 09/15/21 155          Hip (Therapeutic Exercise)    Hip Strengthening (Therapeutic  Exercise)  bilateral;flexion;extension;aBduction;aDduction;external rotation;internal rotation;heel slides;marching while seated;supine;resistance band;red;2 sets;10 repetitions;other (see comments) AAROM required for RLE  -RF     Row Name 09/15/21 1557          Knee (Therapeutic Exercise)    Knee Strengthening (Therapeutic Exercise)  bilateral;flexion;extension;heel slides;marching while seated;SLR (straight leg raise);SAQ (short arc quad);LAQ (long arc quad);hamstring curls;sitting;supine;2 sets;10 repetitions;resistance band;red;other (see comments) AAROM required for RLE  -RF     Row Name 09/15/21 1557          Ankle (Therapeutic Exercise)    Ankle Strengthening (Therapeutic Exercise)  bilateral;dorsiflexion;plantarflexion;sitting;supine;2 sets;10 repetitions;other (see comments) AAROM required for RLE  -RF     Row Name 09/15/21 1557          Kazakh Electrical Stimulation Treatment    Location 1 (Russian E-Stim Treatment)  lower extremity treatment area;other (see comments) R hip flexors  -RF     Indications (Russian E-Stim Treatment)  enhance muscle contraction  -RF     Treatment Details (Russian Electrical Stimulation Treatment)  10 mins, 40mA, 10/20  -RF     Comment (Kazakh E-Stim Treatment)  Pt cued for HS with on cycle  -RF     Row Name 09/15/21 1557          Bed Mobility Goal 1 (PT-IRF)    Activity/Assistive Device (Bed Mobility Goal 1, PT-IRF)  bed mobility activities, all  -RF     Wayne Level (Bed Mobility Goal 1, PT-IRF)  standby assist  -RF     Time Frame (Bed Mobility Goal 1, PT-IRF)  long-term goal (LTG)  -RF     Progress/Outcomes (Bed Mobility Goal 1, PT-IRF)  goal ongoing  -RF     Row Name 09/15/21 1557          Transfer Goal 1 (PT-IRF)    Activity/Assistive Device (Transfer Goal 1, PT-IRF)  all transfers  -RF     Wayne Level (Transfer Goal 1, PT-IRF)  minimum assist (75% or more patient effort)  -RF     Time Frame (Transfer Goal 1, PT-IRF)  by discharge  -RF     Progress/Outcomes  (Transfer Goal 1, PT-IRF)  goal ongoing  -RF     Row Name 09/15/21 1557          Gait/Walking Locomotion Goal 1 (PT-IRF)    Activity/Assistive Device (Gait/Walking Locomotion Goal 1, PT-IRF)  gait (walking locomotion);assistive device use  -RF     Gait/Walking Locomotion Distance Goal 1 (PT-IRF)  5  -RF     Falls Of Rough Level (Gait/Walking Locomotion Goal 1, PT-IRF)  minimum assist (75% or more patient effort)  -RF     Time Frame (Gait/Walking Locomotion Goal 1, PT-IRF)  long-term goal (LTG)  -RF     Progress/Outcomes (Gait/Walking Locomotion Goal 1, PT-IRF)  goal ongoing  -RF     Row Name 09/15/21 1557          Stairs Goal 1 (PT-IRF)    Activity/Assistive Device (Stairs Goal 1, PT-IRF)  stairs, all skills;ascending stairs;descending stairs;using handrail, left  -RF     Number of Stairs (Stairs Goal 1, PT-IRF)  5  -RF     Falls Of Rough Level (Stairs Goal 1, PT-IRF)  minimum assist (75% or more patient effort);moderate assist (50-74% patient effort)  -RF     Time Frame (Stairs Goal 1, PT-IRF)  long-term goal (LTG)  -RF     Progress/Outcomes (Stairs Goal 1, PT-IRF)  goal ongoing  -RF     Row Name 09/15/21 1557          Positioning and Restraints    Pre-Treatment Position  sitting in chair/recliner bed in PM  -RF     In Bed  supine;call light within reach;encouraged to call for assist PM  -RF     In Wheelchair  sitting;call light within reach;encouraged to call for assist AM  -RF     Row Name 09/15/21 5567          Therapy Assessment/Plan (PT)    Rehab Potential/Prognosis (PT)  adequate, monitor progress closely  -RF     Frequency of Treatment (PT)  5 times per week  -RF     Estimated Duration of Therapy (PT)  2 weeks  -RF     Problem List (PT)  problems related to;balance;hemiparesis/hemiplegia;mobility;strength  -RF     Row Name 09/15/21 1557          Daily Progress Summary (PT)    Functional Goal Overall Progress (PT)  progressing toward functional goals as expected  -RF     Daily Progress Summary (PT)  Improving  neuromuscular activation and strength noted this date. Continued skilled care required for further improvements to ensure maximum safe function upon D/C.   -RF     Impairments Still Limiting Function (PT)  sensation decreased;strength decreased;impaired balance;coordination impaired;impaired functional activity tolerance;motor control impaired;postural control impaired;pain  -RF     Recommendations (PT)  Continue per current POC.   -RF       User Key  (r) = Recorded By, (t) = Taken By, (c) = Cosigned By    Initials Name Provider Type    RF Malinda Alicea PTA Physical Therapy Assistant           Physical Therapy Education                 Title: PT OT SLP Therapies (Done)     Topic: Physical Therapy (Done)     Point: Mobility training (Done)     Learning Progress Summary           Patient Acceptance, E,TB, VU by RF at 9/15/2021 1603    Acceptance, E,TB, VU by RF at 9/14/2021 1614    Acceptance, E, VU,NR by LB at 9/13/2021 1121    Acceptance, E,TB, VU by RF at 9/10/2021 1556    Acceptance, E,TB, VU by RF at 9/9/2021 1551    Acceptance, E,TB, VU by RF at 9/8/2021 1602    Acceptance, E,TB, VU by RF at 9/7/2021 1605    Acceptance, E,TB, VU by RF at 9/6/2021 1557    Acceptance, E,TB, VU by RF at 9/3/2021 1532    Acceptance, E,TB, VU by DG at 9/2/2021 2003    Acceptance, E,TB, VU by RF at 9/2/2021 1610    Acceptance, E,TB, VU by DG at 9/1/2021 2256    Acceptance, E,TB, VU by RF at 9/1/2021 1548    Acceptance, E,TB, VU by RF at 8/31/2021 1423    Acceptance, E,TB, VU by RF at 8/30/2021 1548                   Point: Home exercise program (Done)     Learning Progress Summary           Patient Acceptance, E,TB, VU by RF at 9/15/2021 1603    Acceptance, E,TB, VU by RF at 9/14/2021 1614    Acceptance, E, VU,NR by LB at 9/13/2021 1121    Acceptance, E,TB, VU by RF at 9/10/2021 1556    Acceptance, E,TB, VU by RF at 9/9/2021 1551    Acceptance, E,TB, VU by RF at 9/8/2021 1602    Acceptance, E,TB, VU by RF at 9/7/2021 1605     Acceptance, E,TB, VU by RF at 9/6/2021 1557    Acceptance, E,TB, VU by RF at 9/3/2021 1532    Acceptance, E,TB, VU by DG at 9/2/2021 2003    Acceptance, E,TB, VU by RF at 9/2/2021 1610    Acceptance, E,TB, VU by DG at 9/1/2021 2256    Acceptance, E,TB, VU by RF at 9/1/2021 1548    Acceptance, E,TB, VU by RF at 8/31/2021 1423    Acceptance, E,TB, VU by RF at 8/30/2021 1548                   Point: Body mechanics (Done)     Learning Progress Summary           Patient Acceptance, E,TB, VU by RF at 9/15/2021 1603    Acceptance, E,TB, VU by RF at 9/14/2021 1614    Acceptance, E, VU,NR by LB at 9/13/2021 1121    Acceptance, E,TB, VU by RF at 9/10/2021 1556    Acceptance, E,TB, VU by RF at 9/9/2021 1551    Acceptance, E,TB, VU by RF at 9/8/2021 1602    Acceptance, E,TB, VU by RF at 9/7/2021 1605    Acceptance, E,TB, VU by RF at 9/6/2021 1557    Acceptance, E,TB, VU by RF at 9/3/2021 1532    Acceptance, E,TB, VU by DG at 9/2/2021 2003    Acceptance, E,TB, VU by RF at 9/2/2021 1610    Acceptance, E,TB, VU by DG at 9/1/2021 2256    Acceptance, E,TB, VU by RF at 9/1/2021 1548    Acceptance, E,TB, VU by RF at 8/31/2021 1423    Acceptance, E,TB, VU by RF at 8/30/2021 1548                   Point: Precautions (Done)     Learning Progress Summary           Patient Acceptance, E,TB, VU by RF at 9/15/2021 1603    Acceptance, E,TB, VU by RF at 9/14/2021 1614    Acceptance, E, VU,NR by LB at 9/13/2021 1121    Acceptance, E,TB, VU by RF at 9/10/2021 1556    Acceptance, E,TB, VU by RF at 9/9/2021 1551    Acceptance, E,TB, VU by RF at 9/8/2021 1602    Acceptance, E,TB, VU by RF at 9/7/2021 1605    Acceptance, E,TB, VU by RF at 9/6/2021 1557    Acceptance, E,TB, VU by RF at 9/3/2021 1532    Acceptance, E,TB, VU by DG at 9/2/2021 2003    Acceptance, E,TB, VU by RF at 9/2/2021 1610    Acceptance, E,TB, VU by DG at 9/1/2021 2256    Acceptance, E,TB, VU by RF at 9/1/2021 1548    Acceptance, E,TB, VU by RF at 8/31/2021 1423    Acceptance,  E,TB, VU by RF at 8/30/2021 1548                               User Key     Initials Effective Dates Name Provider Type Discipline    DG 06/16/21 -  Melissa Swift, RN Registered Nurse Nurse    LB 06/16/21 -  Anju Reis, PT Physical Therapist PT    RF 06/16/21 -  Malinda Alicea PTA Physical Therapy Assistant PT                PT Recommendation and Plan    Frequency of Treatment (PT): 5 times per week     Daily Progress Summary (PT)  Functional Goal Overall Progress (PT): progressing toward functional goals as expected  Daily Progress Summary (PT): Improving neuromuscular activation and strength noted this date. Continued skilled care required for further improvements to ensure maximum safe function upon D/C.   Impairments Still Limiting Function (PT): sensation decreased, strength decreased, impaired balance, coordination impaired, impaired functional activity tolerance, motor control impaired, postural control impaired, pain  Recommendations (PT): Continue per current POC.                Time Calculation:     PT Charges     Row Name 09/15/21 1604 09/15/21 1603          Time Calculation    Start Time  1330  -RF  0915  -RF     Stop Time  1415  -RF  1000  -RF     Time Calculation (min)  45 min  -RF  45 min  -RF     PT Received On  09/15/21  -RF  09/15/21  -RF     PT - Next Appointment  09/16/21  -RF  09/15/21  -RF     PT Goal Re-Cert Due Date  09/17/21  -RF  09/17/21  -RF        Time Calculation- PT    Total Timed Code Minutes- PT  45 minute(s)  -RF  45 minute(s)  -RF       User Key  (r) = Recorded By, (t) = Taken By, (c) = Cosigned By    Initials Name Provider Type    RF Malinda Alicea PTA Physical Therapy Assistant          Therapy Charges for Today     Code Description Service Date Service Provider Modifiers Qty    51765638606 HC GAIT TRAINING EA 15 MIN 9/14/2021 Malinda Alicea, PTA GP, KX 2    25546851395 HC PT THERAPEUTIC ACT EA 15 MIN 9/14/2021 Malinda Alicea PTA GP, KX 2    [id number]  HC PT NEUROMUSC RE EDUCATION EA 15 MIN 9/14/2021 Malinda Alicea, PTA GP, KX 2    57032223775 HC PT THER PROC EA 15 MIN 9/15/2021 Malinda Alicea, PTA GP, KX 4    [id number]  PT THERAPEUTIC ACT EA 15 MIN 9/15/2021 Malinda Alicea, PTA GP, KX 1    [id number]  PT NEUROMUSC RE EDUCATION EA 15 MIN 9/15/2021 Malinda Alicea, PTA GP, KX 1                   Malinda Alicea, PTA  9/15/2021

## 2021-09-15 NOTE — PROGRESS NOTES
[facility]  PROGRESS NOTE     Patient Identification:  Name:  Joycelyn Brown  Age:  81 y.o.  Sex:  female  :  1940  MRN:  [id number]  Visit Number:  [id number]  ROOM: 110/1S     Primary Care Provider:  Donya Looney APRN    Length of stay in inpatient status:  19    Subjective     Chief Compliant:  No chief complaint on file.      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     Patient is sitting up in bed eating breakfast seems to be very comfortable with no acute distress and no complaints.  She participated with physical therapy occupational therapy and speech therapy on 2021 and was at maximum assist with bed to chair and chair to bed transfers and moderate to maximum assist with sit to stand and stand to sit transfers and maximum assist with stand pivot and stand step transfers with the use of wheelchair and was at 2 person assist to moderate assist with locomotion and was able to ambulate 6 feet 3 times with the use of parallel bars.        Objective     Current Hospital Meds:amLODIPine, 10 mg, Oral, BID  apixaban, 5 mg, Oral, Q12H  ascorbic acid, 500 mg, Oral, Daily  atorvastatin, 80 mg, Oral, Nightly  carvedilol, 12.5 mg, Oral, BID With Meals  chlorthalidone, 25 mg, Oral, Daily  insulin aspart, 0-7 Units, Subcutaneous, TID AC  insulin detemir, 20 Units, Subcutaneous, Q12H  ketotifen, 1 drop, Both Eyes, TID  levothyroxine, 50 mcg, Oral, Q AM  linagliptin, 5 mg, Oral, Daily  melatonin, 10 mg, Oral, Nightly  multivitamin with minerals, 1 tablet, Oral, Daily  pantoprazole, 40 mg, Oral, Q AM  senna-docusate sodium, 1 tablet, Oral, BID  sertraline, 50 mg, Oral, Daily  vitamin B-12, 1,000 mcg, Oral, Daily       ----------------------------------------------------------------------------------------------------------------------  Vital Signs:  Temp:  [97.5 °F (36.4 °C)-98.2 °F (36.8 °C)] 97.5  °F (36.4 °C)  Heart Rate:  [63-71] 63  Resp:  [18-20] 20  BP: (135-157)/(68-74) 138/68  SpO2:  [93 %-98 %] 96 %  on  Flow (L/min):  [2] 2;   Device (Oxygen Therapy): nasal cannula  Body mass index is 28.19 kg/m².    Wt Readings from Last 3 Encounters:   08/27/21 81.6 kg (180 lb)   02/10/15 81.7 kg (180 lb 0.1 oz)     Intake & Output (last 3 days)       09/12 0701 - 09/13 0700 09/13 0701 - 09/14 0700 09/14 0701 - 09/15 0700 09/15 0701 - 09/16 0700    P.O. 840 960 990     Total Intake(mL/kg) 840 (10.3) 960 (11.8) 990 (12.1)     Net +840 +960 +990             Urine Unmeasured Occurrence 4 x 6 x 6 x     Stool Unmeasured Occurrence   2 x         Diet Regular; Thin  ----------------------------------------------------------------------------------------------------------------------  Physical exam:  Constitutional:   81-year-old female in no distress, very stable with no complaints.  Very comfortable with no issues reported overnight.  She is very happy with her progress overall.  HEENT: Normocephalic atraumatic  Neck: Supple   Cardiovascular: Regular rate and rhythm  Pulmonary/Chest: Clear to auscultation bilaterally with no rhonchi wheezing or crackles.  Abdominal: Positive bowel sounds soft.   Musculoskeletal: No arthropathy  Neurological: Right-sided hemiparesis  Skin: No rash    ----------------------------------------------------------------------------------------------------------------------    Last echocardiogram:    ----------------------------------------------------------------------------------------------------------------------  Results from last 7 days   Lab Units 09/11/21  0315   WBC 10*3/mm3 10.34   HEMOGLOBIN g/dL 13.1   HEMATOCRIT % 39.2   MCV fL 92.2   MCHC g/dL 33.4   PLATELETS 10*3/mm3 311         Results from last 7 days   Lab Units 09/11/21  0315   SODIUM mmol/L 139   POTASSIUM mmol/L 3.7   CHLORIDE mmol/L 98   CO2 mmol/L 30.8*   BUN mg/dL 30*   CREATININE mg/dL 1.18*   EGFR IF NONAFRICN AM  mL/min/1.73 44*   CALCIUM mg/dL 9.4   GLUCOSE mg/dL 190*   Estimated Creatinine Clearance: 41.8 mL/min (A) (by C-G formula based on SCr of 1.18 mg/dL (H)).  No results found for: AMMONIA              Glucose   Date/Time Value Ref Range Status   09/15/2021 0614 189 (H) 70 - 130 mg/dL Final     Comment:     Meter: [id number] : [id number] Enrique Crystal   09/14/2021 2002 258 (H) 70 - 130 mg/dL Final     Comment:     Meter: [id number] : [id number] Sebastian Virginia C   09/14/2021 1612 261 (H) 70 - 130 mg/dL Final     Comment:     Meter: [id number] : [id number] Yanira Missy   09/14/2021 1131 434 (C) 70 - 130 mg/dL Final     Comment:     RN Notified Meter: [id number] : [id number] Zamora Najma   09/14/2021 0645 170 (H) 70 - 130 mg/dL Final     Comment:     Meter: [id number] : [id number] Miko Crystal   09/13/2021 2028 287 (H) 70 - 130 mg/dL Final     Comment:     Meter: [id number] : [id number] Miko Crystal   09/13/2021 1621 200 (H) 70 - 130 mg/dL Final     Comment:     Meter: [id number] : [id number] Noah Najma   09/13/2021 1117 392 (H) 70 - 130 mg/dL Final     Comment:     Meter: [id number] : [id number] preet bocanegra     No results found for: TSH, FREET4  No results found for: PREGTESTUR, PREGSERUM, HCG, HCGQUANT  Pain Management Panel    There is no flowsheet data to display.       Brief Urine Lab Results     None        No results found for: BLOODCX      No results found for: URINECX  No results found for: WOUNDCX  No results found for: STOOLCX        I have personally looked at the labs and they are summarized above.  ----------------------------------------------------------------------------------------------------------------------  Detailed radiology reports for the last 24 hours:    Imaging Results (Last 24 Hours)     ** No results found for the last 24 hours. **        Final impressions for the last 30 days of radiology reports:    FL Video Swallow Single Contrast    Result Date:  9/10/2021  No evidence of aspiration.  For additional information please see the report provided by the speech therapy service  This report was finalized on 9/10/2021 10:27 AM by Dr. Reg Morales MD.      FL Video Swallow Single Contrast    Result Date: 9/3/2021      Please see the speech pathologist's report for additional information.  This report was finalized on 9/3/2021 12:31 PM by Dr. Anthony Gresham MD.      FL Video Swallow Single Contrast    Result Date: 8/28/2021  1.  Penetration with aspiration of thin liquids. 2. Please see dysphasia notes for further details.  This report was finalized on 8/28/2021 11:49 AM by Dr. Obie Fleming MD.      I have personally looked at the radiology images and read the final radiology report.    Assessment & Plan      Status post left MCA pontine CVA--patient currently on thin liquids and a soft diet.  We will continue Eliquis, statin therapy. Patient is sitting up in bed eating breakfast seems to be very comfortable with no acute distress and no complaints.  She participated with physical therapy occupational therapy and speech therapy on 9/14/2021 and was at maximum assist with bed to chair and chair to bed transfers and moderate to maximum assist with sit to stand and stand to sit transfers and maximum assist with stand pivot and stand step transfers with the use of wheelchair and was at 2 person assist to moderate assist with locomotion and was able to ambulate 6 feet 3 times with the use of parallel bars.    Constipation--continue MiraLAX and Senokot    Hypertension--well controlled.  Continue Norvasc, Coreg, chlorthalidone    Depression had increased Zoloft to 50 mg a day this week    Hypothyroidism continue Synthroid        VTE Prophylaxis:   Mechanical Order History:      Ordered        08/27/21 1125  Maintain Sequential Compression Device  Continuous         08/27/21 1125  Place Sequential Compression Device  Once                 Pharmalogical Order History:       Ordered     Dose Route Frequency Stop    08/27/21 1125  apixaban (ELIQUIS) tablet 5 mg      5 mg PO Every 12 Hours Scheduled --                    Joselito Hernandez MD  09/15/21  07:40 EDT

## 2021-09-15 NOTE — THERAPY TREATMENT NOTE
Inpatient Rehabilitation - Occupational Therapy Treatment Note     Tre     Patient Name: Joycelyn Brown  : 1940  MRN: [id number]    Today's Date: 9/15/2021                 Admit Date: 2021         ICD-10-CM ICD-9-CM   1. CVA (cerebral vascular accident) (CMS/[facility])  I63.9 434.91       Patient Active Problem List   Diagnosis   • CVA (cerebral vascular accident) (CMS/[facility])       History reviewed. No pertinent past medical history.    No past surgical history on file.             IRF OT ASSESSMENT FLOWSHEET (last 12 hours)      IRF OT Evaluation and Treatment     Row Name 09/15/21 1400          OT Time and Intention    Document Type  daily treatment  -     Mode of Treatment  individual therapy;occupational therapy  -     Patient Effort  good  -     Row Name 09/15/21 1400          General Information    Patient/Family/Caregiver Comments/Observations  patient agreeable to therapy. patient tolerated therapy well with no complaints today  -     Existing Precautions/Restrictions  fall;oxygen therapy device and L/min  -     Row Name 09/15/21 1400          Transfers    Bed-Chair [place] (Transfers)  maximum assist (25% patient effort);2 person assist  -     Row Name 09/15/21 1400          Motor Skills    Motor Skills  coordination;functional endurance;therapeutic exercise;neuro-muscular function  -     Therapeutic Exercise  -- RUE PROM, AROM AAROM, vibration, wt bearing  -     Row Name 09/15/21 1400          Modality Intervention (Comprehensive)    Modality Treatment  NMES/FES/Bioness right wrist and hand extension  -       User Key  (r) = Recorded By, (t) = Taken By, (c) = Cosigned By    Initials Name Effective Dates     Maria Guadalupe Monge, OT 21 -            Occupational Therapy Education                 Title: PT OT SLP Therapies (Done)     Topic: Occupational Therapy (Done)     Point: ADL training (Done)     Description:   Instruct learner(s) on proper safety adaptation and  remediation techniques during self care or transfers.   Instruct in proper use of assistive devices.              Learning Progress Summary           Patient Acceptance, E,TB, VU by DG at 9/2/2021 2003    Acceptance, E,D, VU,NR by  at 9/2/2021 1458    Acceptance, E,TB, VU by DG at 9/1/2021 2256    Acceptance, E,D, VU,NR by  at 9/1/2021 1520    Acceptance, E,D, VU,NR by  at 8/31/2021 1541    Acceptance, E, VU,NR by  at 8/30/2021 1507    Acceptance, E,TB, VU by DG at 8/28/2021 2040    Acceptance, E,D, VU,NR by AB at 8/28/2021 1410                   Point: Home exercise program (Done)     Description:   Instruct learner(s) on appropriate technique for monitoring, assisting and/or progressing therapeutic exercises/activities.              Learning Progress Summary           Patient Acceptance, E,TB, VU by DG at 9/2/2021 2003    Acceptance, E,TB, VU by DG at 9/1/2021 2256    Acceptance, E,TB, VU by DG at 8/28/2021 2040                   Point: Precautions (Done)     Description:   Instruct learner(s) on prescribed precautions during self-care and functional transfers.              Learning Progress Summary           Patient Acceptance, E,TB, VU by  at 9/2/2021 2003    Acceptance, E,D, VU,NR by  at 9/2/2021 1458    Acceptance, E,TB, VU by DG at 9/1/2021 2256    Acceptance, E,D, VU,NR by  at 9/1/2021 1520    Acceptance, E,D, VU,NR by  at 8/31/2021 1541    Acceptance, E, VU,NR by  at 8/30/2021 1507    Acceptance, E,TB, VU by  at 8/28/2021 2040    Acceptance, E,D, VU,NR by AB at 8/28/2021 1410                               User Key     Initials Effective Dates Name Provider Type Discipline    AB 06/16/21 -  Yazmin Chaves, OT Occupational Therapist OT     06/16/21 -  Melissa Swift RN Registered Nurse Nurse     06/16/21 -  Debbie Mehta, OT Occupational Therapist OT     06/16/21 -  Lucrecia Andrews, OT Occupational Therapist OT                    OT Recommendation and Plan                          Time Calculation:     Time Calculation- OT     Row Name 09/15/21 1433             Time Calculation- OT    OT Start Time  0745  -      OT Stop Time  0915  -      OT Time Calculation (min)  90 min  -        User Key  (r) = Recorded By, (t) = Taken By, (c) = Cosigned By    Initials Name Provider Type     Maria Guadalupe Monge, OT Occupational Therapist        Therapy Charges for Today     Code Description Service Date Service Provider Modifiers Qty    25330105525 HC OT SELF CARE/MGMT/TRAIN EA 15 MIN 9/14/2021 Maria Guadalupe Monge, OT GO, KX 2    41857344183 HC OT NEUROMUSC RE EDUCATION EA 15 MIN 9/14/2021 Maria Guadalupe Monge, OT GO, KX 3    90013690325 HC OT ELEC STIM EA-PER 15 MIN 9/14/2021 Maria Guadalupe Monge, OT GO, KX 1    10765530894 HC OT SELF CARE/MGMT/TRAIN EA 15 MIN 9/15/2021 Maria Guadalupe Monge, OT  2    72516546221 HC OT NEUROMUSC RE EDUCATION EA 15 MIN 9/15/2021 Maria Guadalupe Monge, OT  3    92510603553 HC OT ELEC STIM EA-PER 15 MIN 9/15/2021 Maria Guadalupe Monge, OT  1                   Maria Guadalupe Monge OT  9/15/2021

## 2021-09-15 NOTE — PROGRESS NOTES
Occupational Therapy:    Physical Therapy: Individual: 90 minutes.    Speech Language Pathology:    Signed by: Malinda Alicea PTA     Subjective     Quan Murhpy is a 69 year old male who presents to clinic today for the following health issues:    HPI   Patient is in clinic to touch base with his primary care provider. He has a hernia in his esophagus and will need to be set up for surgery.      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  Over the last month he has had increased discomfort in his upper abdomen and with eating.  Developed of sore throat.  Was seen in the emergency room.  Sent for EGD where it was discovered to have a paraesophageal hernia.  Also was felt to have thrush.  Esophageal biopsies showed inflammation.  He has been on Prilosec 40 mg a day but was issued nystatin and Carafate.  1 or both of those is causing him to have horrible stools.  He wants to quit them both.  Has a history of coronary artery disease and had an LAD stent in 2016.  On chronic opioids.  Has chronic sinus issues and frontal headaches.  He has been taking more lately.  He does take nonsteroidals.    Past Medical History:   Diagnosis Date     Allergy, unspecified not elsewhere classified     Seasonal allergies, pollen, dust, smoke and animals     Antiplatelet or antithrombotic long-term use      Anxiety      Arthritis      Chest pain      Chronic sinusitis      Coronary atherosclerosis of unspecified type of vessel, native or graft     Coronary artery disease     Hyperlipidemia      Hypertension      Inguinal hernia      Kidney stones      Malignant neoplasm of prostate (H)     Prostate cancer     Prostate cancer (H)      Past Surgical History:   Procedure Laterality Date     ARTHRODESIS FOOT  7/23/2013    Procedure: ARTHRODESIS FOOT;  Great Toe Arthrodesis Left Foot;  Surgeon: Ash Gonzalez DPM;  Location: [facility] OR     ARTHRODESIS FOOT  6/10/2014    Procedure: ARTHRODESIS FOOT;  Surgeon: Ash Gonzalez DPM;  Location: [facility] OR     C LAPAROSCOPY, SURGICAL PROSTATECTOMY,  RETROPUBIC RADICAL, W/NERVE SPARING  11/30/2004    With full bilateral pelvic lymphadenectomy.  F-[facility].     C TOTAL KNEE ARTHROPLASTY  05/01/08    Left knee     COLONOSCOPY  10/7/2013    Procedure: COLONOSCOPY;  Colonoscopy;  Surgeon: Mike Fallon MD;  Location: [facility]     ESOPHAGOSCOPY, GASTROSCOPY, DUODENOSCOPY (EGD), COMBINED N/A 2/12/2020    Procedure: ESOPHAGOGASTRODUODENOSCOPY (EGD);  Surgeon: Sam Escobar MD;  Location: [facility]     EXTRACORPOREAL SHOCK WAVE LITHOTRIPSY (ESWL) Bilateral 10/18/2017    Procedure: EXTRACORPOREAL SHOCK WAVE LITHOTRIPSY (ESWL);  BILATERAL EXTRACORPOREAL SHOCKWAVE LITHOTRIPSY ;  Surgeon: Meir Torres MD;  Location: [facility] OR     HC CORRECT BUNION,SIMPLE  08/11/2005    x3     HC REMV TOE BENIGN BONE LESN  08/11/2005     HERNIORRHAPHY INGUINAL  7/3/2013    Procedure: HERNIORRHAPHY INGUINAL;  Open Repair Inguinal hernia Right with mesh ;  Surgeon: Sam Escobar MD;  Location: [facility]     MOHS MICROGRAPHIC PROCEDURE  08/23/11    ear and chin-[facility]     OPEN REDUCTION INTERNAL FIXATION WRIST Right 7/18/2017    Procedure: OPEN REDUCTION INTERNAL FIXATION WRIST;  Right distal radius open reduction and internal fixation;  Surgeon: Pedro Blanca DO;  Location: [facility]     RECONSTRUCT FOREFOOT WITH METATARSOPHALANGEAL (MTP) FUSION  6/10/2014    Procedure: RECONSTRUCT FOREFOOT WITH METATARSOPHALANGEAL (MTP) FUSION;  Surgeon: Ash Gonzalez DPM;  Location: [facility]     STENT, CORONARY, DEMI       SURGICAL HISTORY OF -   1999/1974    lt knee     SURGICAL HISTORY OF -   10/2004    lithotripsy     SURGICAL HISTORY OF - 11/05    angiogram with stent     Social History     Tobacco Use     Smoking status: Never Smoker     Smokeless tobacco: Never Used   Substance Use Topics     Alcohol use: Yes     Alcohol/week: 8.3 standard drinks     Types: 10 Cans of beer per week     Comment: 2 beers a day      Current Outpatient Medications   Medication Sig Dispense  Refill     ALPRAZolam (XANAX) 0.5 MG tablet TAKE ONE TABLET BY MOUTH THREE TIMES A DAY AS NEEDED FOR ANXIETY 90 tablet 1     ASPIRIN ADULT LOW STRENGTH 81 MG EC tablet TAKE ONE TABLET BY MOUTH ONCE DAILY 90 tablet 1     cetirizine (ZYRTEC) 10 MG tablet Take 10 mg by mouth daily       CIALIS 20 MG tablet Take 20 mg by mouth daily as needed        citalopram (CELEXA) 40 MG tablet Take 1 tablet (40 mg) by mouth daily 30 tablet 10     fluconazole (DIFLUCAN) 100 MG tablet Take 1 tablet (100 mg) by mouth daily 10 tablet 0     HYDROcodone-acetaminophen (NORCO) 7.5-325 MG per tablet Take 1 tablet by mouth every 6 hours as needed for moderate to severe pain maximum 4 tablet(s) per day 120 tablet 0     hydrOXYzine (VISTARIL) 25 MG capsule Take 1 capsule (25 mg) by mouth 3 times daily as needed for anxiety 60 capsule 3     ketoconazole (NIZORAL) 2 % external cream Apply topically 2 times daily 60 g 1     lisinopril (PRINIVIL/ZESTRIL) 10 MG tablet Take 1 tablet (10 mg) by mouth daily 90 tablet 3     nystatin (MYCOSTATIN) 005182 UNIT/ML suspension Take 5 mLs (500,000 Units) by mouth 4 times daily 60 mL 3     omeprazole (PRILOSEC) 40 MG DR capsule Take 1 capsule (40 mg) by mouth daily 90 capsule 2     oxyCODONE (ROXICODONE) 5 MG tablet Take 1 tablet (5 mg) by mouth every 6 hours as needed for pain 12 tablet 0     rosuvastatin (CRESTOR) 40 MG tablet Take 1 tablet (40 mg) by mouth daily 90 tablet 3     sucralfate (CARAFATE) 1 GM/10ML suspension Take 10 mLs (1 g) by mouth 4 times daily 420 mL 0     zolpidem (AMBIEN) 10 MG tablet Take 1 tablet (10 mg) by mouth nightly as needed for sleep 30 tablet 5     azithromycin (ZITHROMAX) 250 MG tablet Two tablets first day, then one tablet daily for four days. (Patient not taking: Reported on 2/17/2020) 6 tablet 0     neomycin-polymyxin-hydrocortisone (CORTISPORIN) 3.5-53426-6 otic suspension Place 4 drops Into the left ear 4 times daily (Patient not taking: Reported on 11/6/2018) 10 mL 0      "nitroglycerin (NITROSTAT) 0.4 MG sublingual tablet For chest pain place 1 tablet under the tongue every 5 minutes for 3 doses. If symptoms persist 5 minutes after 1st dose call 911. (Patient not taking: Reported on 11/12/2019) 25 tablet 0     olopatadine (PATANOL) 0.1 % ophthalmic solution Place 1 drop into both eyes 2 times daily (Patient not taking: Reported on 3/15/2019) 5 mL 3       REVIEW OF SYSTEMS:   5 point ROS negative except as noted above in HPI, including Gen., Resp, CV, GI &  system review.     OBJECTIVE:  Vitals: /84 (BP Location: Right arm, Patient Position: Sitting, Cuff Size: Adult Large)   Pulse 103   Temp 98.1  F (36.7  C) (Temporal)   Resp 16   Ht 1.745 m (5' 8.7\")   Wt 98.2 kg (216 lb 9.6 oz)   SpO2 97%   BMI 32.27 kg/m    BMI= Body mass index is 32.27 kg/m .  He is alert appears pale and in moderate distress.  Eyes PERRLA.  Mucous membranes are moist.  Throat has a little drainage.  Ears are clear.  Neck supple no adenopathy.  Lungs are clear to auscultation.  Heart regular rhythm no murmur.  Abdomen bowel sounds present tender to palpation in the epigastric area.  Extremities with no edema.  Skin clear.    ASSESSMENT:  #1 paraesophageal hernia #2 esophagitis #3 Chronic pain    PLAN:  We will set him up for thoracic surgery consult.  He should have the pain medication that he needs at this time.  There was some confusion last Friday but I believe he has what he needs at this time is been taking more because of this.  This has upset him.  We will have him stop the nystatin and the Carafate as one or the other is causing him problems.  Get a put him on Diflucan 100 mg a day for 10 days.    This visit and exam would suffice for preoperative exam and clear him for anesthesia if something was going to be done soon .  Just had lab work done which was acceptable in the emergency room and an electrocardiogram which was fine.    Jose Hernandez MD  [facility] " [place]

## 2021-09-16 LAB
ALBUMIN SERPL-MCNC: 3.29 G/DL (ref 3.5–5.2)
ALBUMIN/GLOB SERPL: 1.2 G/DL
ALP SERPL-CCNC: 97 U/L (ref 39–117)
ALT SERPL W P-5'-P-CCNC: 22 U/L (ref 1–33)
ANION GAP SERPL CALCULATED.3IONS-SCNC: 7.5 MMOL/L (ref 5–15)
AST SERPL-CCNC: 21 U/L (ref 1–32)
BILIRUB SERPL-MCNC: 0.3 MG/DL (ref 0–1.2)
BUN SERPL-MCNC: 31 MG/DL (ref 8–23)
BUN/CREAT SERPL: 26.7 (ref 7–25)
CALCIUM SPEC-SCNC: 9.6 MG/DL (ref 8.6–10.5)
CHLORIDE SERPL-SCNC: 98 MMOL/L (ref 98–107)
CO2 SERPL-SCNC: 31.5 MMOL/L (ref 22–29)
CREAT SERPL-MCNC: 1.16 MG/DL (ref 0.57–1)
GFR SERPL CREATININE-BSD FRML MDRD: 45 ML/MIN/1.73
GLOBULIN UR ELPH-MCNC: 2.8 GM/DL
GLUCOSE BLDC GLUCOMTR-MCNC: 147 MG/DL (ref 70–130)
GLUCOSE BLDC GLUCOMTR-MCNC: 154 MG/DL (ref 70–130)
GLUCOSE BLDC GLUCOMTR-MCNC: 198 MG/DL (ref 70–130)
GLUCOSE BLDC GLUCOMTR-MCNC: 228 MG/DL (ref 70–130)
GLUCOSE SERPL-MCNC: 192 MG/DL (ref 65–99)
POTASSIUM SERPL-SCNC: 3.6 MMOL/L (ref 3.5–5.2)
PROT SERPL-MCNC: 6.1 G/DL (ref 6–8.5)
SODIUM SERPL-SCNC: 137 MMOL/L (ref 136–145)

## 2021-09-16 PROCEDURE — 63710000001 INSULIN ASPART PER 5 UNITS: Performed by: INTERNAL MEDICINE

## 2021-09-16 PROCEDURE — 80053 COMPREHEN METABOLIC PANEL: CPT | Performed by: INTERNAL MEDICINE

## 2021-09-16 PROCEDURE — 82962 GLUCOSE BLOOD TEST: CPT

## 2021-09-16 PROCEDURE — 97112 NEUROMUSCULAR REEDUCATION: CPT | Performed by: OCCUPATIONAL THERAPIST

## 2021-09-16 PROCEDURE — 97535 SELF CARE MNGMENT TRAINING: CPT | Performed by: OCCUPATIONAL THERAPIST

## 2021-09-16 PROCEDURE — 63710000001 INSULIN DETEMIR PER 5 UNITS: Performed by: INTERNAL MEDICINE

## 2021-09-16 PROCEDURE — 97530 THERAPEUTIC ACTIVITIES: CPT

## 2021-09-16 PROCEDURE — 97110 THERAPEUTIC EXERCISES: CPT

## 2021-09-16 PROCEDURE — 97116 GAIT TRAINING THERAPY: CPT

## 2021-09-16 PROCEDURE — 97032 APPL MODALITY 1+ESTIM EA 15: CPT | Performed by: OCCUPATIONAL THERAPIST

## 2021-09-16 PROCEDURE — 94799 UNLISTED PULMONARY SVC/PX: CPT

## 2021-09-16 RX ADMIN — Medication 10 MG: at 20:28

## 2021-09-16 RX ADMIN — PANTOPRAZOLE SODIUM 40 MG: 40 TABLET, DELAYED RELEASE ORAL at 05:56

## 2021-09-16 RX ADMIN — APIXABAN 5 MG: 5 TABLET, FILM COATED ORAL at 08:27

## 2021-09-16 RX ADMIN — AMLODIPINE BESYLATE 10 MG: 10 TABLET ORAL at 20:28

## 2021-09-16 RX ADMIN — KETOTIFEN FUMARATE 1 DROP: 0.35 SOLUTION/ DROPS OPHTHALMIC at 17:00

## 2021-09-16 RX ADMIN — Medication 1 TABLET: at 08:26

## 2021-09-16 RX ADMIN — ATORVASTATIN CALCIUM 80 MG: 40 TABLET, FILM COATED ORAL at 20:28

## 2021-09-16 RX ADMIN — CHLORTHALIDONE 25 MG: 50 TABLET ORAL at 08:26

## 2021-09-16 RX ADMIN — INSULIN DETEMIR 20 UNITS: 100 INJECTION, SOLUTION SUBCUTANEOUS at 20:33

## 2021-09-16 RX ADMIN — INSULIN DETEMIR 20 UNITS: 100 INJECTION, SOLUTION SUBCUTANEOUS at 08:33

## 2021-09-16 RX ADMIN — KETOTIFEN FUMARATE 1 DROP: 0.35 SOLUTION/ DROPS OPHTHALMIC at 20:29

## 2021-09-16 RX ADMIN — OXYCODONE HYDROCHLORIDE AND ACETAMINOPHEN 500 MG: 500 TABLET ORAL at 08:26

## 2021-09-16 RX ADMIN — LEVOTHYROXINE SODIUM 50 MCG: 50 TABLET ORAL at 05:56

## 2021-09-16 RX ADMIN — Medication 1000 MCG: at 08:26

## 2021-09-16 RX ADMIN — APIXABAN 5 MG: 5 TABLET, FILM COATED ORAL at 20:28

## 2021-09-16 RX ADMIN — INSULIN ASPART 2 UNITS: 100 INJECTION, SOLUTION INTRAVENOUS; SUBCUTANEOUS at 08:27

## 2021-09-16 RX ADMIN — SERTRALINE 50 MG: 50 TABLET, FILM COATED ORAL at 08:26

## 2021-09-16 RX ADMIN — KETOTIFEN FUMARATE 1 DROP: 0.35 SOLUTION/ DROPS OPHTHALMIC at 08:27

## 2021-09-16 RX ADMIN — INSULIN ASPART 3 UNITS: 100 INJECTION, SOLUTION INTRAVENOUS; SUBCUTANEOUS at 12:34

## 2021-09-16 RX ADMIN — LINAGLIPTIN 5 MG: 5 TABLET, FILM COATED ORAL at 08:26

## 2021-09-16 NOTE — PROGRESS NOTES
Occupational Therapy:    Physical Therapy: Individual: 90 minutes.    Speech Language Pathology:    Signed by: Lani Austin PT

## 2021-09-16 NOTE — PROGRESS NOTES
Rehabilitation Nursing  Inpatient Rehabilitation Plan of Care Note    Plan of Care  Pain    Pain Management (Active)  Current Status (8/27/2021 10:38:00 AM): potential for pain  Weekly Goal: No pain this week  Discharge Goal: No pain    Safety    Potential for Injury (Active)  Current Status (8/27/2021 10:38:00 AM): At risk for injury  Weekly Goal: No injury this week  Discharge Goal: No injuries    Signed by: Debbie Rahman RN

## 2021-09-16 NOTE — THERAPY TREATMENT NOTE
Inpatient Rehabilitation - Occupational Therapy Treatment Note     Tre     Patient Name: Joycelyn Brown  : 1940  MRN: [id number]    Today's Date: 2021                 Admit Date: 2021         ICD-10-CM ICD-9-CM   1. CVA (cerebral vascular accident) (CMS/[facility])  I63.9 434.91       Patient Active Problem List   Diagnosis   • CVA (cerebral vascular accident) (CMS/[facility])       History reviewed. No pertinent past medical history.    No past surgical history on file.             IRF OT ASSESSMENT FLOWSHEET (last 12 hours)      IRF OT Evaluation and Treatment     [facility] Name 21 1500          OT Time and Intention    Document Type  daily treatment  -     Mode of Treatment  individual therapy;occupational therapy  -     Patient Effort  good  -[facility] Name 21 1500          General Information    Patient/Family/Caregiver Comments/Observations  patient agreeable to therapy. patient states she is tired today  -     Existing Precautions/Restrictions  fall;oxygen therapy device and L/min  -[facility] Name 21 1500          Transfers    Bed-Chair [place] (Transfers)  maximum assist (25% patient effort);2 person assist  -     Assistive Device (Bed-Chair Transfers)  wheelchair  -[facility] Name 21 1500          Motor Skills    Motor Skills  coordination;functional endurance;therapeutic exercise  -     Therapeutic Exercise  -- RUE ROM, arm skate, wt bearing,   -[facility] Name 21 1500          Lower Body Dressing    [place] Level (Lower Body Dressing)  maximum assist (25% patient effort)  -AH     Row Name 21 1500          Grooming    [place] Level (Grooming)  minimum assist (75% patient effort);moderate assist (50% patient effort)  -AH     Row Name 21 1500          Toileting    [place] Level (Toileting)  maximum assist (25% patient effort);dependent (less than 25% patient effort)  -AH     Row Name 21 1500          Modality Intervention  (Comprehensive)    Modality Treatment  NMES/FES/Bioness  -     Additional Documentation  -- Right wrist and hand extension  -       User Key  (r) = Recorded By, (t) = Taken By, (c) = Cosigned By    Initials Name Effective Dates    JESUS Maria Guadalupe Monge, OT 06/16/21 -            Occupational Therapy Education                 Title: PT OT SLP Therapies (Done)     Topic: Occupational Therapy (Done)     Point: ADL training (Done)     Description:   Instruct learner(s) on proper safety adaptation and remediation techniques during self care or transfers.   Instruct in proper use of assistive devices.              Learning Progress Summary           Patient Acceptance, E,TB, VU by  at 9/15/2021 2303    Acceptance, E,TB, VU by  at 9/2/2021 2003    Acceptance, E,D, VU,NR by  at 9/2/2021 1458    Acceptance, E,TB, VU by  at 9/1/2021 2256    Acceptance, E,D, VU,NR by  at 9/1/2021 1520    Acceptance, E,D, VU,NR by  at 8/31/2021 1541    Acceptance, E, VU,NR by  at 8/30/2021 1507    Acceptance, E,TB, VU by DG at 8/28/2021 2040    Acceptance, E,D, VU,NR by AB at 8/28/2021 1410                   Point: Home exercise program (Done)     Description:   Instruct learner(s) on appropriate technique for monitoring, assisting and/or progressing therapeutic exercises/activities.              Learning Progress Summary           Patient Acceptance, E,TB, VU by DG at 9/15/2021 2303    Acceptance, E,TB, VU by DG at 9/2/2021 2003    Acceptance, E,TB, VU by DG at 9/1/2021 2256    Acceptance, E,TB, VU by DG at 8/28/2021 2040                   Point: Precautions (Done)     Description:   Instruct learner(s) on prescribed precautions during self-care and functional transfers.              Learning Progress Summary           Patient Acceptance, E,TB, VU by  at 9/15/2021 2303    Acceptance, E,TB, VU by DG at 9/2/2021 2003    Acceptance, E,D, VU,NR by  at 9/2/2021 1458    Acceptance, E,TB, VU by  at 9/1/2021 2256     Acceptance, E,D, VU,NR by  at 9/1/2021 1520    Acceptance, E,D, VU,NR by CJ at 8/31/2021 1541    Acceptance, E, VU,NR by  at 8/30/2021 1507    Acceptance, E,TB, VU by  at 8/28/2021 2040    Acceptance, E,D, VU,NR by AB at 8/28/2021 1410                               User Key     Initials Effective Dates Name Provider Type Discipline    AB 06/16/21 -  Yazmin Chaves, OT Occupational Therapist OT    DG 06/16/21 -  Melissa Swift, RN Registered Nurse Nurse     06/16/21 -  Debbie Mehta, OT Occupational Therapist OT     06/16/21 -  Lucrecia Andrews, OT Occupational Therapist OT                    OT Recommendation and Plan                         Time Calculation:     Time Calculation- OT     Row Name 09/16/21 1513             Time Calculation- OT    OT Start Time  0745  -      OT Stop Time  0915  -      OT Time Calculation (min)  90 min  -        User Key  (r) = Recorded By, (t) = Taken By, (c) = Cosigned By    Initials Name Provider Type     Maria Guadalupe Monge, OT Occupational Therapist        Therapy Charges for Today     Code Description Service Date Service Provider Modifiers Qty    39573932246 HC OT SELF CARE/MGMT/TRAIN EA 15 MIN 9/15/2021 Maria Guadalupe Monge, OT GO, KX 2    98070317992 HC OT NEUROMUSC RE EDUCATION EA 15 MIN 9/15/2021 Maria Guadalupe Monge, OT GO, KX 3    43026409111 HC OT ELEC STIM EA-PER 15 MIN 9/15/2021 Maria Guadalupe Monge OT GO, KX 1    71088104245 HC OT SELF CARE/MGMT/TRAIN EA 15 MIN 9/16/2021 Maria Guadalupe Monge, OT GO, KX 1    95644327902 HC OT NEUROMUSC RE EDUCATION EA 15 MIN 9/16/2021 Maria Guadalupe Monge, OT GO, KX 3    11328824449 HC OT ELEC STIM EA-PER 15 MIN 9/16/2021 Maria Guadalupe Monge OT GO, KX 1                   Maria Guadalupe Monge OT  9/16/2021

## 2021-09-16 NOTE — PLAN OF CARE
Goal Outcome Evaluation:         Progressing medically and physically with therapies. Continue current plan of care.

## 2021-09-16 NOTE — THERAPY TREATMENT NOTE
Inpatient Rehabilitation - Physical Therapy Treatment Note        Tre     Patient Name: Joycelyn Brown  : 1940  MRN: [id number]    Today's Date: 2021                    Admit Date: 2021      Visit Dx:     ICD-10-CM ICD-9-CM   1. CVA (cerebral vascular accident) (CMS/HCC)  I63.9 434.91       Patient Active Problem List   Diagnosis   • CVA (cerebral vascular accident) (CMS/[facility])       History reviewed. No pertinent past medical history.    No past surgical history on file.       PT ASSESSMENT (last 12 hours)      IRF PT Evaluation and Treatment     [facility] Name 21 110          PT Time and Intention    Document Type  daily treatment  -     Mode of Treatment  physical therapy  -     Patient/Family/Caregiver Comments/Observations  Pt c/o of fatigue  -     Total Minutes, Physical Therapy  90  -     Row Name 21 110          Coping Strategies    Trust Relationship/Rapport  care explained  -[facility] Name 21 110          Transfer Assessment/Treatment    Transfers  sit-stand transfer;stand-sit transfer;bed-chair transfer;chair-bed transfer  -     Row Name 21 1104          Transfers    Bed-Chair [place] (Transfers)  maximum assist (25% patient effort);2 person assist  -     Chair-Bed [place] (Transfers)  maximum assist (25% patient effort);verbal cues;nonverbal cues (demo/gesture)  -     Assistive Device (Bed-Chair Transfers)  wheelchair  -     Sit-Stand [place] (Transfers)  moderate assist (50% patient effort);maximum assist (25% patient effort);2 person assist  -     Stand-Sit [place] (Transfers)  contact guard;moderate assist (50% patient effort)  -     Row Name 21 110          Chair-Bed Transfer    Assistive Device (Chair-Bed Transfers)  wheelchair  -[facility] Name 21 110          Sit-Stand Transfer    Assistive Device (Sit-Stand Transfers)  wheelchair;other (see comments) PB  -[facility] Name 21 110           Stand-Sit Transfer    Assistive Device (Stand-Sit Transfers)  wheelchair;other (see comments) PB  -     Row Name 09/16/21 1104          Gait/Stairs (Locomotion)    Gait/Stairs Locomotion  gait/ambulation assistive device  -     [place] Level (Gait)  2 person assist;moderate assist (50% patient effort)  -     Assistive Device (Gait)  parallel bars  -     Distance in Feet (Gait)  4' X 4   -     Pattern (Gait)  step-to;step-through  -KH     Bilateral Gait Deviations  knee buckling, right side;foot drop/toe drag  -     Right Sided Gait Deviations  knee buckling, right side;weight shift ability decreased  -     Comment (Gait/Stairs)  Pt VC'd with R LE weight shifting and knee extension to adress buckling of knee.   -     Row Name 09/16/21 1104          Balance    Balance Assessment  sitting static balance;sitting dynamic balance  -     Static Sitting Balance  mild impairment  -     Dynamic Sitting Balance  mild impairment  -     Comment, Balance  Pt performed chapin bag toss with emphasis on upright posture to stimulate postural muscles. Pt encouraged to lean away from R side and maintain upright posturing.   -     Row Name 09/16/21 1104          Motor Skills    Therapeutic Exercise  hip;knee Please see flow sheet for detailed TE  -     Row Name 09/16/21 1104          [place] Electrical Stimulation Treatment    Location 1 (Russian E-Stim Treatment)  lower extremity treatment area R hip flexors  -     Indications (Russian E-Stim Treatment)  enhance muscle contraction  -     Treatment Details (Russian Electrical Stimulation Treatment)  10 mins x 40 mA  -     Response to Treatment (Russian E-Stim Treatment)  muscle contraction enhanced of quadriceps; no adverse reactions noted  -     Row Name 09/16/21 1104          IRF PT Goals    Bed Mobility Goal Selection (PT-IRF)  bed mobility, PT goal 1  -     Transfer Goal Selection (PT-IRF)  transfers, PT goal 1  -     Gait (Walking  Locomotion) Goal Selection (PT-IRF)  gait, PT goal 1  -     Stairs Goal Selection (PT-IRF)  stairs, PT goal 1  -     Row Name 09/16/21 1104          Bed Mobility Goal 1 (PT-IRF)    Activity/Assistive Device (Bed Mobility Goal 1, PT-IRF)  bed mobility activities, all  -KH     [place] Level (Bed Mobility Goal 1, PT-IRF)  standby assist  -     Time Frame (Bed Mobility Goal 1, PT-IRF)  long-term goal (LTG)  -     Row Name 09/16/21 1104          Transfer Goal 1 (PT-IRF)    Activity/Assistive Device (Transfer Goal 1, PT-IRF)  all transfers  -     [place] Level (Transfer Goal 1, PT-IRF)  minimum assist (75% or more patient effort)  -     Time Frame (Transfer Goal 1, PT-IRF)  by discharge  -     Row Name 09/16/21 1104          Gait/Walking Locomotion Goal 1 (PT-IRF)    Activity/Assistive Device (Gait/Walking Locomotion Goal 1, PT-IRF)  gait (walking locomotion);assistive device use  -     Gait/Walking Locomotion Distance Goal 1 (PT-IRF)  5  -KH     [place] Level (Gait/Walking Locomotion Goal 1, PT-IRF)  minimum assist (75% or more patient effort)  -     Time Frame (Gait/Walking Locomotion Goal 1, PT-IRF)  long-term goal (LTG)  -     Row Name 09/16/21 1104          Stairs Goal 1 (PT-IRF)    Activity/Assistive Device (Stairs Goal 1, PT-IRF)  stairs, all skills;ascending stairs;descending stairs;using handrail, left  -     Number of Stairs (Stairs Goal 1, PT-IRF)  5  -KH     [place] Level (Stairs Goal 1, PT-IRF)  minimum assist (75% or more patient effort);moderate assist (50-74% patient effort)  -     Time Frame (Stairs Goal 1, PT-IRF)  long-term goal (LTG)  -     Row Name 09/16/21 1104          Positioning and Restraints    Pre-Treatment Position  sitting in chair/recliner  -     Post Treatment Position  wheelchair  -     In Wheelchair  sitting;call light within reach  -     Row Name 09/16/21 1104          Daily Progress Summary (PT)    Daily Progress Summary (PT)  Pt had  noticable improvement with ambulation this date, with decreased knee buckling. Pt demonstrates increased muscle contraction compared to evaluation. Continue with therapy.   -MARIVEL       User Key  (r) = Recorded By, (t) = Taken By, (c) = Cosigned By    Initials Name Provider Type    Lani Reyes, PT Physical Therapist           Physical Therapy Education                 Title: PT OT SLP Therapies (Done)     Topic: Physical Therapy (Done)     Point: Mobility training (Done)     Learning Progress Summary           Patient Acceptance, E,TB, VU by DG at 9/15/2021 2303    Acceptance, E,TB, VU by RF at 9/15/2021 1603    Acceptance, E,TB, VU by RF at 9/14/2021 1614    Acceptance, E, VU,NR by LB at 9/13/2021 1121    Acceptance, E,TB, VU by RF at 9/10/2021 1556    Acceptance, E,TB, VU by RF at 9/9/2021 1551    Acceptance, E,TB, VU by RF at 9/8/2021 1602    Acceptance, E,TB, VU by RF at 9/7/2021 1605    Acceptance, E,TB, VU by RF at 9/6/2021 1557    Acceptance, E,TB, VU by RF at 9/3/2021 1532    Acceptance, E,TB, VU by DG at 9/2/2021 2003    Acceptance, E,TB, VU by RF at 9/2/2021 1610    Acceptance, E,TB, VU by DG at 9/1/2021 2256    Acceptance, E,TB, VU by RF at 9/1/2021 1548    Acceptance, E,TB, VU by RF at 8/31/2021 1423    Acceptance, E,TB, VU by RF at 8/30/2021 1548                   Point: Home exercise program (Done)     Learning Progress Summary           Patient Acceptance, E,TB, VU by DG at 9/15/2021 2303    Acceptance, E,TB, VU by RF at 9/15/2021 1603    Acceptance, E,TB, VU by RF at 9/14/2021 1614    Acceptance, E, VU,NR by LB at 9/13/2021 1121    Acceptance, E,TB, VU by RF at 9/10/2021 1556    Acceptance, E,TB, VU by RF at 9/9/2021 1551    Acceptance, E,TB, VU by RF at 9/8/2021 1602    Acceptance, E,TB, VU by RF at 9/7/2021 1605    Acceptance, E,TB, VU by RF at 9/6/2021 1557    Acceptance, E,TB, VU by RF at 9/3/2021 1532    Acceptance, E,TB, VU by DG at 9/2/2021 2003    Acceptance, E,TB, VU by RF at 9/2/2021  1610    Acceptance, E,TB, VU by DG at 9/1/2021 2256    Acceptance, E,TB, VU by RF at 9/1/2021 1548    Acceptance, E,TB, VU by RF at 8/31/2021 1423    Acceptance, E,TB, VU by RF at 8/30/2021 1548                   Point: Body mechanics (Done)     Learning Progress Summary           Patient Acceptance, E,TB, VU by DG at 9/15/2021 2303    Acceptance, E,TB, VU by RF at 9/15/2021 1603    Acceptance, E,TB, VU by RF at 9/14/2021 1614    Acceptance, E, VU,NR by LB at 9/13/2021 1121    Acceptance, E,TB, VU by RF at 9/10/2021 1556    Acceptance, E,TB, VU by RF at 9/9/2021 1551    Acceptance, E,TB, VU by RF at 9/8/2021 1602    Acceptance, E,TB, VU by RF at 9/7/2021 1605    Acceptance, E,TB, VU by RF at 9/6/2021 1557    Acceptance, E,TB, VU by RF at 9/3/2021 1532    Acceptance, E,TB, VU by DG at 9/2/2021 2003    Acceptance, E,TB, VU by RF at 9/2/2021 1610    Acceptance, E,TB, VU by DG at 9/1/2021 2256    Acceptance, E,TB, VU by RF at 9/1/2021 1548    Acceptance, E,TB, VU by RF at 8/31/2021 1423    Acceptance, E,TB, VU by RF at 8/30/2021 1548                   Point: Precautions (Done)     Learning Progress Summary           Patient Acceptance, E,TB, VU by DG at 9/15/2021 2303    Acceptance, E,TB, VU by RF at 9/15/2021 1603    Acceptance, E,TB, VU by RF at 9/14/2021 1614    Acceptance, E, VU,NR by LB at 9/13/2021 1121    Acceptance, E,TB, VU by RF at 9/10/2021 1556    Acceptance, E,TB, VU by RF at 9/9/2021 1551    Acceptance, E,TB, VU by RF at 9/8/2021 1602    Acceptance, E,TB, VU by RF at 9/7/2021 1605    Acceptance, E,TB, VU by RF at 9/6/2021 1557    Acceptance, E,TB, VU by RF at 9/3/2021 1532    Acceptance, E,TB, VU by DG at 9/2/2021 2003    Acceptance, E,TB, VU by RF at 9/2/2021 1610    Acceptance, E,TB, VU by DG at 9/1/2021 2256    Acceptance, E,TB, VU by RF at 9/1/2021 1548    Acceptance, E,TB, VU by RF at 8/31/2021 1423    Acceptance, E,TB, VU by RF at 8/30/2021 1548                               User Key     Initials  Effective Dates Name Provider Type Discipline    DG 06/16/21 -  Melissa Swift RN Registered Nurse Nurse    LB 06/16/21 -  Anju Reis, PT Physical Therapist PT    RF 06/16/21 -  Malinda Alicea PTA Physical Therapy Assistant PT                PT Recommendation and Plan    Planned Therapy Interventions (PT): balance training, bed mobility training, gait training, home exercise program, neuromuscular re-education, patient/family education, postural re-education, ROM (range of motion), strengthening, transfer training  Frequency of Treatment (PT): 5 times per week     Daily Progress Summary (PT)  Daily Progress Summary (PT): Pt had noticable improvement with ambulation this date, with decreased knee buckling. Pt demonstrates increased muscle contraction compared to evaluation. Continue with therapy.                Time Calculation:     PT Charges     Row Name 09/16/21 1116             Time Calculation    Start Time  0920  -      Stop Time  1050  -      Time Calculation (min)  90 min  -      PT Received On  09/16/21  -         Time Calculation- PT    Total Timed Code Minutes- PT  90 minute(s)  -        User Key  (r) = Recorded By, (t) = Taken By, (c) = Cosigned By    Initials Name Provider Type     Lani Austin, PT Physical Therapist          Therapy Charges for Today     Code Description Service Date Service Provider Modifiers Qty    06109928177 HC GAIT TRAINING EA 15 MIN 9/16/2021 Lani Austin, PT GP, KX 2    68216330385 HC PT THER PROC EA 15 MIN 9/16/2021 Lani Austin, PT GP, KX 2    34301675708 HC PT THERAPEUTIC ACT EA 15 MIN 9/16/2021 Lani Austin PT GP, KX 2                   Lani Austin PT  9/16/2021

## 2021-09-16 NOTE — PROGRESS NOTES
[facility]  PROGRESS NOTE     Patient Identification:  Name:  Joycelyn Brown  Age:  81 y.o.  Sex:  female  :  1940  MRN:  [id number]  Visit Number:  [id number]  ROOM: 110/1S     Primary Care Provider:  Donya Looney APRN    Length of stay in inpatient status:  20    Subjective     Chief Compliant:  No chief complaint on file.      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     Patient is sitting up in bed eating breakfast seems to be very comfortable with no acute distress and no complaints.  She participated with physical therapy occupational therapy and speech therapy and was at maximum assist with bed to chair and chair to bed transfers and moderate to maximum assist with sit to stand and stand to sit transfers and maximum assist with stand pivot and stand step transfers with the use of wheelchair and was at 2 person assist to moderate assist with locomotion and was able to ambulate with the use of parallel bars.        Objective     Current Hospital Meds:amLODIPine, 10 mg, Oral, BID  apixaban, 5 mg, Oral, Q12H  ascorbic acid, 500 mg, Oral, Daily  atorvastatin, 80 mg, Oral, Nightly  carvedilol, 12.5 mg, Oral, BID With Meals  chlorthalidone, 25 mg, Oral, Daily  insulin aspart, 0-7 Units, Subcutaneous, TID AC  insulin detemir, 20 Units, Subcutaneous, Q12H  ketotifen, 1 drop, Both Eyes, TID  levothyroxine, 50 mcg, Oral, Q AM  linagliptin, 5 mg, Oral, Daily  melatonin, 10 mg, Oral, Nightly  multivitamin with minerals, 1 tablet, Oral, Daily  pantoprazole, 40 mg, Oral, Q AM  senna-docusate sodium, 1 tablet, Oral, BID  sertraline, 50 mg, Oral, Daily  vitamin B-12, 1,000 mcg, Oral, Daily       ----------------------------------------------------------------------------------------------------------------------  Vital Signs:  Temp:  [98.2 °F (36.8 °C)-98.5 °F (36.9 °C)] 98.2 °F (36.8 °C)  Heart Rate:   [61-66] 64  Resp:  [18-20] 18  BP: (145-155)/(56-70) 145/70  SpO2:  [95 %-98 %] 95 %  on  Flow (L/min):  [2-3] 3;   Device (Oxygen Therapy): nasal cannula  Body mass index is 28.19 kg/m².    Wt Readings from Last 3 Encounters:   08/27/21 81.6 kg (180 lb)   02/10/15 81.7 kg (180 lb 0.1 oz)     Intake & Output (last 3 days)       09/13 0701 - 09/14 0700 09/14 0701 - 09/15 0700 09/15 0701 - 09/16 0700 09/16 0701 - 09/17 0700    P.O.      Total Intake(mL/kg) 960 (11.8) 990 (12.1) 1040 (12.7)     Net +960 +990 +1040             Urine Unmeasured Occurrence 6 x 6 x 9 x     Stool Unmeasured Occurrence  2 x 5 x         Diet Regular; Thin  ----------------------------------------------------------------------------------------------------------------------  Physical exam:  Constitutional:   81-year-old female in no distress, very stable with no complaints.  Very comfortable with no complaints this morning. Feels better. Did not sleep too well.  HEENT: Normocephalic atraumatic  Neck: Supple   Cardiovascular: Regular rate and rhythm  Pulmonary/Chest: Clear to auscultation bilaterally with no rhonchi wheezing or crackles.  Abdominal: Positive bowel sounds soft.   Musculoskeletal: No arthropathy  Neurological: Right-sided hemiparesis  Skin: No rash    ----------------------------------------------------------------------------------------------------------------------    Last echocardiogram:    ----------------------------------------------------------------------------------------------------------------------  Results from last 7 days   Lab Units 09/11/21  0315   WBC 10*3/mm3 10.34   HEMOGLOBIN g/dL 13.1   HEMATOCRIT % 39.2   MCV fL 92.2   MCHC g/dL 33.4   PLATELETS 10*3/mm3 311         Results from last 7 days   Lab Units 09/16/21  0121 09/11/21  0315   SODIUM mmol/L 137 139   POTASSIUM mmol/L 3.6 3.7   CHLORIDE mmol/L 98 98   CO2 mmol/L 31.5* 30.8*   BUN mg/dL 31* 30*   CREATININE mg/dL 1.16* 1.18*   EGFR IF  NONAFRICN AM mL/min/1.73 45* 44*   CALCIUM mg/dL 9.6 9.4   GLUCOSE mg/dL 192* 190*   ALBUMIN g/dL 3.29*  --    BILIRUBIN mg/dL 0.3  --    ALK PHOS U/L 97  --    AST (SGOT) U/L 21  --    ALT (SGPT) U/L 22  --    Estimated Creatinine Clearance: 42.5 mL/min (A) (by C-G formula based on SCr of 1.16 mg/dL (H)).  No results found for: AMMONIA              Glucose   Date/Time Value Ref Range Status   09/16/2021 0602 154 (H) 70 - 130 mg/dL Final     Comment:     Meter: [id number] : [id number] Sebastianrosario Cervantes C   09/15/2021 2023 247 (H) 70 - 130 mg/dL Final     Comment:     Meter: [id number] : [id number] Enrique Crystal   09/15/2021 1619 248 (H) 70 - 130 mg/dL Final     Comment:     Meter: [id number] : [id number] Noah Najma   09/15/2021 1138 246 (H) 70 - 130 mg/dL Final     Comment:     Meter: [id number] : [id number] Zamora Najma   09/15/2021 0614 189 (H) 70 - 130 mg/dL Final     Comment:     Meter: [id number] : [id number] Enrique Crystal   09/14/2021 2002 258 (H) 70 - 130 mg/dL Final     Comment:     Meter: [id number] : [id number] Sebastian Cervantes C   09/14/2021 1612 261 (H) 70 - 130 mg/dL Final     Comment:     Meter: [id number] : [id number] Yanira Irbyy   09/14/2021 1131 434 (C) 70 - 130 mg/dL Final     Comment:     RN Notified Meter: [id number] : [id number] Noah Mangy     No results found for: TSH, FREET4  No results found for: PREGTESTUR, PREGSERUM, HCG, HCGQUANT  Pain Management Panel    There is no flowsheet data to display.       Brief Urine Lab Results     None        No results found for: BLOODCX      No results found for: URINECX  No results found for: WOUNDCX  No results found for: STOOLCX        I have personally looked at the labs and they are summarized above.  ----------------------------------------------------------------------------------------------------------------------  Detailed radiology reports for the last 24 hours:    Imaging Results (Last 24 Hours)     ** No results  found for the last 24 hours. **        Final impressions for the last 30 days of radiology reports:    FL Video Swallow Single Contrast    Result Date: 9/10/2021  No evidence of aspiration.  For additional information please see the report provided by the speech therapy service  This report was finalized on 9/10/2021 10:27 AM by Dr. Reg Morales MD.      FL Video Swallow Single Contrast    Result Date: 9/3/2021      Please see the speech pathologist's report for additional information.  This report was finalized on 9/3/2021 12:31 PM by Dr. Anthony Gresham MD.      FL Video Swallow Single Contrast    Result Date: 8/28/2021  1.  Penetration with aspiration of thin liquids. 2. Please see dysphasia notes for further details.  This report was finalized on 8/28/2021 11:49 AM by Dr. Obie Fleming MD.      I have personally looked at the radiology images and read the final radiology report.    Assessment & Plan      Feels great with no complaints. Status post left MCA pontine CVA--patient currently on thin liquids and a soft diet.  We will continue Eliquis, statin therapy. Patient is sitting up in bed eating breakfast seems to be very comfortable with no acute distress and no complaints.  She participated with physical therapy occupational therapy and speech therapy and was at maximum assist with bed to chair and chair to bed transfers and moderate to maximum assist with sit to stand and stand to sit transfers and maximum assist with stand pivot and stand step transfers with the use of wheelchair and was at 2 person assist to moderate assist with locomotion and was able to ambulate with the use of parallel bars.    Constipation--continue MiraLAX and Senokot    Hypertension--well controlled.  Continue Norvasc, Coreg, chlorthalidone    Depression had increased Zoloft to 50 mg a day this week    Hypothyroidism continue Synthroid        VTE Prophylaxis:   Mechanical Order History:      Ordered        08/27/21 1125  Maintain  Sequential Compression Device  Continuous         08/27/21 1125  Place Sequential Compression Device  Once                 Pharmalogical Order History:      Ordered     Dose Route Frequency Stop    08/27/21 1125  apixaban (ELIQUIS) tablet 5 mg      5 mg PO Every 12 Hours Scheduled --                    Joselito Hernandez MD  09/16/21  07:50 EDT

## 2021-09-17 LAB
GLUCOSE BLDC GLUCOMTR-MCNC: 162 MG/DL (ref 70–130)
GLUCOSE BLDC GLUCOMTR-MCNC: 164 MG/DL (ref 70–130)
GLUCOSE BLDC GLUCOMTR-MCNC: 214 MG/DL (ref 70–130)
GLUCOSE BLDC GLUCOMTR-MCNC: 235 MG/DL (ref 70–130)

## 2021-09-17 PROCEDURE — 97112 NEUROMUSCULAR REEDUCATION: CPT

## 2021-09-17 PROCEDURE — 97116 GAIT TRAINING THERAPY: CPT

## 2021-09-17 PROCEDURE — 82962 GLUCOSE BLOOD TEST: CPT

## 2021-09-17 PROCEDURE — 97535 SELF CARE MNGMENT TRAINING: CPT | Performed by: OCCUPATIONAL THERAPIST

## 2021-09-17 PROCEDURE — 97112 NEUROMUSCULAR REEDUCATION: CPT | Performed by: OCCUPATIONAL THERAPIST

## 2021-09-17 PROCEDURE — 97110 THERAPEUTIC EXERCISES: CPT | Performed by: OCCUPATIONAL THERAPIST

## 2021-09-17 PROCEDURE — 63710000001 INSULIN ASPART PER 5 UNITS: Performed by: INTERNAL MEDICINE

## 2021-09-17 PROCEDURE — 97530 THERAPEUTIC ACTIVITIES: CPT

## 2021-09-17 PROCEDURE — 63710000001 INSULIN DETEMIR PER 5 UNITS: Performed by: INTERNAL MEDICINE

## 2021-09-17 PROCEDURE — 97110 THERAPEUTIC EXERCISES: CPT

## 2021-09-17 RX ADMIN — DOCUSATE SODIUM 50 MG AND SENNOSIDES 8.6 MG 1 TABLET: 8.6; 5 TABLET, FILM COATED ORAL at 09:00

## 2021-09-17 RX ADMIN — INSULIN DETEMIR 20 UNITS: 100 INJECTION, SOLUTION SUBCUTANEOUS at 09:08

## 2021-09-17 RX ADMIN — PANTOPRAZOLE SODIUM 40 MG: 40 TABLET, DELAYED RELEASE ORAL at 05:07

## 2021-09-17 RX ADMIN — AMLODIPINE BESYLATE 10 MG: 10 TABLET ORAL at 09:03

## 2021-09-17 RX ADMIN — KETOTIFEN FUMARATE 1 DROP: 0.35 SOLUTION/ DROPS OPHTHALMIC at 17:30

## 2021-09-17 RX ADMIN — Medication 1 TABLET: at 09:00

## 2021-09-17 RX ADMIN — KETOTIFEN FUMARATE 1 DROP: 0.35 SOLUTION/ DROPS OPHTHALMIC at 20:41

## 2021-09-17 RX ADMIN — Medication 1000 MCG: at 09:03

## 2021-09-17 RX ADMIN — Medication 10 MG: at 20:39

## 2021-09-17 RX ADMIN — INSULIN ASPART 3 UNITS: 100 INJECTION, SOLUTION INTRAVENOUS; SUBCUTANEOUS at 12:51

## 2021-09-17 RX ADMIN — ACETAMINOPHEN 650 MG: 325 TABLET ORAL at 20:40

## 2021-09-17 RX ADMIN — DOCUSATE SODIUM 50 MG AND SENNOSIDES 8.6 MG 1 TABLET: 8.6; 5 TABLET, FILM COATED ORAL at 20:40

## 2021-09-17 RX ADMIN — SERTRALINE 50 MG: 50 TABLET, FILM COATED ORAL at 09:00

## 2021-09-17 RX ADMIN — INSULIN DETEMIR 20 UNITS: 100 INJECTION, SOLUTION SUBCUTANEOUS at 20:41

## 2021-09-17 RX ADMIN — CARVEDILOL 12.5 MG: 6.25 TABLET, FILM COATED ORAL at 09:00

## 2021-09-17 RX ADMIN — APIXABAN 5 MG: 5 TABLET, FILM COATED ORAL at 20:39

## 2021-09-17 RX ADMIN — LINAGLIPTIN 5 MG: 5 TABLET, FILM COATED ORAL at 09:00

## 2021-09-17 RX ADMIN — INSULIN ASPART 2 UNITS: 100 INJECTION, SOLUTION INTRAVENOUS; SUBCUTANEOUS at 09:03

## 2021-09-17 RX ADMIN — KETOTIFEN FUMARATE 1 DROP: 0.35 SOLUTION/ DROPS OPHTHALMIC at 09:03

## 2021-09-17 RX ADMIN — INSULIN ASPART 2 UNITS: 100 INJECTION, SOLUTION INTRAVENOUS; SUBCUTANEOUS at 17:32

## 2021-09-17 RX ADMIN — OXYCODONE HYDROCHLORIDE AND ACETAMINOPHEN 500 MG: 500 TABLET ORAL at 09:03

## 2021-09-17 RX ADMIN — AMLODIPINE BESYLATE 10 MG: 10 TABLET ORAL at 20:39

## 2021-09-17 RX ADMIN — CHLORTHALIDONE 25 MG: 50 TABLET ORAL at 09:00

## 2021-09-17 RX ADMIN — APIXABAN 5 MG: 5 TABLET, FILM COATED ORAL at 09:00

## 2021-09-17 RX ADMIN — LEVOTHYROXINE SODIUM 50 MCG: 50 TABLET ORAL at 05:07

## 2021-09-17 RX ADMIN — ATORVASTATIN CALCIUM 80 MG: 40 TABLET, FILM COATED ORAL at 20:39

## 2021-09-17 NOTE — PROGRESS NOTES
[facility]  PROGRESS NOTE     Patient Identification:  Name:  Joycelyn Brown  Age:  81 y.o.  Sex:  female  :  1940  MRN:  [id number]  Visit Number:  [id number]  ROOM: 110/1S     Primary Care Provider:  Donya Looney APRN    Length of stay in inpatient status:  21    Subjective     Chief Compliant:  No chief complaint on file.      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     Patient is sitting up in bed eating breakfast seems to be very comfortable with no acute distress and no complaints.  She participated with physical therapy occupational therapy and speech therapy and was at maximum assist with bed to chair and chair to bed transfers and moderate to maximum assist with sit to stand and stand to sit transfers and maximum assist with stand pivot and stand step transfers with the use of wheelchair and was at 2 person assist to moderate assist with locomotion and was able to ambulate with the use of parallel bars.        Objective     Current Hospital Meds:amLODIPine, 10 mg, Oral, BID  apixaban, 5 mg, Oral, Q12H  ascorbic acid, 500 mg, Oral, Daily  atorvastatin, 80 mg, Oral, Nightly  carvedilol, 12.5 mg, Oral, BID With Meals  chlorthalidone, 25 mg, Oral, Daily  insulin aspart, 0-7 Units, Subcutaneous, TID AC  insulin detemir, 20 Units, Subcutaneous, Q12H  ketotifen, 1 drop, Both Eyes, TID  levothyroxine, 50 mcg, Oral, Q AM  linagliptin, 5 mg, Oral, Daily  melatonin, 10 mg, Oral, Nightly  multivitamin with minerals, 1 tablet, Oral, Daily  pantoprazole, 40 mg, Oral, Q AM  senna-docusate sodium, 1 tablet, Oral, BID  sertraline, 50 mg, Oral, Daily  vitamin B-12, 1,000 mcg, Oral, Daily       ----------------------------------------------------------------------------------------------------------------------  Vital Signs:  Temp:  [97.7 °F (36.5 °C)-97.8 °F (36.6 °C)] 97.8 °F (36.6 °C)  Heart Rate:   [58-63] 60  Resp:  [16-18] 16  BP: (136-149)/(52-59) 143/59  SpO2:  [98 %-99 %] 98 %  on  Flow (L/min):  [2] 2;   Device (Oxygen Therapy): nasal cannula  Body mass index is 28.19 kg/m².    Wt Readings from Last 3 Encounters:   08/27/21 81.6 kg (180 lb)   02/10/15 81.7 kg (180 lb 0.1 oz)     Intake & Output (last 3 days)       09/14 0701 - 09/15 0700 09/15 0701 - 09/16 0700 09/16 0701 - 09/17 0700 09/17 0701 - 09/18 0700    P.O. 990 1040 240     Total Intake(mL/kg) 990 (12.1) 1040 (12.7) 240 (2.9)     Net +990 +1040 +240             Urine Unmeasured Occurrence 6 x 9 x 3 x     Stool Unmeasured Occurrence 2 x 5 x 1 x         Diet Regular; Thin  ----------------------------------------------------------------------------------------------------------------------  Physical exam:  Constitutional:   81-year-old female in no distress, no complaints, very stable.  HEENT: Normocephalic atraumatic  Neck: Supple   Cardiovascular: Regular rate and rhythm  Pulmonary/Chest: Clear to auscultation bilaterally with no rhonchi wheezing or crackles.  Abdominal: Positive bowel sounds soft.   Musculoskeletal: No arthropathy  Neurological: Right-sided hemiparesis  Skin: No rash    ----------------------------------------------------------------------------------------------------------------------    Last echocardiogram:    ----------------------------------------------------------------------------------------------------------------------  Results from last 7 days   Lab Units 09/11/21  0315   WBC 10*3/mm3 10.34   HEMOGLOBIN g/dL 13.1   HEMATOCRIT % 39.2   MCV fL 92.2   MCHC g/dL 33.4   PLATELETS 10*3/mm3 311         Results from last 7 days   Lab Units 09/16/21  0121 09/11/21  0315   SODIUM mmol/L 137 139   POTASSIUM mmol/L 3.6 3.7   CHLORIDE mmol/L 98 98   CO2 mmol/L 31.5* 30.8*   BUN mg/dL 31* 30*   CREATININE mg/dL 1.16* 1.18*   EGFR IF NONAFRICN AM mL/min/1.73 45* 44*   CALCIUM mg/dL 9.6 9.4   GLUCOSE mg/dL 192* 190*   ALBUMIN g/dL  3.29*  --    BILIRUBIN mg/dL 0.3  --    ALK PHOS U/L 97  --    AST (SGOT) U/L 21  --    ALT (SGPT) U/L 22  --    Estimated Creatinine Clearance: 42.5 mL/min (A) (by C-G formula based on SCr of 1.16 mg/dL (H)).  No results found for: AMMONIA              Glucose   Date/Time Value Ref Range Status   09/17/2021 0710 164 (H) 70 - 130 mg/dL Final     Comment:     Meter: [id number] : [id number] kris walker   09/16/2021 2030 198 (H) 70 - 130 mg/dL Final     Comment:     Meter: [id number] : [id number] Miko Crystal   09/16/2021 1643 147 (H) 70 - 130 mg/dL Final     Comment:     Meter: [id number] : [id number] preet sahra   09/16/2021 1122 228 (H) 70 - 130 mg/dL Final     Comment:     Meter: [id number] : [id number] preet sahra   09/16/2021 0602 154 (H) 70 - 130 mg/dL Final     Comment:     Meter: [id number] : [id number] Sebastian Virginia C   09/15/2021 2023 247 (H) 70 - 130 mg/dL Final     Comment:     Meter: [id number] : [id number] Enrique Crystal   09/15/2021 1619 248 (H) 70 - 130 mg/dL Final     Comment:     Meter: [id number] : [id number] Zamora Najma   09/15/2021 1138 246 (H) 70 - 130 mg/dL Final     Comment:     Meter: [id number] : [id number] Zamora Najma     No results found for: TSH, FREET4  No results found for: PREGTESTUR, PREGSERUM, HCG, HCGQUANT  Pain Management Panel    There is no flowsheet data to display.       Brief Urine Lab Results     None        No results found for: BLOODCX      No results found for: URINECX  No results found for: WOUNDCX  No results found for: STOOLCX        I have personally looked at the labs and they are summarized above.  ----------------------------------------------------------------------------------------------------------------------  Detailed radiology reports for the last 24 hours:    Imaging Results (Last 24 Hours)     ** No results found for the last 24 hours. **        Final impressions for the last 30 days of radiology reports:    FL  Video Swallow Single Contrast    Result Date: 9/10/2021  No evidence of aspiration.  For additional information please see the report provided by the speech therapy service  This report was finalized on 9/10/2021 10:27 AM by Dr. Reg Morales MD.      FL Video Swallow Single Contrast    Result Date: 9/3/2021      Please see the speech pathologist's report for additional information.  This report was finalized on 9/3/2021 12:31 PM by Dr. Anthony Gresham MD.      FL Video Swallow Single Contrast    Result Date: 8/28/2021  1.  Penetration with aspiration of thin liquids. 2. Please see dysphasia notes for further details.  This report was finalized on 8/28/2021 11:49 AM by Dr. Obie Fleming MD.      I have personally looked at the radiology images and read the final radiology report.    Assessment & Plan      Feels great with no complaints. Status post left MCA pontine CVA--patient currently on thin liquids and a soft diet.  We will continue Eliquis, statin therapy. Patient is sitting up in bed eating breakfast seems to be very comfortable with no acute distress and no complaints.  She participated with physical therapy occupational therapy and speech therapy and was at maximum assist with bed to chair and chair to bed transfers and moderate to maximum assist with sit to stand and stand to sit transfers and maximum assist with stand pivot and stand step transfers with the use of wheelchair and was at 2 person assist to moderate assist with locomotion and was able to ambulate with the use of parallel bars.    Constipation--continue MiraLAX and Senokot    Hypertension--well controlled.  Continue Norvasc, Coreg, chlorthalidone    Depression had increased Zoloft to 50 mg a day this week    Hypothyroidism continue Synthroid        VTE Prophylaxis:   Mechanical Order History:      Ordered        08/27/21 1125  Maintain Sequential Compression Device  Continuous         08/27/21 1125  Place Sequential Compression Device  Once                  Pharmalogical Order History:      Ordered     Dose Route Frequency Stop    08/27/21 3949  apixaban (ELIQUIS) tablet 5 mg      5 mg PO Every 12 Hours Scheduled --                    Joselito Hernandez MD  09/17/21  07:53 EDT

## 2021-09-17 NOTE — THERAPY TREATMENT NOTE
Inpatient Rehabilitation - Occupational Therapy Treatment Note     Tre     Patient Name: Joycelyn Brown  : 1940  MRN: [id number]    Today's Date: 2021                 Admit Date: 2021         ICD-10-CM ICD-9-CM   1. CVA (cerebral vascular accident) (CMS/[facility])  I63.9 434.91       Patient Active Problem List   Diagnosis   • CVA (cerebral vascular accident) (CMS/[facility])       History reviewed. No pertinent past medical history.    No past surgical history on file.             IRF OT ASSESSMENT FLOWSHEET (last 12 hours)      IRF OT Evaluation and Treatment     Row Name 21 1456          OT Time and Intention    Document Type  daily treatment  -BF     Mode of Treatment  occupational therapy  -BF     Patient Effort  good  -BF     Row Name 21 1456          General Information    Existing Precautions/Restrictions  fall;oxygen therapy device and L/min  -BF     Row Name 21 1456          Cognition/Psychosocial    Orientation Status (Cognition)  oriented to;person;place  -BF     Follows Commands (Cognition)  follows one-step commands;verbal cues/prompting required;physical/tactile prompts required;repetition of directions required  -BF     Row Name 21 1456          Transfers    Bed-Chair [place] (Transfers)  maximum assist (25% patient effort);2 person assist;verbal cues;nonverbal cues (demo/gesture)  -BF     Chair-Bed [place] (Transfers)  maximum assist (25% patient effort);2 person assist;verbal cues;nonverbal cues (demo/gesture)  -BF     Assistive Device (Bed-Chair Transfers)  wheelchair  -BF     Row Name 21 1456          Chair-Bed Transfer    Assistive Device (Chair-Bed Transfers)  wheelchair  -BF     Row Name 21 1456          Motor Skills    Neuromuscular Function  right;upper extremity;severe impairment  -BF     Motor Control/Coordination Interventions  neuro-muscular re-education;gross motor coordination activities;therapeutic exercise/ROM YEE BLAIR  -BF      Row Name 09/17/21 1456          Neuromuscular Re-education    Interventions (Neuromuscular Re-education)  facilitation/inhibition;massage;tapping RUE  -BF     Positioning (Neuromuscular Re-education)  sitting;supine  -BF     Row Name 09/17/21 1456          Grooming    [place] Level (Grooming)  minimum assist (75% patient effort);moderate assist (50% patient effort);verbal cues;nonverbal cues (demo/gesture)  -BF     Position (Grooming)  supported sitting  -BF     Row Name 09/17/21 1456          Toileting    [place] Level (Toileting)  maximum assist (25% patient effort);dependent (less than 25% patient effort);verbal cues;nonverbal cues (demo/gesture)  -BF     Row Name 09/17/21 1456          Positioning and Restraints    In Bed  supine;call light within reach;encouraged to call for assist In PM  -BF     In Wheelchair  sitting;call light within reach;encouraged to call for assist In AM  -BF       User Key  (r) = Recorded By, (t) = Taken By, (c) = Cosigned By    Initials Name Effective Dates     Debbie Mehta OT 06/16/21 -            Occupational Therapy Education                 Title: PT OT SLP Therapies (Done)     Topic: Occupational Therapy (Done)     Point: ADL training (Done)     Description:   Instruct learner(s) on proper safety adaptation and remediation techniques during self care or transfers.   Instruct in proper use of assistive devices.              Learning Progress Summary           Patient Acceptance, E, VU,NR by  at 9/17/2021 1456    Acceptance, E,TB, VU by  at 9/16/2021 2222    Acceptance, E,TB, VU by  at 9/15/2021 2303    Acceptance, E,TB, VU by  at 9/2/2021 2003    Acceptance, E,D, VU,NR by  at 9/2/2021 1458    Acceptance, E,TB, VU by DG at 9/1/2021 2256    Acceptance, E,D, VU,NR by  at 9/1/2021 1520    Acceptance, E,D, VU,NR by  at 8/31/2021 1541    Acceptance, E, VU,NR by  at 8/30/2021 1507    Acceptance, E,TB, VU by  at 8/28/2021 2040    Acceptance,  E,D, VU,NR by AB at 8/28/2021 1410                   Point: Home exercise program (Done)     Description:   Instruct learner(s) on appropriate technique for monitoring, assisting and/or progressing therapeutic exercises/activities.              Learning Progress Summary           Patient Acceptance, E,TB, VU by DG at 9/16/2021 2222    Acceptance, E,TB, VU by DG at 9/15/2021 2303    Acceptance, E,TB, VU by DG at 9/2/2021 2003    Acceptance, E,TB, VU by DG at 9/1/2021 2256    Acceptance, E,TB, VU by DG at 8/28/2021 2040                   Point: Precautions (Done)     Description:   Instruct learner(s) on prescribed precautions during self-care and functional transfers.              Learning Progress Summary           Patient Acceptance, E, VU,NR by  at 9/17/2021 1456    Acceptance, E,TB, VU by DG at 9/16/2021 2222    Acceptance, E,TB, VU by DG at 9/15/2021 2303    Acceptance, E,TB, VU by DG at 9/2/2021 2003    Acceptance, E,D, VU,NR by  at 9/2/2021 1458    Acceptance, E,TB, VU by  at 9/1/2021 2256    Acceptance, E,D, VU,NR by  at 9/1/2021 1520    Acceptance, E,D, VU,NR by  at 8/31/2021 1541    Acceptance, E, VU,NR by  at 8/30/2021 1507    Acceptance, E,TB, VU by  at 8/28/2021 2040    Acceptance, E,D, VU,NR by AB at 8/28/2021 1410                               User Key     Initials Effective Dates Name Provider Type Discipline    AB 06/16/21 -  Yazmin Chaves, OT Occupational Therapist OT     06/16/21 -  Melissa Swift, RN Registered Nurse Nurse     06/16/21 -  Debbie Mehta, OT Occupational Therapist OT     06/16/21 -  Lucrecia Andrews OT Occupational Therapist OT                    OT Recommendation and Plan                         Time Calculation:     Time Calculation- OT     Row Name 09/17/21 1500 09/17/21 0825          Time Calculation- OT    OT Start Time  1415  -BF  0825  -BF     OT Stop Time  1440  -BF  0930  -BF     OT Time Calculation (min)  25 min  -BF  65 min  -BF      Total Timed Code Minutes- OT  25 minute(s)  -BF  65 minute(s)  -BF     OT Non-Billable Time (min)  --  20 min  -BF       User Key  (r) = Recorded By, (t) = Taken By, (c) = Cosigned By    Initials Name Provider Type    BF Debbie Metha OT Occupational Therapist        Therapy Charges for Today     Code Description Service Date Service Provider Modifiers Qty    [id number]  OT NEUROMUSC RE EDUCATION EA 15 MIN 9/17/2021 Debbie Mehta OT GO, KX 3    [id number]  OT SELF CARE/MGMT/TRAIN EA 15 MIN 9/17/2021 Debbie Mehta OT GO, KX 2    [id number]  OT THER PROC EA 15 MIN 9/17/2021 Debbie Mehta OT GO, KX 1                   Debbie Mehta OT  9/17/2021

## 2021-09-17 NOTE — PROGRESS NOTES
Occupational Therapy: Individual: 90 minutes.    Physical Therapy:    Speech Language Pathology:    Signed by: Debbie Mehta OT

## 2021-09-17 NOTE — PROGRESS NOTES
Occupational Therapy:    Physical Therapy: Individual: 90 minutes.    Speech Language Pathology:    Signed by: Lani uAstin PT

## 2021-09-17 NOTE — THERAPY TREATMENT NOTE
Inpatient Rehabilitation - Physical Therapy Treatment Note        Tre     Patient Name: Joycelyn Brown  : 1940  MRN: [id number]    Today's Date: 2021                    Admit Date: 2021      Visit Dx:     ICD-10-CM ICD-9-CM   1. CVA (cerebral vascular accident) (CMS/[facility])  I63.9 434.91       Patient Active Problem List   Diagnosis   • CVA (cerebral vascular accident) (CMS/[facility])       History reviewed. No pertinent past medical history.    No past surgical history on file.       PT ASSESSMENT (last 12 hours)      IRF PT Evaluation and Treatment     Row Name 21 1501          PT Time and Intention    Document Type  daily treatment  -     Mode of Treatment  physical therapy  -     Patient/Family/Caregiver Comments/Observations  Pt cooperative with therapy and in a good mood  -     Total Minutes, Physical Therapy  90  -[facility] Name 21 1501          Bed Mobility    Sit-Supine [place] (Bed Mobility)  maximum assist (25% patient effort);2 person assist  -[facility] Name 21 1501          Transfer Assessment/Treatment    Transfers  sit-stand transfer;stand-sit transfer  -     Comment (Transfers)  Pt performed multiple STS for ambulation and transfers.   -[facility] Name 21 1501          Transfers    Chair-Bed [place] (Transfers)  maximum assist (25% patient effort);2 person assist  -     Sit-Stand [place] (Transfers)  moderate assist (50% patient effort);2 person assist  -     Stand-Sit [place] (Transfers)  moderate assist (50% patient effort);verbal cues  -[facility] Name 21 1501          Chair-Bed Transfer    Assistive Device (Chair-Bed Transfers)  wheelchair  -[facility] Name 21 1501          Sit-Stand Transfer    Assistive Device (Sit-Stand Transfers)  wheelchair;other (see comments) this PT, PB  -[facility] Name 21 1501          Stand-Sit Transfer    Assistive Device (Stand-Sit Transfers)  wheelchair;other (see comments) this  PT, PB  -     Row Name 09/17/21 1501          Gait/Stairs (Locomotion)    Gait/Stairs Locomotion  gait/ambulation assistive device  -     D Lo Level (Gait)  2 person assist;minimum assist (75% patient effort)  -     Assistive Device (Gait)  parallel bars  -     Distance in Feet (Gait)  12 total   -     Pattern (Gait)  step-to;step-through  -KH     Bilateral Gait Deviations  knee buckling, right side  -     Comment (Gait/Stairs)  Pt VC'd with emphasis on weight shifting to assist with R LE foot clearance during stepping. Pt VC'd to assist with knee extension during gait to address knee buckling.   -     Row Name 09/17/21 1501          Balance    Balance Assessment  sitting static balance  -     Static Sitting Balance  mild impairment  -     Comment, Balance  Pt performed chapin bag toss with emphasis on postural tone/endurance. Overall pt seems to have improved with ability to sit independently.   -     Row Name 09/17/21 1501          Motor Skills    Therapeutic Exercise  hip;knee;core strength Please see flow sheet for detailed TE   -     Row Name 09/17/21 1501          Positioning and Restraints    Pre-Treatment Position  sitting in chair/recliner  -     Post Treatment Position  wheelchair bed PM  -KH     In Bed  supine;call light within reach  -     In Wheelchair  sitting;call light within reach  -     Row Name 09/17/21 1501          Daily Progress Summary (PT)    Daily Progress Summary (PT)  Pt is progressing with ambulation and sitting balance independence. Continue with therapy.   -       User Key  (r) = Recorded By, (t) = Taken By, (c) = Cosigned By    Initials Name Provider Type    Lani Reyes, PT Physical Therapist           Physical Therapy Education                 Title: PT OT SLP Therapies (Done)     Topic: Physical Therapy (Done)     Point: Mobility training (Done)     Learning Progress Summary           Patient Acceptance, E,TB, VU by DG at 9/16/2021 0700     Acceptance, E,TB, VU by DG at 9/15/2021 2303    Acceptance, E,TB, VU by RF at 9/15/2021 1603    Acceptance, E,TB, VU by RF at 9/14/2021 1614    Acceptance, E, VU,NR by LB at 9/13/2021 1121    Acceptance, E,TB, VU by RF at 9/10/2021 1556    Acceptance, E,TB, VU by RF at 9/9/2021 1551    Acceptance, E,TB, VU by RF at 9/8/2021 1602    Acceptance, E,TB, VU by RF at 9/7/2021 1605    Acceptance, E,TB, VU by RF at 9/6/2021 1557    Acceptance, E,TB, VU by RF at 9/3/2021 1532    Acceptance, E,TB, VU by DG at 9/2/2021 2003    Acceptance, E,TB, VU by RF at 9/2/2021 1610    Acceptance, E,TB, VU by DG at 9/1/2021 2256    Acceptance, E,TB, VU by RF at 9/1/2021 1548    Acceptance, E,TB, VU by RF at 8/31/2021 1423    Acceptance, E,TB, VU by RF at 8/30/2021 1548                   Point: Home exercise program (Done)     Learning Progress Summary           Patient Acceptance, E,TB, VU by DG at 9/16/2021 2222    Acceptance, E,TB, VU by DG at 9/15/2021 2303    Acceptance, E,TB, VU by RF at 9/15/2021 1603    Acceptance, E,TB, VU by RF at 9/14/2021 1614    Acceptance, E, VU,NR by LB at 9/13/2021 1121    Acceptance, E,TB, VU by RF at 9/10/2021 1556    Acceptance, E,TB, VU by RF at 9/9/2021 1551    Acceptance, E,TB, VU by RF at 9/8/2021 1602    Acceptance, E,TB, VU by RF at 9/7/2021 1605    Acceptance, E,TB, VU by RF at 9/6/2021 1557    Acceptance, E,TB, VU by RF at 9/3/2021 1532    Acceptance, E,TB, VU by DG at 9/2/2021 2003    Acceptance, E,TB, VU by RF at 9/2/2021 1610    Acceptance, E,TB, VU by DG at 9/1/2021 2256    Acceptance, E,TB, VU by RF at 9/1/2021 1548    Acceptance, E,TB, VU by RF at 8/31/2021 1423    Acceptance, E,TB, VU by RF at 8/30/2021 1548                   Point: Body mechanics (Done)     Learning Progress Summary           Patient Acceptance, E,TB, VU by DG at 9/16/2021 2222    Acceptance, E,TB, VU by DG at 9/15/2021 2303    Acceptance, E,TB, VU by RF at 9/15/2021 1603    Acceptance, E,TB, VU by RF at 9/14/2021 1614     Acceptance, E, VU,NR by LB at 9/13/2021 1121    Acceptance, E,TB, VU by RF at 9/10/2021 1556    Acceptance, E,TB, VU by RF at 9/9/2021 1551    Acceptance, E,TB, VU by RF at 9/8/2021 1602    Acceptance, E,TB, VU by RF at 9/7/2021 1605    Acceptance, E,TB, VU by RF at 9/6/2021 1557    Acceptance, E,TB, VU by RF at 9/3/2021 1532    Acceptance, E,TB, VU by DG at 9/2/2021 2003    Acceptance, E,TB, VU by RF at 9/2/2021 1610    Acceptance, E,TB, VU by DG at 9/1/2021 2256    Acceptance, E,TB, VU by RF at 9/1/2021 1548    Acceptance, E,TB, VU by RF at 8/31/2021 1423    Acceptance, E,TB, VU by RF at 8/30/2021 1548                   Point: Precautions (Done)     Learning Progress Summary           Patient Acceptance, E,TB, VU by DG at 9/16/2021 2222    Acceptance, E,TB, VU by DG at 9/15/2021 2303    Acceptance, E,TB, VU by RF at 9/15/2021 1603    Acceptance, E,TB, VU by RF at 9/14/2021 1614    Acceptance, E, VU,NR by LB at 9/13/2021 1121    Acceptance, E,TB, VU by RF at 9/10/2021 1556    Acceptance, E,TB, VU by RF at 9/9/2021 1551    Acceptance, E,TB, VU by RF at 9/8/2021 1602    Acceptance, E,TB, VU by RF at 9/7/2021 1605    Acceptance, E,TB, VU by RF at 9/6/2021 1557    Acceptance, E,TB, VU by RF at 9/3/2021 1532    Acceptance, E,TB, VU by DG at 9/2/2021 2003    Acceptance, E,TB, VU by RF at 9/2/2021 1610    Acceptance, E,TB, VU by DG at 9/1/2021 2256    Acceptance, E,TB, VU by RF at 9/1/2021 1548    Acceptance, E,TB, VU by RF at 8/31/2021 1423    Acceptance, E,TB, VU by RF at 8/30/2021 1548                               User Key     Initials Effective Dates Name Provider Type Discipline    DG 06/16/21 -  Melissa Swift, RN Registered Nurse Nurse    LB 06/16/21 -  Anju Reis, PT Physical Therapist PT    RF 06/16/21 -  Malinda Alicea PTA Physical Therapy Assistant PT                PT Recommendation and Plan    Planned Therapy Interventions (PT): balance training, bed mobility training, gait training, home  exercise program, neuromuscular re-education, patient/family education, postural re-education, ROM (range of motion), strengthening, transfer training  Frequency of Treatment (PT): 5 times per week     Daily Progress Summary (PT)  Daily Progress Summary (PT): Pt is progressing with ambulation and sitting balance independence. Continue with therapy.                Time Calculation:     PT Charges     Row Name 09/17/21 1518 09/17/21 1517          Time Calculation    Start Time  1255  -KH  1040  -KH     Stop Time  1320 PM session  -KH  1145 AM session  -KH     Time Calculation (min)  25 min  -KH  65 min  -KH     PT Received On  09/17/21  -KH  09/17/21  -       User Key  (r) = Recorded By, (t) = Taken By, (c) = Cosigned By    Initials Name Provider Type    Lani Reyes, PT Physical Therapist          Therapy Charges for Today     Code Description Service Date Service Provider Modifiers Qty    79596155552 HC GAIT TRAINING EA 15 MIN 9/16/2021 Lani Austin, PT GP, KX 2    60019924877 HC PT THER PROC EA 15 MIN 9/16/2021 Lani Austin, PT GP, KX 2    11781642750 HC PT THERAPEUTIC ACT EA 15 MIN 9/16/2021 Lani Austin, PT GP, KX 2    77833390775 HC GAIT TRAINING EA 15 MIN 9/17/2021 Lani Austin, PT GP, KX 1    98168694772 HC PT THER PROC EA 15 MIN 9/17/2021 Lani Austin, PT GP, KX 2    36319312320 HC PT THERAPEUTIC ACT EA 15 MIN 9/17/2021 Lani Austin PT GP, KX 2    82853623823 HC PT NEUROMUSC RE EDUCATION EA 15 MIN 9/17/2021 Lani Austin, PT GP, KX 1                   Lani Austin PT  9/17/2021

## 2021-09-17 NOTE — PROGRESS NOTES
Rehabilitation Nursing  Inpatient Rehabilitation Plan of Care Note    Plan of Care  Copy from [facility]    Pain Management (Active)  Current Status (8/27/2021 10:38:00 AM): potential for pain  Weekly Goal: No pain this week  Discharge Goal: No pain    Safety    Potential for Injury (Active)  Current Status (8/27/2021 10:38:00 AM): At risk for injury  Weekly Goal: No injury this week  Discharge Goal: No injuries    Signed by: Melissa Swift, Nurse

## 2021-09-18 LAB
GLUCOSE BLDC GLUCOMTR-MCNC: 156 MG/DL (ref 70–130)
GLUCOSE BLDC GLUCOMTR-MCNC: 227 MG/DL (ref 70–130)
GLUCOSE BLDC GLUCOMTR-MCNC: 234 MG/DL (ref 70–130)
GLUCOSE BLDC GLUCOMTR-MCNC: 275 MG/DL (ref 70–130)

## 2021-09-18 PROCEDURE — 63710000001 INSULIN DETEMIR PER 5 UNITS: Performed by: INTERNAL MEDICINE

## 2021-09-18 PROCEDURE — 82962 GLUCOSE BLOOD TEST: CPT

## 2021-09-18 PROCEDURE — 63710000001 INSULIN ASPART PER 5 UNITS: Performed by: INTERNAL MEDICINE

## 2021-09-18 RX ADMIN — Medication 10 MG: at 20:19

## 2021-09-18 RX ADMIN — APIXABAN 5 MG: 5 TABLET, FILM COATED ORAL at 20:19

## 2021-09-18 RX ADMIN — CARVEDILOL 12.5 MG: 6.25 TABLET, FILM COATED ORAL at 17:13

## 2021-09-18 RX ADMIN — DOCUSATE SODIUM 50 MG AND SENNOSIDES 8.6 MG 1 TABLET: 8.6; 5 TABLET, FILM COATED ORAL at 20:18

## 2021-09-18 RX ADMIN — INSULIN ASPART 3 UNITS: 100 INJECTION, SOLUTION INTRAVENOUS; SUBCUTANEOUS at 11:42

## 2021-09-18 RX ADMIN — ATORVASTATIN CALCIUM 80 MG: 40 TABLET, FILM COATED ORAL at 20:19

## 2021-09-18 RX ADMIN — INSULIN DETEMIR 20 UNITS: 100 INJECTION, SOLUTION SUBCUTANEOUS at 08:11

## 2021-09-18 RX ADMIN — PANTOPRAZOLE SODIUM 40 MG: 40 TABLET, DELAYED RELEASE ORAL at 06:09

## 2021-09-18 RX ADMIN — APIXABAN 5 MG: 5 TABLET, FILM COATED ORAL at 08:43

## 2021-09-18 RX ADMIN — KETOTIFEN FUMARATE 1 DROP: 0.35 SOLUTION/ DROPS OPHTHALMIC at 20:18

## 2021-09-18 RX ADMIN — ACETAMINOPHEN 650 MG: 325 TABLET ORAL at 20:19

## 2021-09-18 RX ADMIN — AMLODIPINE BESYLATE 10 MG: 10 TABLET ORAL at 20:18

## 2021-09-18 RX ADMIN — INSULIN DETEMIR 20 UNITS: 100 INJECTION, SOLUTION SUBCUTANEOUS at 20:19

## 2021-09-18 RX ADMIN — LEVOTHYROXINE SODIUM 50 MCG: 50 TABLET ORAL at 06:09

## 2021-09-18 RX ADMIN — Medication 1000 MCG: at 08:43

## 2021-09-18 RX ADMIN — LINAGLIPTIN 5 MG: 5 TABLET, FILM COATED ORAL at 08:43

## 2021-09-18 RX ADMIN — Medication 1 TABLET: at 08:43

## 2021-09-18 RX ADMIN — DOCUSATE SODIUM 50 MG AND SENNOSIDES 8.6 MG 1 TABLET: 8.6; 5 TABLET, FILM COATED ORAL at 08:43

## 2021-09-18 RX ADMIN — INSULIN ASPART 2 UNITS: 100 INJECTION, SOLUTION INTRAVENOUS; SUBCUTANEOUS at 08:43

## 2021-09-18 RX ADMIN — AMLODIPINE BESYLATE 10 MG: 10 TABLET ORAL at 08:43

## 2021-09-18 RX ADMIN — SERTRALINE 50 MG: 50 TABLET, FILM COATED ORAL at 08:43

## 2021-09-18 RX ADMIN — CARVEDILOL 12.5 MG: 6.25 TABLET, FILM COATED ORAL at 08:43

## 2021-09-18 RX ADMIN — CHLORTHALIDONE 25 MG: 50 TABLET ORAL at 08:43

## 2021-09-18 RX ADMIN — OXYCODONE HYDROCHLORIDE AND ACETAMINOPHEN 500 MG: 500 TABLET ORAL at 08:43

## 2021-09-18 RX ADMIN — INSULIN ASPART 3 UNITS: 100 INJECTION, SOLUTION INTRAVENOUS; SUBCUTANEOUS at 17:13

## 2021-09-18 RX ADMIN — KETOTIFEN FUMARATE 1 DROP: 0.35 SOLUTION/ DROPS OPHTHALMIC at 17:13

## 2021-09-18 RX ADMIN — KETOTIFEN FUMARATE 1 DROP: 0.35 SOLUTION/ DROPS OPHTHALMIC at 08:43

## 2021-09-18 NOTE — PROGRESS NOTES
Rehabilitation Nursing  Inpatient Rehabilitation Plan of Care Note    Plan of Care  Copy from [facility]    Pain Management (Active)  Current Status (8/27/2021 10:38:00 AM): potential for pain  Weekly Goal: No pain this week  Discharge Goal: No pain    Safety    Potential for Injury (Active)  Current Status (8/27/2021 10:38:00 AM): At risk for injury  Weekly Goal: No injury this week  Discharge Goal: No injuries    Signed by: Yazmin Gresham, Nurse

## 2021-09-19 LAB
ALBUMIN SERPL-MCNC: 3.41 G/DL (ref 3.5–5.2)
ALBUMIN/GLOB SERPL: 1.4 G/DL
ALP SERPL-CCNC: 87 U/L (ref 39–117)
ALT SERPL W P-5'-P-CCNC: 20 U/L (ref 1–33)
ANION GAP SERPL CALCULATED.3IONS-SCNC: 9.8 MMOL/L (ref 5–15)
AST SERPL-CCNC: 22 U/L (ref 1–32)
BASOPHILS # BLD AUTO: 0.1 10*3/MM3 (ref 0–0.2)
BASOPHILS NFR BLD AUTO: 1 % (ref 0–1.5)
BILIRUB SERPL-MCNC: 0.3 MG/DL (ref 0–1.2)
BUN SERPL-MCNC: 31 MG/DL (ref 8–23)
BUN/CREAT SERPL: 26.3 (ref 7–25)
CALCIUM SPEC-SCNC: 9.4 MG/DL (ref 8.6–10.5)
CHLORIDE SERPL-SCNC: 100 MMOL/L (ref 98–107)
CO2 SERPL-SCNC: 31.2 MMOL/L (ref 22–29)
CREAT SERPL-MCNC: 1.18 MG/DL (ref 0.57–1)
DEPRECATED RDW RBC AUTO: 45.6 FL (ref 37–54)
EOSINOPHIL # BLD AUTO: 0.48 10*3/MM3 (ref 0–0.4)
EOSINOPHIL NFR BLD AUTO: 4.7 % (ref 0.3–6.2)
ERYTHROCYTE [DISTWIDTH] IN BLOOD BY AUTOMATED COUNT: 14.6 % (ref 12.3–15.4)
GFR SERPL CREATININE-BSD FRML MDRD: 44 ML/MIN/1.73
GLOBULIN UR ELPH-MCNC: 2.5 GM/DL
GLUCOSE BLDC GLUCOMTR-MCNC: 144 MG/DL (ref 70–130)
GLUCOSE BLDC GLUCOMTR-MCNC: 167 MG/DL (ref 70–130)
GLUCOSE BLDC GLUCOMTR-MCNC: 233 MG/DL (ref 70–130)
GLUCOSE BLDC GLUCOMTR-MCNC: 256 MG/DL (ref 70–130)
GLUCOSE SERPL-MCNC: 158 MG/DL (ref 65–99)
HCT VFR BLD AUTO: 38.8 % (ref 34–46.6)
HGB BLD-MCNC: 13 G/DL (ref 12–15.9)
IMM GRANULOCYTES # BLD AUTO: 0.03 10*3/MM3 (ref 0–0.05)
IMM GRANULOCYTES NFR BLD AUTO: 0.3 % (ref 0–0.5)
LYMPHOCYTES # BLD AUTO: 2.84 10*3/MM3 (ref 0.7–3.1)
LYMPHOCYTES NFR BLD AUTO: 27.7 % (ref 19.6–45.3)
MAGNESIUM SERPL-MCNC: 2 MG/DL (ref 1.6–2.4)
MCH RBC QN AUTO: 30.4 PG (ref 26.6–33)
MCHC RBC AUTO-ENTMCNC: 33.5 G/DL (ref 31.5–35.7)
MCV RBC AUTO: 90.9 FL (ref 79–97)
MONOCYTES # BLD AUTO: 0.8 10*3/MM3 (ref 0.1–0.9)
MONOCYTES NFR BLD AUTO: 7.8 % (ref 5–12)
NEUTROPHILS NFR BLD AUTO: 58.5 % (ref 42.7–76)
NEUTROPHILS NFR BLD AUTO: 6 10*3/MM3 (ref 1.7–7)
NRBC BLD AUTO-RTO: 0 /100 WBC (ref 0–0.2)
PLATELET # BLD AUTO: 257 10*3/MM3 (ref 140–450)
PMV BLD AUTO: 10.7 FL (ref 6–12)
POTASSIUM SERPL-SCNC: 3.7 MMOL/L (ref 3.5–5.2)
PROT SERPL-MCNC: 5.9 G/DL (ref 6–8.5)
RBC # BLD AUTO: 4.27 10*6/MM3 (ref 3.77–5.28)
SODIUM SERPL-SCNC: 141 MMOL/L (ref 136–145)
WBC # BLD AUTO: 10.25 10*3/MM3 (ref 3.4–10.8)

## 2021-09-19 PROCEDURE — 85025 COMPLETE CBC W/AUTO DIFF WBC: CPT | Performed by: INTERNAL MEDICINE

## 2021-09-19 PROCEDURE — 63710000001 INSULIN ASPART PER 5 UNITS: Performed by: INTERNAL MEDICINE

## 2021-09-19 PROCEDURE — 80053 COMPREHEN METABOLIC PANEL: CPT | Performed by: INTERNAL MEDICINE

## 2021-09-19 PROCEDURE — 82962 GLUCOSE BLOOD TEST: CPT

## 2021-09-19 PROCEDURE — 63710000001 INSULIN DETEMIR PER 5 UNITS: Performed by: INTERNAL MEDICINE

## 2021-09-19 PROCEDURE — 83735 ASSAY OF MAGNESIUM: CPT | Performed by: INTERNAL MEDICINE

## 2021-09-19 RX ADMIN — SERTRALINE 50 MG: 50 TABLET, FILM COATED ORAL at 09:00

## 2021-09-19 RX ADMIN — DOCUSATE SODIUM 50 MG AND SENNOSIDES 8.6 MG 1 TABLET: 8.6; 5 TABLET, FILM COATED ORAL at 09:02

## 2021-09-19 RX ADMIN — INSULIN DETEMIR 20 UNITS: 100 INJECTION, SOLUTION SUBCUTANEOUS at 09:02

## 2021-09-19 RX ADMIN — DOCUSATE SODIUM 50 MG AND SENNOSIDES 8.6 MG 1 TABLET: 8.6; 5 TABLET, FILM COATED ORAL at 21:05

## 2021-09-19 RX ADMIN — PANTOPRAZOLE SODIUM 40 MG: 40 TABLET, DELAYED RELEASE ORAL at 05:02

## 2021-09-19 RX ADMIN — Medication 1 TABLET: at 09:00

## 2021-09-19 RX ADMIN — Medication 10 MG: at 21:05

## 2021-09-19 RX ADMIN — KETOTIFEN FUMARATE 1 DROP: 0.35 SOLUTION/ DROPS OPHTHALMIC at 09:01

## 2021-09-19 RX ADMIN — INSULIN ASPART 2 UNITS: 100 INJECTION, SOLUTION INTRAVENOUS; SUBCUTANEOUS at 11:44

## 2021-09-19 RX ADMIN — KETOTIFEN FUMARATE 1 DROP: 0.35 SOLUTION/ DROPS OPHTHALMIC at 21:05

## 2021-09-19 RX ADMIN — INSULIN ASPART 3 UNITS: 100 INJECTION, SOLUTION INTRAVENOUS; SUBCUTANEOUS at 17:18

## 2021-09-19 RX ADMIN — ATORVASTATIN CALCIUM 80 MG: 40 TABLET, FILM COATED ORAL at 21:05

## 2021-09-19 RX ADMIN — INSULIN DETEMIR 20 UNITS: 100 INJECTION, SOLUTION SUBCUTANEOUS at 21:05

## 2021-09-19 RX ADMIN — CHLORTHALIDONE 25 MG: 50 TABLET ORAL at 09:01

## 2021-09-19 RX ADMIN — APIXABAN 5 MG: 5 TABLET, FILM COATED ORAL at 21:05

## 2021-09-19 RX ADMIN — Medication 1000 MCG: at 09:00

## 2021-09-19 RX ADMIN — KETOTIFEN FUMARATE 1 DROP: 0.35 SOLUTION/ DROPS OPHTHALMIC at 17:18

## 2021-09-19 RX ADMIN — OXYCODONE HYDROCHLORIDE AND ACETAMINOPHEN 500 MG: 500 TABLET ORAL at 09:00

## 2021-09-19 RX ADMIN — LEVOTHYROXINE SODIUM 50 MCG: 50 TABLET ORAL at 05:02

## 2021-09-19 RX ADMIN — LINAGLIPTIN 5 MG: 5 TABLET, FILM COATED ORAL at 09:01

## 2021-09-19 RX ADMIN — APIXABAN 5 MG: 5 TABLET, FILM COATED ORAL at 09:00

## 2021-09-20 LAB
GLUCOSE BLDC GLUCOMTR-MCNC: 141 MG/DL (ref 70–130)
GLUCOSE BLDC GLUCOMTR-MCNC: 204 MG/DL (ref 70–130)
GLUCOSE BLDC GLUCOMTR-MCNC: 219 MG/DL (ref 70–130)
GLUCOSE BLDC GLUCOMTR-MCNC: 308 MG/DL (ref 70–130)

## 2021-09-20 PROCEDURE — 97535 SELF CARE MNGMENT TRAINING: CPT | Performed by: OCCUPATIONAL THERAPIST

## 2021-09-20 PROCEDURE — 82962 GLUCOSE BLOOD TEST: CPT

## 2021-09-20 PROCEDURE — 99231 SBSQ HOSP IP/OBS SF/LOW 25: CPT | Performed by: FAMILY MEDICINE

## 2021-09-20 PROCEDURE — 97032 APPL MODALITY 1+ESTIM EA 15: CPT | Performed by: OCCUPATIONAL THERAPIST

## 2021-09-20 PROCEDURE — 63710000001 INSULIN ASPART PER 5 UNITS: Performed by: INTERNAL MEDICINE

## 2021-09-20 PROCEDURE — 97110 THERAPEUTIC EXERCISES: CPT

## 2021-09-20 PROCEDURE — 97112 NEUROMUSCULAR REEDUCATION: CPT | Performed by: OCCUPATIONAL THERAPIST

## 2021-09-20 PROCEDURE — 63710000001 INSULIN DETEMIR PER 5 UNITS: Performed by: INTERNAL MEDICINE

## 2021-09-20 PROCEDURE — 97116 GAIT TRAINING THERAPY: CPT

## 2021-09-20 PROCEDURE — 97112 NEUROMUSCULAR REEDUCATION: CPT

## 2021-09-20 RX ADMIN — Medication 1 TABLET: at 08:04

## 2021-09-20 RX ADMIN — KETOTIFEN FUMARATE 1 DROP: 0.35 SOLUTION/ DROPS OPHTHALMIC at 17:27

## 2021-09-20 RX ADMIN — PANTOPRAZOLE SODIUM 40 MG: 40 TABLET, DELAYED RELEASE ORAL at 05:44

## 2021-09-20 RX ADMIN — LINAGLIPTIN 5 MG: 5 TABLET, FILM COATED ORAL at 08:04

## 2021-09-20 RX ADMIN — Medication 1000 MCG: at 09:48

## 2021-09-20 RX ADMIN — APIXABAN 5 MG: 5 TABLET, FILM COATED ORAL at 21:32

## 2021-09-20 RX ADMIN — DOCUSATE SODIUM 50 MG AND SENNOSIDES 8.6 MG 1 TABLET: 8.6; 5 TABLET, FILM COATED ORAL at 08:04

## 2021-09-20 RX ADMIN — DOCUSATE SODIUM 50 MG AND SENNOSIDES 8.6 MG 1 TABLET: 8.6; 5 TABLET, FILM COATED ORAL at 21:32

## 2021-09-20 RX ADMIN — KETOTIFEN FUMARATE 1 DROP: 0.35 SOLUTION/ DROPS OPHTHALMIC at 08:07

## 2021-09-20 RX ADMIN — INSULIN ASPART 5 UNITS: 100 INJECTION, SOLUTION INTRAVENOUS; SUBCUTANEOUS at 12:03

## 2021-09-20 RX ADMIN — INSULIN ASPART 3 UNITS: 100 INJECTION, SOLUTION INTRAVENOUS; SUBCUTANEOUS at 17:27

## 2021-09-20 RX ADMIN — ATORVASTATIN CALCIUM 80 MG: 40 TABLET, FILM COATED ORAL at 21:31

## 2021-09-20 RX ADMIN — SERTRALINE 50 MG: 50 TABLET, FILM COATED ORAL at 08:04

## 2021-09-20 RX ADMIN — INSULIN DETEMIR 20 UNITS: 100 INJECTION, SOLUTION SUBCUTANEOUS at 21:32

## 2021-09-20 RX ADMIN — OXYCODONE HYDROCHLORIDE AND ACETAMINOPHEN 500 MG: 500 TABLET ORAL at 09:48

## 2021-09-20 RX ADMIN — BISACODYL 10 MG: 5 TABLET ORAL at 12:11

## 2021-09-20 RX ADMIN — APIXABAN 5 MG: 5 TABLET, FILM COATED ORAL at 08:05

## 2021-09-20 RX ADMIN — INSULIN DETEMIR 20 UNITS: 100 INJECTION, SOLUTION SUBCUTANEOUS at 09:48

## 2021-09-20 RX ADMIN — LEVOTHYROXINE SODIUM 50 MCG: 50 TABLET ORAL at 05:44

## 2021-09-20 RX ADMIN — KETOTIFEN FUMARATE 1 DROP: 0.35 SOLUTION/ DROPS OPHTHALMIC at 21:32

## 2021-09-20 RX ADMIN — CHLORTHALIDONE 25 MG: 50 TABLET ORAL at 08:05

## 2021-09-20 RX ADMIN — Medication 10 MG: at 21:31

## 2021-09-20 NOTE — THERAPY TREATMENT NOTE
Inpatient Rehabilitation - Physical Therapy Treatment Note        [place]     Patient Name: Joycelyn Brown  : 1940  MRN: [id number]    Today's Date: 2021                    Admit Date: 2021      Visit Dx:     ICD-10-CM ICD-9-CM   1. CVA (cerebral vascular accident) (CMS/HCC)  I63.9 434.91       Patient Active Problem List   Diagnosis   • CVA (cerebral vascular accident) (CMS/HCC)       History reviewed. No pertinent past medical history.    No past surgical history on file.       PT ASSESSMENT (last 12 hours)      IRF PT Evaluation and Treatment     Row Name 21 155          PT Time and Intention    Document Type  daily treatment  -RF     Mode of Treatment  physical therapy;individual therapy  -RF     Patient/Family/Caregiver Comments/Observations  Pt c/o weakness and fatigue this date. Pt requires encouragement for participation.   -RF     Row Name 21 155          General Information    Patient Profile Reviewed  yes  -RF     Existing Precautions/Restrictions  fall;oxygen therapy device and L/min R HP  -RF     Row Name 21 155          Cognition/Psychosocial    Affect/Mental Status (Cognitive)  WFL  -RF     Follows Commands (Cognition)  WFL  -RF     Row Name 21 155          Pain Scale: FACES Pre/Post-Treatment    Pain: FACES Scale, Pretreatment  0-->no hurt  -RF     Posttreatment Pain Rating  0-->no hurt  -RF     Row Name 21 155          Transfers    Sit-Stand [place] (Transfers)  moderate assist (50% patient effort);2 person assist  -RF     Stand-Sit [place] (Transfers)  moderate assist (50% patient effort);2 person assist  -RF     Row Name 21 155          Sit-Stand Transfer    Assistive Device (Sit-Stand Transfers)  other (see comments) parallel bars  -RF     Row Name 21 155          Stand-Sit Transfer    Assistive Device (Stand-Sit Transfers)  other (see comments) parallel bars  -RF     Row Name 21 155          Gait/Stairs  (Locomotion)    Gait/Stairs Locomotion  gait/ambulation assistive device  -RF     [place] Level (Gait)  2 person assist;moderate assist (50% patient effort)  -RF     Assistive Device (Gait)  parallel bars;other (see comments);walker, platform  -RF     Distance in Feet (Gait)  6X3  -RF     Pattern (Gait)  step-to;step-through  -RF     Bilateral Gait Deviations  knee buckling, right side  -RF     Right Sided Gait Deviations  knee buckling, right side;weight shift ability decreased  -RF     Comment (Gait/Stairs)  Pt continually requires cuing for proper technique; little carryover noted. Manual weightshifting required with verbal and tactile cuing.   -RF     Row Name 09/20/21 1552          Safety Issues, Functional Mobility    Impairments Affecting Function (Mobility)  balance;endurance/activity tolerance;muscle tone abnormal;postural/trunk control;strength  -RF     Row Name 09/20/21 1552          Hip (Therapeutic Exercise)    Hip Strengthening (Therapeutic Exercise)  bilateral;flexion;extension;aBduction;aDduction;external rotation;internal rotation;heel slides;marching while seated;sitting;supine;resistance band;red;2 sets;10 repetitions;other (see comments) AAROM on R  -RF     Row Name 09/20/21 1552          Knee (Therapeutic Exercise)    Knee Strengthening (Therapeutic Exercise)  bilateral;flexion;extension;marching while seated;SAQ (short arc quad);LAQ (long arc quad);hamstring curls;sitting;supine;resistance band;red;2 sets;10 repetitions;other (see comments) AAROM on R  -RF     Row Name 09/20/21 1552          Ankle (Therapeutic Exercise)    Ankle Strengthening (Therapeutic Exercise)  bilateral;dorsiflexion;plantarflexion;sitting;supine;2 sets;10 repetitions;other (see comments) AAROM on R  -RF     Row Name 09/20/21 1552          [place] Electrical Stimulation Treatment    Location 1 ([place] E-Stim Treatment)  lower extremity treatment area;other (see comments) R hip flexors  -RF     Indications ([place]  E-Stim Treatment)  enhance muscle contraction  -RF     Treatment Details ([place] Electrical Stimulation Treatment)  15 mins, 45mA, 10/20  -RF     Comment ([place] E-Stim Treatment)  Pt cued for quad contraction with on cyc;le; continuous cuing required for proper technique.   -RF     Row Name 09/20/21 1552          Bed Mobility Goal 1 (PT-IRF)    Activity/Assistive Device (Bed Mobility Goal 1, PT-IRF)  bed mobility activities, all  -RF     [place] Level (Bed Mobility Goal 1, PT-IRF)  standby assist  -RF     Time Frame (Bed Mobility Goal 1, PT-IRF)  long-term goal (LTG)  -RF     Progress/Outcomes (Bed Mobility Goal 1, PT-IRF)  goal ongoing  -RF     Row Name 09/20/21 1552          Transfer Goal 1 (PT-IRF)    Activity/Assistive Device (Transfer Goal 1, PT-IRF)  all transfers  -RF     [place] Level (Transfer Goal 1, PT-IRF)  minimum assist (75% or more patient effort)  -RF     Time Frame (Transfer Goal 1, PT-IRF)  by discharge  -RF     Progress/Outcomes (Transfer Goal 1, PT-IRF)  goal ongoing  -RF     Row Name 09/20/21 1552          Gait/Walking Locomotion Goal 1 (PT-IRF)    Activity/Assistive Device (Gait/Walking Locomotion Goal 1, PT-IRF)  gait (walking locomotion);assistive device use  -RF     Gait/Walking Locomotion Distance Goal 1 (PT-IRF)  5  -RF     [place] Level (Gait/Walking Locomotion Goal 1, PT-IRF)  minimum assist (75% or more patient effort)  -RF     Time Frame (Gait/Walking Locomotion Goal 1, PT-IRF)  long-term goal (LTG)  -RF     Progress/Outcomes (Gait/Walking Locomotion Goal 1, PT-IRF)  goal ongoing  -RF     Row Name 09/20/21 1552          Stairs Goal 1 (PT-IRF)    Activity/Assistive Device (Stairs Goal 1, PT-IRF)  stairs, all skills;ascending stairs;descending stairs;using handrail, left  -RF     Number of Stairs (Stairs Goal 1, PT-IRF)  5  -RF     [place] Level (Stairs Goal 1, PT-IRF)  minimum assist (75% or more patient effort);moderate assist (50-74% patient effort)  -RF      Time Frame (Stairs Goal 1, PT-IRF)  long-term goal (LTG)  -RF     Progress/Outcomes (Stairs Goal 1, PT-IRF)  goal ongoing  -RF     Row Name 09/20/21 1552          Positioning and Restraints    Pre-Treatment Position  sitting in chair/recliner bed in PM  -RF     Post Treatment Position  wheelchair  -RF     In Bed  supine;call light within reach;encouraged to call for assist PM  -RF     In Wheelchair  sitting;call light within reach;encouraged to call for assist AM  -RF     Row Name 09/20/21 1552          Therapy Assessment/Plan (PT)    Rehab Potential/Prognosis (PT)  adequate, monitor progress closely  -RF     Frequency of Treatment (PT)  5 times per week  -RF     Estimated Duration of Therapy (PT)  2 weeks  -RF     Problem List (PT)  problems related to;balance;hemiparesis/hemiplegia;mobility;strength  -RF     Row Name 09/20/21 1552          Daily Progress Summary (PT)    Functional Goal Overall Progress (PT)  progressing toward functional goals slower than expected  -RF     Daily Progress Summary (PT)  Pt requires encouragement for participation and continuous cuing for proper technique. Conitnued skilled care required for further improvements to ensure maximum safe function upon D/C,   -RF     Impairments Still Limiting Function (PT)  sensation decreased;strength decreased;impaired balance;coordination impaired;impaired functional activity tolerance;motor control impaired;postural control impaired;pain  -RF     Recommendations (PT)  Continue per current POC.   -RF       User Key  (r) = Recorded By, (t) = Taken By, (c) = Cosigned By    Initials Name Provider Type    RF Malinda Alicea PTA Physical Therapy Assistant           Physical Therapy Education                 Title: PT OT SLP Therapies (Done)     Topic: Physical Therapy (Done)     Point: Mobility training (Done)     Learning Progress Summary           Patient Acceptance, E,TB, VU by RF at 9/20/2021 1558    Acceptance, E,TB, VU by DG at 9/18/2021 4598     Acceptance, E,TB, VU by DG at 9/18/2021 0014    Acceptance, E,TB, VU by DG at 9/16/2021 2222    Acceptance, E,TB, VU by DG at 9/15/2021 2303    Acceptance, E,TB, VU by RF at 9/15/2021 1603    Acceptance, E,TB, VU by RF at 9/14/2021 1614    Acceptance, E, VU,NR by LB at 9/13/2021 1121    Acceptance, E,TB, VU by RF at 9/10/2021 1556    Acceptance, E,TB, VU by RF at 9/9/2021 1551    Acceptance, E,TB, VU by RF at 9/8/2021 1602    Acceptance, E,TB, VU by RF at 9/7/2021 1605    Acceptance, E,TB, VU by RF at 9/6/2021 1557    Acceptance, E,TB, VU by RF at 9/3/2021 1532    Acceptance, E,TB, VU by DG at 9/2/2021 2003    Acceptance, E,TB, VU by RF at 9/2/2021 1610    Acceptance, E,TB, VU by DG at 9/1/2021 2256    Acceptance, E,TB, VU by RF at 9/1/2021 1548    Acceptance, E,TB, VU by RF at 8/31/2021 1423    Acceptance, E,TB, VU by RF at 8/30/2021 1548                   Point: Home exercise program (Done)     Learning Progress Summary           Patient Acceptance, E,TB, VU by RF at 9/20/2021 1558    Acceptance, E,TB, VU by DG at 9/18/2021 2225    Acceptance, E,TB, VU by DG at 9/18/2021 0014    Acceptance, E,TB, VU by DG at 9/16/2021 2222    Acceptance, E,TB, VU by DG at 9/15/2021 2303    Acceptance, E,TB, VU by RF at 9/15/2021 1603    Acceptance, E,TB, VU by RF at 9/14/2021 1614    Acceptance, E, VU,NR by LB at 9/13/2021 1121    Acceptance, E,TB, VU by RF at 9/10/2021 1556    Acceptance, E,TB, VU by RF at 9/9/2021 1551    Acceptance, E,TB, VU by RF at 9/8/2021 1602    Acceptance, E,TB, VU by RF at 9/7/2021 1605    Acceptance, E,TB, VU by RF at 9/6/2021 1557    Acceptance, E,TB, VU by RF at 9/3/2021 1532    Acceptance, E,TB, VU by DG at 9/2/2021 2003    Acceptance, E,TB, VU by RF at 9/2/2021 1610    Acceptance, E,TB, VU by DG at 9/1/2021 2256    Acceptance, E,TB, VU by RF at 9/1/2021 1548    Acceptance, E,TB, VU by RF at 8/31/2021 1423    Acceptance, E,TB, VU by RF at 8/30/2021 1548                   Point: Body mechanics (Done)      Learning Progress Summary           Patient Acceptance, E,TB, VU by RF at 9/20/2021 1558    Acceptance, E,TB, VU by DG at 9/18/2021 2225    Acceptance, E,TB, VU by DG at 9/18/2021 0014    Acceptance, E,TB, VU by DG at 9/16/2021 2222    Acceptance, E,TB, VU by DG at 9/15/2021 2303    Acceptance, E,TB, VU by RF at 9/15/2021 1603    Acceptance, E,TB, VU by RF at 9/14/2021 1614    Acceptance, E, VU,NR by LB at 9/13/2021 1121    Acceptance, E,TB, VU by RF at 9/10/2021 1556    Acceptance, E,TB, VU by RF at 9/9/2021 1551    Acceptance, E,TB, VU by RF at 9/8/2021 1602    Acceptance, E,TB, VU by RF at 9/7/2021 1605    Acceptance, E,TB, VU by RF at 9/6/2021 1557    Acceptance, E,TB, VU by RF at 9/3/2021 1532    Acceptance, E,TB, VU by DG at 9/2/2021 2003    Acceptance, E,TB, VU by RF at 9/2/2021 1610    Acceptance, E,TB, VU by DG at 9/1/2021 2256    Acceptance, E,TB, VU by RF at 9/1/2021 1548    Acceptance, E,TB, VU by RF at 8/31/2021 1423    Acceptance, E,TB, VU by RF at 8/30/2021 1548                   Point: Precautions (Done)     Learning Progress Summary           Patient Acceptance, E,TB, VU by RF at 9/20/2021 1558    Acceptance, E,TB, VU by DG at 9/18/2021 2225    Acceptance, E,TB, VU by DG at 9/18/2021 0014    Acceptance, E,TB, VU by DG at 9/16/2021 2222    Acceptance, E,TB, VU by DG at 9/15/2021 2303    Acceptance, E,TB, VU by RF at 9/15/2021 1603    Acceptance, E,TB, VU by RF at 9/14/2021 1614    Acceptance, E, VU,NR by LB at 9/13/2021 1121    Acceptance, E,TB, VU by RF at 9/10/2021 1556    Acceptance, E,TB, VU by RF at 9/9/2021 1551    Acceptance, E,TB, VU by RF at 9/8/2021 1602    Acceptance, E,TB, VU by RF at 9/7/2021 1605    Acceptance, E,TB, VU by RF at 9/6/2021 1557    Acceptance, E,TB, VU by RF at 9/3/2021 1532    Acceptance, E,TB, VU by DG at 9/2/2021 2003    Acceptance, E,TB, VU by RF at 9/2/2021 1610    Acceptance, E,TB, VU by DG at 9/1/2021 2256    Acceptance, E,TB, VU by RF at 9/1/2021 1548     Acceptance, E,TB, VU by RF at 8/31/2021 1423    Acceptance, E,TB, VU by RF at 8/30/2021 1548                               User Key     Initials Effective Dates Name Provider Type Discipline    DG 06/16/21 -  Melissa Swift, RN Registered Nurse Nurse     06/16/21 -  Anju Reis, PT Physical Therapist PT    RF 06/16/21 -  Malinda Alicea PTA Physical Therapy Assistant PT                PT Recommendation and Plan    Frequency of Treatment (PT): 5 times per week     Daily Progress Summary (PT)  Functional Goal Overall Progress (PT): progressing toward functional goals slower than expected  Daily Progress Summary (PT): Pt requires encouragement for participation and continuous cuing for proper technique. Conitnued skilled care required for further improvements to ensure maximum safe function upon D/C,   Impairments Still Limiting Function (PT): sensation decreased, strength decreased, impaired balance, coordination impaired, impaired functional activity tolerance, motor control impaired, postural control impaired, pain  Recommendations (PT): Continue per current POC.                Time Calculation:     PT Charges     Row Name 09/20/21 1559 09/20/21 1558          Time Calculation    Start Time  1330  -RF  0915  -RF     Stop Time  1415  -RF  1000  -RF     Time Calculation (min)  45 min  -RF  45 min  -RF     PT Received On  09/20/21  -RF  09/20/21  -RF     PT - Next Appointment  09/21/21  -RF  09/20/21  -RF     PT Goal Re-Cert Due Date  10/01/21  -RF  10/01/21  -RF        Time Calculation- PT    Total Timed Code Minutes- PT  45 minute(s)  -RF  45 minute(s)  -RF       User Key  (r) = Recorded By, (t) = Taken By, (c) = Cosigned By    Initials Name Provider Type    RF Malinda Alicea PTA Physical Therapy Assistant          Therapy Charges for Today     Code Description Service Date Service Provider Modifiers Qty    85765761224 HC GAIT TRAINING EA 15 MIN 9/20/2021 Malinda Alicea PTA GP, KX 2     [id number]  PT THER PROC EA 15 MIN 9/20/2021 Malinda Alicea, PTA GP, KX 3    [id number]  PT NEUROMUSC RE EDUCATION EA 15 MIN 9/20/2021 Malinda Alicea, PTA GP, KX 1                   Malinda Alicea, CROW  9/20/2021

## 2021-09-20 NOTE — THERAPY TREATMENT NOTE
Inpatient Rehabilitation - Occupational Therapy Treatment Note     [place]     Patient Name: Joycelyn Brown  : 1940  MRN: [id number]    Today's Date: 2021                 Admit Date: 2021         ICD-10-CM ICD-9-CM   1. CVA (cerebral vascular accident) (CMS/[facility])  I63.9 434.91       Patient Active Problem List   Diagnosis   • CVA (cerebral vascular accident) (CMS/[facility])       History reviewed. No pertinent past medical history.    No past surgical history on file.             IRF OT ASSESSMENT FLOWSHEET (last 12 hours)      IRF OT Evaluation and Treatment     Row Name 21 1400          OT Time and Intention    Document Type  daily treatment  -     Mode of Treatment  individual therapy;occupational therapy  -     Patient Effort  adequate  -     Row Name 21 1400          General Information    Patient/Family/Caregiver Comments/Observations  patient agreeable to therapy this am  -     Existing Precautions/Restrictions  fall;oxygen therapy device and L/min  -     Row Name 21 1400          Bed Mobility    Supine-Sit [place] (Bed Mobility)  maximum assist (25% patient effort);1 person assist  -     Row Name 21 1400          Transfers    Bed-Chair [place] (Transfers)  maximum assist (25% patient effort);2 person assist  -     Row Name 21 1400          Motor Skills    Motor Skills  functional endurance;neuro-muscular function  -     Therapeutic Exercise  -- RUE AROM, AAROM, PROM, grasp and release  -     Row Name 21 1400          Lower Body Dressing    [place] Level (Lower Body Dressing)  don;shoes/slippers;pants/bottoms;maximum assist (25% patient effort)  -     Row Name 21 1400          Modality Intervention (Comprehensive)    Modality Treatment  NMES/FES/[place]  -     Additional Documentation  -- right wrist and digit extension  -       User Key  (r) = Recorded By, (t) = Taken By, (c) = Cosigned By    Initials Name Effective  Maria Guadalupe Zee, OT 06/16/21 -            Occupational Therapy Education                 Title: PT OT SLP Therapies (Done)     Topic: Occupational Therapy (Done)     Point: ADL training (Done)     Description:   Instruct learner(s) on proper safety adaptation and remediation techniques during self care or transfers.   Instruct in proper use of assistive devices.              Learning Progress Summary           Patient Acceptance, E,TB, VU by DG at 9/18/2021 2225    Acceptance, E,TB, VU by DG at 9/18/2021 0014    Acceptance, E, VU,NR by  at 9/17/2021 1456    Acceptance, E,TB, VU by DG at 9/16/2021 2222    Acceptance, E,TB, VU by DG at 9/15/2021 2303    Acceptance, E,TB, VU by DG at 9/2/2021 2003    Acceptance, E,D, VU,NR by  at 9/2/2021 1458    Acceptance, E,TB, VU by DG at 9/1/2021 2256    Acceptance, E,D, VU,NR by  at 9/1/2021 1520    Acceptance, E,D, VU,NR by  at 8/31/2021 1541    Acceptance, E, VU,NR by  at 8/30/2021 1507    Acceptance, E,TB, VU by DG at 8/28/2021 2040    Acceptance, E,D, VU,NR by AB at 8/28/2021 1410                   Point: Home exercise program (Done)     Description:   Instruct learner(s) on appropriate technique for monitoring, assisting and/or progressing therapeutic exercises/activities.              Learning Progress Summary           Patient Acceptance, E,TB, VU by DG at 9/18/2021 2225    Acceptance, E,TB, VU by DG at 9/18/2021 0014    Acceptance, E,TB, VU by DG at 9/16/2021 2222    Acceptance, E,TB, VU by DG at 9/15/2021 2303    Acceptance, E,TB, VU by DG at 9/2/2021 2003    Acceptance, E,TB, VU by DG at 9/1/2021 2256    Acceptance, E,TB, VU by DG at 8/28/2021 2040                   Point: Precautions (Done)     Description:   Instruct learner(s) on prescribed precautions during self-care and functional transfers.              Learning Progress Summary           Patient Acceptance, E,TB, VU by DG at 9/18/2021 2225    Acceptance, E,TB, VU by DG at 9/18/2021  0014    Acceptance, E, VU,NR by BF at 9/17/2021 1456    Acceptance, E,TB, VU by  at 9/16/2021 2222    Acceptance, E,TB, VU by DG at 9/15/2021 2303    Acceptance, E,TB, VU by DG at 9/2/2021 2003    Acceptance, E,D, VU,NR by  at 9/2/2021 1458    Acceptance, E,TB, VU by  at 9/1/2021 2256    Acceptance, E,D, VU,NR by  at 9/1/2021 1520    Acceptance, E,D, VU,NR by  at 8/31/2021 1541    Acceptance, E, VU,NR by BF at 8/30/2021 1507    Acceptance, E,TB, VU by  at 8/28/2021 2040    Acceptance, E,D, VU,NR by AB at 8/28/2021 1410                               User Key     Initials Effective Dates Name Provider Type [facility] 06/16/21 -  Yazmin Chaves, OT Occupational Therapist OT     06/16/21 -  Melissa Swift, RN Registered Nurse Nurse     06/16/21 -  Debbie Mehta, OT Occupational Therapist OT     06/16/21 -  Lucrecia Andrews OT Occupational Therapist OT                    OT Recommendation and Plan                         Time Calculation:     Time Calculation- OT     Row Name 09/20/21 1454             Time Calculation- OT    OT Start Time  0745  -      OT Stop Time  0915  -      OT Time Calculation (min)  90 min  -        User Key  (r) = Recorded By, (t) = Taken By, (c) = Cosigned By    Initials Name Provider Type     Maria Guadalupe Monge, OT Occupational Therapist        Therapy Charges for Today     Code Description Service Date Service Provider Modifiers Qty    58075538670 HC OT SELF CARE/MGMT/TRAIN EA 15 MIN 9/20/2021 Maria Guadalupe Monge OT GO KX 2    27855666135 HC OT NEUROMUSC RE EDUCATION EA 15 MIN 9/20/2021 Maria Guadalupe Monge OT GO KX 3    64410863042 HC OT ELEC STIM EA-PER 15 MIN 9/20/2021 Maria Guadalupe Monge OT GO KX 1                   Maria Guadalupe Monge OT  9/20/2021

## 2021-09-20 NOTE — PROGRESS NOTES
Inpatient Rehabilitation Functional Measures Assessment and Plan of Care    Plan of Care  Self Care    [OT] Dressing (Upper)(Active)  Current Status(09/20/2021): max/mod  Weekly Goal(09/21/2021): mod  Discharge Goal: ModA    Functional Measures  PAT Eating:  PAT Grooming:  PAT Bathing:  PAT Upper Body Dressing:  PAT Lower Body Dressing:  PAT Toileting:    PAT Bladder Management  Level of Assistance:  Frequency/Number of Accidents this Shift:    PAT Bowel Management  Level of Assistance:  Frequency/Number of Accidents this Shift:    PAT Bed/Chair/Wheelchair Transfer:  PAT Toilet Transfer:  PAT Tub/Shower Transfer:    Previously Documented Mode of Locomotion at Discharge:  [facility] Expected Mode of Locomotion at Discharge:  PAT Walk/Wheelchair:  PAT Stairs:    PAT Comprehension:  PAT Expression:  PAT Social Interaction:  [facility] Problem Solving:  PAT Memory:    Therapy Mode Minutes  Occupational Therapy: Individual: 90 minutes.  Physical Therapy:  Speech Language Pathology:    Discharge Functional Goals:    Signed by: Tiff Monge, Occupational Therapist

## 2021-09-20 NOTE — PROGRESS NOTES
[facility]  PROGRESS NOTE     Patient Identification:  Name:  Joycelyn Brown  Age:  81 y.o.  Sex:  female  :  1940  MRN:  [id number]  Visit Number:  [id number]  ROOM: 110/1S     Primary Care Provider:  Donya Looney APRN    Length of stay in inpatient status:  24    Subjective     Chief Compliant:  No chief complaint on file.      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  Patient states that she seems to be making some improvements and she is feeling reasonably well.  Her diabetes is suboptimal currently controlled.  Has no new complaints otherwise    Objective     Current Hospital Meds:amLODIPine, 10 mg, Oral, BID  apixaban, 5 mg, Oral, Q12H  ascorbic acid, 500 mg, Oral, Daily  atorvastatin, 80 mg, Oral, Nightly  carvedilol, 12.5 mg, Oral, BID With Meals  chlorthalidone, 25 mg, Oral, Daily  insulin aspart, 0-7 Units, Subcutaneous, TID AC  insulin detemir, 20 Units, Subcutaneous, Q12H  ketotifen, 1 drop, Both Eyes, TID  levothyroxine, 50 mcg, Oral, Q AM  linagliptin, 5 mg, Oral, Daily  melatonin, 10 mg, Oral, Nightly  multivitamin with minerals, 1 tablet, Oral, Daily  pantoprazole, 40 mg, Oral, Q AM  senna-docusate sodium, 1 tablet, Oral, BID  sertraline, 50 mg, Oral, Daily  vitamin B-12, 1,000 mcg, Oral, Daily       ----------------------------------------------------------------------------------------------------------------------  Vital Signs:  Temp:  [97.7 °F (36.5 °C)] 97.7 °F (36.5 °C)  Heart Rate:  [60-61] 60  Resp:  [18] 18  BP: (116-125)/(62-69) 116/62  SpO2:  [97 %] 97 %  on  Flow (L/min):  [3] 3;   Device (Oxygen Therapy): nasal cannula  Body mass index is 28.19 kg/m².    Wt Readings from Last 3 Encounters:   21 81.6 kg (180 lb)   02/10/15 81.7 kg (180 lb 0.1 oz)     Intake & Output (last 3 days)         0700    P.O.  480    Total Intake(mL/kg) 360 (4.4) 680 (8.3) 1140 (14) 480 (5.9)    Net +360 +680 +1140 +480            Urine Unmeasured Occurrence 6 x 5 x 9 x 2 x    Stool Unmeasured Occurrence 4 x 3 x          Diet Soft Texture; Chopped; Thin  ----------------------------------------------------------------------------------------------------------------------  Physical exam:  Constitutional:   Overweight elderly female no distress  HEENT: Normocephalic atraumatic  Neck: Supple   Cardiovascular: Regular rate and rhythm  Pulmonary/Chest: Clear to auscultation  Abdominal: Positive bowel sounds soft.   Musculoskeletal: No arthropathy  Neurological: Right-sided hemiparesis 1-2 out of 5 strength  Skin: No rash  Peripheral vascular:  Genitourinary:  ----------------------------------------------------------------------------------------------------------------------    Last echocardiogram:    ----------------------------------------------------------------------------------------------------------------------  Results from last 7 days   Lab Units 09/19/21  0048   WBC 10*3/mm3 10.25   HEMOGLOBIN g/dL 13.0   HEMATOCRIT % 38.8   MCV fL 90.9   MCHC g/dL 33.5   PLATELETS 10*3/mm3 257         Results from last 7 days   Lab Units 09/19/21  0048 09/16/21  0121   SODIUM mmol/L 141 137   POTASSIUM mmol/L 3.7 3.6   MAGNESIUM mg/dL 2.0  --    CHLORIDE mmol/L 100 98   CO2 mmol/L 31.2* 31.5*   BUN mg/dL 31* 31*   CREATININE mg/dL 1.18* 1.16*   EGFR IF NONAFRICN AM mL/min/1.73 44* 45*   CALCIUM mg/dL 9.4 9.6   GLUCOSE mg/dL 158* 192*   ALBUMIN g/dL 3.41* 3.29*   BILIRUBIN mg/dL 0.3 0.3   ALK PHOS U/L 87 97   AST (SGOT) U/L 22 21   ALT (SGPT) U/L 20 22   Estimated Creatinine Clearance: 41.8 mL/min (A) (by C-G formula based on SCr of 1.18 mg/dL (H)).  No results found for: AMMONIA              Glucose   Date/Time Value Ref Range Status   09/20/2021 0553 141 (H) 70 - 130 mg/dL Final     Comment:     Meter: [id number]  : [id number] Sebastian MCMULLEN   09/19/2021 1953 256 (H) 70 - 130 mg/dL Final     Comment:     Meter: [id number] : [id number] Sebastian MCMULLEN   09/19/2021 1607 233 (H) 70 - 130 mg/dL Final     Comment:     Meter: [id number] : [id number] Noah Yao   09/19/2021 1114 167 (H) 70 - 130 mg/dL Final     Comment:     Meter: [id number] : [id number] Noah Najma   09/19/2021 0604 144 (H) 70 - 130 mg/dL Final     Comment:     Meter: [id number] : [id number] Sebastian MCMULLEN   09/18/2021 1948 275 (H) 70 - 130 mg/dL Final     Comment:     Meter: [id number] : [id number] Sebastian MCMULLEN   09/18/2021 1602 227 (H) 70 - 130 mg/dL Final     Comment:     RN Notified Meter: [id number] : [id number] LUCIDONALD MEIERER   09/18/2021 1117 234 (H) 70 - 130 mg/dL Final     Comment:     Meter: [id number] : [id number] Miko Crystal     No results found for: TSH, FREET4  No results found for: PREGTESTUR, PREGSERUM, HCG, HCGQUANT  Pain Management Panel    There is no flowsheet data to display.       Brief Urine Lab Results     None        No results found for: BLOODCX      No results found for: URINECX  No results found for: WOUNDCX  No results found for: STOOLCX        I have personally looked at the labs and they are summarized above.  ----------------------------------------------------------------------------------------------------------------------  Detailed radiology reports for the last 24 hours:    Imaging Results (Last 24 Hours)     ** No results found for the last 24 hours. **        Final impressions for the last 30 days of radiology reports:    FL Video Swallow Single Contrast    Result Date: 9/10/2021  No evidence of aspiration.  For additional information please see the report provided by the speech therapy service  This report was finalized on 9/10/2021 10:27 AM by Dr. Reg Morales MD.      FL Video Swallow Single Contrast    Result Date: 9/3/2021      Please see the speech pathologist's report for  additional information.  This report was finalized on 9/3/2021 12:31 PM by Dr. Anthony Gresham MD.      FL Video Swallow Single Contrast    Result Date: 8/28/2021  1.  Penetration with aspiration of thin liquids. 2. Please see dysphasia notes for further details.  This report was finalized on 8/28/2021 11:49 AM by Dr. Obie Fleming MD.      I have personally looked at the radiology images and read the final radiology report.    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  Moderate assistance for sit to stand and stand to sit independence.  Requiring two-person assistance with minimum assistance on parallel bars did walk 12 feet in total.  Has mild static sitting balance impairment.  Continue motor skill strengthening with therapeutic exercise.  Requiring minimum to moderate assistance for up with grooming.  Maximum assistance to dependency for toileting.  Continue statin therapy, Eliquis.  Patient is also on Coreg and amlodipine at this time    Diabetes mellitus slightly suboptimal control we will continue to monitor closely    Hypertension controlled continue Norvasc, Coreg and chlorthalidone    Depression--Zoloft    Hypothyroidism--Synthroid    VTE Prophylaxis:   Mechanical Order History:      Ordered        08/27/21 1125  Maintain Sequential Compression Device  Continuous         08/27/21 1125  Place Sequential Compression Device  Once                 Pharmalogical Order History:      Ordered     Dose Route Frequency Stop    08/27/21 1125  apixaban (ELIQUIS) tablet 5 mg      5 mg PO Every 12 Hours Scheduled --                  Garrett Sneed MD  [facility]  09/20/21  10:52 EDT

## 2021-09-21 LAB
GLUCOSE BLDC GLUCOMTR-MCNC: 166 MG/DL (ref 70–130)
GLUCOSE BLDC GLUCOMTR-MCNC: 180 MG/DL (ref 70–130)
GLUCOSE BLDC GLUCOMTR-MCNC: 190 MG/DL (ref 70–130)
GLUCOSE BLDC GLUCOMTR-MCNC: 240 MG/DL (ref 70–130)

## 2021-09-21 PROCEDURE — 97112 NEUROMUSCULAR REEDUCATION: CPT

## 2021-09-21 PROCEDURE — 97032 APPL MODALITY 1+ESTIM EA 15: CPT | Performed by: OCCUPATIONAL THERAPIST

## 2021-09-21 PROCEDURE — 97112 NEUROMUSCULAR REEDUCATION: CPT | Performed by: OCCUPATIONAL THERAPIST

## 2021-09-21 PROCEDURE — 97110 THERAPEUTIC EXERCISES: CPT

## 2021-09-21 PROCEDURE — 63710000001 INSULIN ASPART PER 5 UNITS: Performed by: INTERNAL MEDICINE

## 2021-09-21 PROCEDURE — 63710000001 ONDANSETRON PER 8 MG: Performed by: INTERNAL MEDICINE

## 2021-09-21 PROCEDURE — 97116 GAIT TRAINING THERAPY: CPT

## 2021-09-21 PROCEDURE — 82962 GLUCOSE BLOOD TEST: CPT

## 2021-09-21 PROCEDURE — 97535 SELF CARE MNGMENT TRAINING: CPT | Performed by: OCCUPATIONAL THERAPIST

## 2021-09-21 PROCEDURE — 99232 SBSQ HOSP IP/OBS MODERATE 35: CPT | Performed by: FAMILY MEDICINE

## 2021-09-21 PROCEDURE — 97530 THERAPEUTIC ACTIVITIES: CPT

## 2021-09-21 PROCEDURE — 63710000001 INSULIN DETEMIR PER 5 UNITS: Performed by: INTERNAL MEDICINE

## 2021-09-21 RX ADMIN — INSULIN DETEMIR 20 UNITS: 100 INJECTION, SOLUTION SUBCUTANEOUS at 21:12

## 2021-09-21 RX ADMIN — KETOTIFEN FUMARATE 1 DROP: 0.35 SOLUTION/ DROPS OPHTHALMIC at 09:03

## 2021-09-21 RX ADMIN — CHLORTHALIDONE 25 MG: 50 TABLET ORAL at 09:02

## 2021-09-21 RX ADMIN — ONDANSETRON HYDROCHLORIDE 4 MG: 4 TABLET, FILM COATED ORAL at 17:43

## 2021-09-21 RX ADMIN — DOCUSATE SODIUM 50 MG AND SENNOSIDES 8.6 MG 1 TABLET: 8.6; 5 TABLET, FILM COATED ORAL at 09:02

## 2021-09-21 RX ADMIN — AMLODIPINE BESYLATE 10 MG: 10 TABLET ORAL at 09:03

## 2021-09-21 RX ADMIN — CARVEDILOL 12.5 MG: 6.25 TABLET, FILM COATED ORAL at 09:02

## 2021-09-21 RX ADMIN — AMLODIPINE BESYLATE 10 MG: 10 TABLET ORAL at 21:11

## 2021-09-21 RX ADMIN — APIXABAN 5 MG: 5 TABLET, FILM COATED ORAL at 21:11

## 2021-09-21 RX ADMIN — APIXABAN 5 MG: 5 TABLET, FILM COATED ORAL at 09:02

## 2021-09-21 RX ADMIN — INSULIN DETEMIR 20 UNITS: 100 INJECTION, SOLUTION SUBCUTANEOUS at 09:09

## 2021-09-21 RX ADMIN — Medication 1 TABLET: at 09:02

## 2021-09-21 RX ADMIN — PANTOPRAZOLE SODIUM 40 MG: 40 TABLET, DELAYED RELEASE ORAL at 05:51

## 2021-09-21 RX ADMIN — KETOTIFEN FUMARATE 1 DROP: 0.35 SOLUTION/ DROPS OPHTHALMIC at 21:11

## 2021-09-21 RX ADMIN — OXYCODONE HYDROCHLORIDE AND ACETAMINOPHEN 500 MG: 500 TABLET ORAL at 09:02

## 2021-09-21 RX ADMIN — INSULIN ASPART 3 UNITS: 100 INJECTION, SOLUTION INTRAVENOUS; SUBCUTANEOUS at 17:33

## 2021-09-21 RX ADMIN — SERTRALINE 50 MG: 50 TABLET, FILM COATED ORAL at 09:03

## 2021-09-21 RX ADMIN — DOCUSATE SODIUM 50 MG AND SENNOSIDES 8.6 MG 1 TABLET: 8.6; 5 TABLET, FILM COATED ORAL at 21:11

## 2021-09-21 RX ADMIN — Medication 1000 MCG: at 09:02

## 2021-09-21 RX ADMIN — LINAGLIPTIN 5 MG: 5 TABLET, FILM COATED ORAL at 09:02

## 2021-09-21 RX ADMIN — ATORVASTATIN CALCIUM 80 MG: 40 TABLET, FILM COATED ORAL at 21:10

## 2021-09-21 RX ADMIN — INSULIN ASPART 2 UNITS: 100 INJECTION, SOLUTION INTRAVENOUS; SUBCUTANEOUS at 12:04

## 2021-09-21 RX ADMIN — KETOTIFEN FUMARATE 1 DROP: 0.35 SOLUTION/ DROPS OPHTHALMIC at 17:34

## 2021-09-21 RX ADMIN — Medication 10 MG: at 21:10

## 2021-09-21 RX ADMIN — INSULIN ASPART 2 UNITS: 100 INJECTION, SOLUTION INTRAVENOUS; SUBCUTANEOUS at 09:03

## 2021-09-21 RX ADMIN — LEVOTHYROXINE SODIUM 50 MCG: 50 TABLET ORAL at 05:51

## 2021-09-21 NOTE — SIGNIFICANT NOTE
09/21/21 7420   Plan   Plan Spoke to pt about recommendations for home health PT, OT, nursing, hospital bed, wheelchair, and bedside commode.  Pt prefers [facility] and has no preference for DME provider.  Pt wants to go home with family providing assistance.  Encouraged pt to continue working with therapy at home.  Offered option of going to SNF or swing bed if available at [facility].  Pt says she wants to go home first.  Reviewed going to SNF from home if needed.  Informed pt about [facility] accessible van transportation via [employer] or [employer] at discharge who is agreeable.  Will follow.   Patient/Family in Agreement with Plan yes

## 2021-09-21 NOTE — PROGRESS NOTES
[facility]  PROGRESS NOTE     Patient Identification:  Name:  Joycelyn Brown  Age:  81 y.o.  Sex:  female  :  1940  MRN:  [id number]  Visit Number:  [id number]  ROOM: 110/1S     Primary Care Provider:  Donya Looney APRN    Length of stay in inpatient status:  25    Subjective     Chief Compliant:  No chief complaint on file.      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  Patient continues to have difficulties with her depression.  Patient denies any new difficulties otherwise.  Patient making very slow progress with her rehab per PT and OT this morning.    Objective     Current Hospital Meds:amLODIPine, 10 mg, Oral, BID  apixaban, 5 mg, Oral, Q12H  ascorbic acid, 500 mg, Oral, Daily  atorvastatin, 80 mg, Oral, Nightly  carvedilol, 12.5 mg, Oral, BID With Meals  chlorthalidone, 25 mg, Oral, Daily  insulin aspart, 0-7 Units, Subcutaneous, TID AC  insulin detemir, 20 Units, Subcutaneous, Q12H  ketotifen, 1 drop, Both Eyes, TID  levothyroxine, 50 mcg, Oral, Q AM  linagliptin, 5 mg, Oral, Daily  melatonin, 10 mg, Oral, Nightly  multivitamin with minerals, 1 tablet, Oral, Daily  pantoprazole, 40 mg, Oral, Q AM  senna-docusate sodium, 1 tablet, Oral, BID  [START ON 2021] sertraline, 100 mg, Oral, Daily  vitamin B-12, 1,000 mcg, Oral, Daily       ----------------------------------------------------------------------------------------------------------------------  Vital Signs:  Temp:  [98.1 °F (36.7 °C)] 98.1 °F (36.7 °C)  Heart Rate:  [60-69] 69  Resp:  [16] 16  BP: (148-159)/(55-66) 154/66  SpO2:  [98 %] 98 %  on  Flow (L/min):  [3] 3;   Device (Oxygen Therapy): nasal cannula  Body mass index is 28.19 kg/m².    Wt Readings from Last 3 Encounters:   21 81.6 kg (180 lb)   02/10/15 81.7 kg (180 lb 0.1 oz)     Intake & Output (last 3 days)       701 -  07 -  07 -  09/21 0700 09/21 0701 - 09/22 0700    P.O. 680 1140 1160 580    Total Intake(mL/kg) 680 (8.3) 1140 (14) 1160 (14.2) 580 (7.1)    Net +680 +1140 +1160 +580            Urine Unmeasured Occurrence 5 x 9 x 7 x 4 x    Stool Unmeasured Occurrence 3 x  2 x 1 x        Diet Soft Texture; Chopped; Thin  ----------------------------------------------------------------------------------------------------------------------  Physical exam:  Constitutional:   Slightly overweight 81-year-old female no distress  HEENT: Normocephalic atraumatic  Neck: Supple   Cardiovascular: Regular rate and rhythm  Pulmonary/Chest: Clear to auscultation  Abdominal: Positive bowel sounds soft.   Musculoskeletal: No arthropathy  Neurological: Right-sided hemiparesis  Skin: No rash  Peripheral vascular:  Genitourinary:  ----------------------------------------------------------------------------------------------------------------------    Last echocardiogram:    ----------------------------------------------------------------------------------------------------------------------  Results from last 7 days   Lab Units 09/19/21  0048   WBC 10*3/mm3 10.25   HEMOGLOBIN g/dL 13.0   HEMATOCRIT % 38.8   MCV fL 90.9   MCHC g/dL 33.5   PLATELETS 10*3/mm3 257         Results from last 7 days   Lab Units 09/19/21  0048 09/16/21  0121   SODIUM mmol/L 141 137   POTASSIUM mmol/L 3.7 3.6   MAGNESIUM mg/dL 2.0  --    CHLORIDE mmol/L 100 98   CO2 mmol/L 31.2* 31.5*   BUN mg/dL 31* 31*   CREATININE mg/dL 1.18* 1.16*   EGFR IF NONAFRICN AM mL/min/1.73 44* 45*   CALCIUM mg/dL 9.4 9.6   GLUCOSE mg/dL 158* 192*   ALBUMIN g/dL 3.41* 3.29*   BILIRUBIN mg/dL 0.3 0.3   ALK PHOS U/L 87 97   AST (SGOT) U/L 22 21   ALT (SGPT) U/L 20 22   Estimated Creatinine Clearance: 41.8 mL/min (A) (by C-G formula based on SCr of 1.18 mg/dL (H)).  No results found for: AMMONIA              Glucose   Date/Time Value Ref Range Status   09/21/2021 1107 190 (H) 70 - 130 mg/dL Final     Comment:      Meter: [id number] : [id number] Noah Najma   09/21/2021 0538 166 (H) 70 - 130 mg/dL Final     Comment:     Meter: [id number] : [id number] Sebastian Cervantes    09/20/2021 2023 219 (H) 70 - 130 mg/dL Final     Comment:     Meter: [id number] : [id number] Sebastian Cervantes    09/20/2021 1614 204 (H) 70 - 130 mg/dL Final     Comment:     Meter: [id number] : [id number] Noah Najma   09/20/2021 1124 308 (H) 70 - 130 mg/dL Final     Comment:     Meter: [id number] : [id number] Casie Donya   09/20/2021 0553 141 (H) 70 - 130 mg/dL Final     Comment:     Meter: [id number] : [id number] Sebastian MCMULLEN   09/19/2021 1953 256 (H) 70 - 130 mg/dL Final     Comment:     Meter: [id number] : [id number] Sebastian [facility]   09/19/2021 1607 233 (H) 70 - 130 mg/dL Final     Comment:     Meter: [id number] : [id number] Noah Yao     No results found for: TSH, FREET4  No results found for: PREGTESTUR, PREGSERUM, HCG, HCGQUANT  Pain Management Panel    There is no flowsheet data to display.       Brief Urine Lab Results     None        No results found for: BLOODCX      No results found for: URINECX  No results found for: WOUNDCX  No results found for: STOOLCX        I have personally looked at the labs and they are summarized above.  ----------------------------------------------------------------------------------------------------------------------  Detailed radiology reports for the last 24 hours:    Imaging Results (Last 24 Hours)     ** No results found for the last 24 hours. **        Final impressions for the last 30 days of radiology reports:    FL Video Swallow Single Contrast    Result Date: 9/10/2021  No evidence of aspiration.  For additional information please see the report provided by the speech therapy service  This report was finalized on 9/10/2021 10:27 AM by Dr. Reg Morales MD.      FL Video Swallow Single Contrast    Result Date: 9/3/2021      Please see the speech pathologist's report  for additional information.  This report was finalized on 9/3/2021 12:31 PM by Dr. Anthony Gresham MD.      FL Video Swallow Single Contrast    Result Date: 8/28/2021  1.  Penetration with aspiration of thin liquids. 2. Please see dysphasia notes for further details.  This report was finalized on 8/28/2021 11:49 AM by Dr. Obie Fleming MD.      I have personally looked at the radiology images and read the final radiology report.    Assessment & Plan    Status post CVA in the left MCA pontine distribution with right-sided hemiparesis--patient requiring maximum assistance with ADLs.  Patient ambulated 6 feet with a platform walker yesterday.  We will continue to work with patient diligently.  Patient currently on statin therapy, Eliquis.    Major depression disorder--increase Zoloft 200 mg daily    Hypertension reasonably well controlled.  Continue chlorthalidone, Norvasc and Coreg.    Hypothyroidism continue Synthroid    VTE Prophylaxis:   Mechanical Order History:      Ordered        08/27/21 1125  Maintain Sequential Compression Device  Continuous         08/27/21 1125  Place Sequential Compression Device  Once                 Pharmalogical Order History:      Ordered     Dose Route Frequency Stop    08/27/21 1125  apixaban (ELIQUIS) tablet 5 mg      5 mg PO Every 12 Hours Scheduled --                    Garrett Sneed MD  [facility]  09/21/21  14:10 EDT

## 2021-09-21 NOTE — THERAPY TREATMENT NOTE
Inpatient Rehabilitation - Physical Therapy Treatment Note        Tre     Patient Name: Joycelyn Brown  : 1940  MRN: [id number]    Today's Date: 2021                    Admit Date: 2021      Visit Dx:     ICD-10-CM ICD-9-CM   1. CVA (cerebral vascular accident) (CMS/HCC)  I63.9 434.91       Patient Active Problem List   Diagnosis   • CVA (cerebral vascular accident) (CMS/[facility])       History reviewed. No pertinent past medical history.    No past surgical history on file.       PT ASSESSMENT (last 12 hours)      IRF PT Evaluation and Treatment     Row Name 21 1542          PT Time and Intention    Document Type  daily treatment  -RF     Mode of Treatment  physical therapy;individual therapy  -RF     Patient/Family/Caregiver Comments/Observations  Pt appears poorly motivated and reports being depressed this date. Pt requires cuing for encouragement and proper technique with activity.   -RF     Row Name 21 1542          General Information    Patient Profile Reviewed  yes  -RF     Existing Precautions/Restrictions  fall;oxygen therapy device and L/min R HP  -RF     Row Name 21 1542          Cognition/Psychosocial    Affect/Mental Status (Cognitive)  WFL  -RF     Follows Commands (Cognition)  WFL  -RF     Row Name 21 1542          Pain Scale: FACES Pre/Post-Treatment    Pain: FACES Scale, Pretreatment  0-->no hurt  -RF     Posttreatment Pain Rating  0-->no hurt  -RF     Row Name 21 1542          Bed Mobility    Rolling Left [place] (Bed Mobility)  moderate assist (50% patient effort) for dressing/changing  -RF     Rolling Right [place] (Bed Mobility)  minimum assist (75% patient effort);moderate assist (50% patient effort)  -RF     Supine-Sit [place] (Bed Mobility)  maximum assist (25% patient effort);2 person assist  -RF     Sit-Supine [place] (Bed Mobility)  maximum assist (25% patient effort);2 person assist  -RF     Assistive Device (Bed  Mobility)  bed rails  -RF     Row Name 09/21/21 1542          Transfers    Bed-Chair [place] (Transfers)  maximum assist (25% patient effort);dependent (less than 25% patient effort);2 person assist  -RF     Chair-Bed [place] (Transfers)  maximum assist (25% patient effort);dependent (less than 25% patient effort);2 person assist  -RF     Assistive Device (Bed-Chair Transfers)  wheelchair  -RF     Sit-Stand [place] (Transfers)  moderate assist (50% patient effort);2 person assist  -RF     Stand-Sit [place] (Transfers)  moderate assist (50% patient effort);2 person assist  -RF     Row Name 09/21/21 1542          Chair-Bed Transfer    Assistive Device (Chair-Bed Transfers)  wheelchair  -RF     Row Name 09/21/21 1542          Sit-Stand Transfer    Assistive Device (Sit-Stand Transfers)  other (see comments) parallel bars  -RF     Row Name 09/21/21 1542          Stand-Sit Transfer    Assistive Device (Stand-Sit Transfers)  other (see comments) parallel bars  -RF     Row Name 09/21/21 1542          Gait/Stairs (Locomotion)    Gait/Stairs Locomotion  gait/ambulation assistive device  -RF     [place] Level (Gait)  2 person assist;maximum assist (25% patient effort)  -RF     Assistive Device (Gait)  walker, platform  -RF     Distance in Feet (Gait)  3  -RF     Pattern (Gait)  step-to;step-through  -RF     Bilateral Gait Deviations  knee buckling, right side  -RF     Right Sided Gait Deviations  knee buckling, right side;weight shift ability decreased  -RF     Comment (Gait/Stairs)  Cues provided for proper technique; no evidence of learning observed.   -RF     Row Name 09/21/21 1542          Safety Issues, Functional Mobility    Impairments Affecting Function (Mobility)  balance;endurance/activity tolerance;muscle tone abnormal;postural/trunk control;strength  -RF     Row Name 09/21/21 1542          Balance    Dynamic Sitting Balance  mild impairment;unsupported;sitting in chair;asymmetrical weight  shifting;other (see comments) bag toss with reach outside BRENDA to L  -RF     Comment, Balance  LOB noted when sitting EOB  -RF     Row Name 09/21/21 1542          Hip (Therapeutic Exercise)    Hip Strengthening (Therapeutic Exercise)  bilateral;flexion;extension;aBduction;aDduction;marching while seated;resistance band;red;2 sets;10 repetitions;other (see comments) AAROM required on RLE  -RF     Row Name 09/21/21 1542          Knee (Therapeutic Exercise)    Knee Strengthening (Therapeutic Exercise)  bilateral;flexion;extension;marching while seated;LAQ (long arc quad);hamstring curls;sitting;resistance band;red;2 sets;10 repetitions AAROM required on RLE  -RF     Row Name 09/21/21 1542          Ankle (Therapeutic Exercise)    Ankle Strengthening (Therapeutic Exercise)  bilateral;dorsiflexion;plantarflexion;sitting;2 sets;10 repetitions;other (see comments) AAROM required on RLE  -RF     Row Name 09/21/21 1542          Bed Mobility Goal 1 (PT-IRF)    Activity/Assistive Device (Bed Mobility Goal 1, PT-IRF)  bed mobility activities, all  -RF     [place] Level (Bed Mobility Goal 1, PT-IRF)  standby assist  -RF     Time Frame (Bed Mobility Goal 1, PT-IRF)  long-term goal (LTG)  -RF     Progress/Outcomes (Bed Mobility Goal 1, PT-IRF)  goal ongoing  -RF     Row Name 09/21/21 1542          Transfer Goal 1 (PT-IRF)    Activity/Assistive Device (Transfer Goal 1, PT-IRF)  all transfers  -RF     [place] Level (Transfer Goal 1, PT-IRF)  minimum assist (75% or more patient effort)  -RF     Time Frame (Transfer Goal 1, PT-IRF)  by discharge  -RF     Progress/Outcomes (Transfer Goal 1, PT-IRF)  goal ongoing  -RF     Row Name 09/21/21 1542          Gait/Walking Locomotion Goal 1 (PT-IRF)    Activity/Assistive Device (Gait/Walking Locomotion Goal 1, PT-IRF)  gait (walking locomotion);assistive device use  -RF     Gait/Walking Locomotion Distance Goal 1 (PT-IRF)  5  -RF     [place] Level (Gait/Walking Locomotion Goal 1,  PT-IRF)  minimum assist (75% or more patient effort)  -RF     Time Frame (Gait/Walking Locomotion Goal 1, PT-IRF)  long-term goal (LTG)  -RF     Progress/Outcomes (Gait/Walking Locomotion Goal 1, PT-IRF)  goal ongoing  -RF     Row Name 09/21/21 1542          Stairs Goal 1 (PT-IRF)    Activity/Assistive Device (Stairs Goal 1, PT-IRF)  stairs, all skills;ascending stairs;descending stairs;using handrail, left  -RF     Number of Stairs (Stairs Goal 1, PT-IRF)  5  -RF     [place] Level (Stairs Goal 1, PT-IRF)  minimum assist (75% or more patient effort);moderate assist (50-74% patient effort)  -RF     Time Frame (Stairs Goal 1, PT-IRF)  long-term goal (LTG)  -RF     Progress/Outcomes (Stairs Goal 1, PT-IRF)  goal ongoing  -RF     Row Name 09/21/21 1542          Positioning and Restraints    Pre-Treatment Position  sitting in chair/recliner AM, bed in PM  -RF     In Bed  supine;call light within reach;encouraged to call for assist BID  -RF     Row Name 09/21/21 1542          Therapy Assessment/Plan (PT)    Rehab Potential/Prognosis (PT)  adequate, monitor progress closely  -RF     Frequency of Treatment (PT)  5 times per week  -RF     Estimated Duration of Therapy (PT)  2 weeks  -RF     Problem List (PT)  problems related to;balance;hemiparesis/hemiplegia;mobility;strength  -RF     Row Name 09/21/21 1542          Daily Progress Summary (PT)    Functional Goal Overall Progress (PT)  not progressing toward functional goals as expected  -RF     Daily Progress Summary (PT)  Pt continues to require increased assistance with all functional mobility and activity. Pt requires frequent cuing for technique with TE. Education provided on importance of complaince with HEP for continued strengthening.  -RF     Impairments Still Limiting Function (PT)  sensation decreased;strength decreased;impaired balance;coordination impaired;impaired functional activity tolerance;motor control impaired;postural control impaired;pain  -RF      Recommendations (PT)  Continue per current POC.   -RF       User Key  (r) = Recorded By, (t) = Taken By, (c) = Cosigned By    Initials Name Provider Type    Malinda Wang PTA Physical Therapy Assistant           Physical Therapy Education                 Title: PT OT SLP Therapies (Done)     Topic: Physical Therapy (Done)     Point: Mobility training (Done)     Learning Progress Summary           Patient Acceptance, E,TB, VU by RF at 9/21/2021 1547    Acceptance, E,TB, VU by RF at 9/20/2021 1558    Acceptance, E,TB, VU by DG at 9/18/2021 2225    Acceptance, E,TB, VU by DG at 9/18/2021 0014    Acceptance, E,TB, VU by DG at 9/16/2021 2222    Acceptance, E,TB, VU by DG at 9/15/2021 2303    Acceptance, E,TB, VU by RF at 9/15/2021 1603    Acceptance, E,TB, VU by RF at 9/14/2021 1614    Acceptance, E, VU,NR by LB at 9/13/2021 1121    Acceptance, E,TB, VU by RF at 9/10/2021 1556    Acceptance, E,TB, VU by RF at 9/9/2021 1551    Acceptance, E,TB, VU by RF at 9/8/2021 1602    Acceptance, E,TB, VU by RF at 9/7/2021 1605    Acceptance, E,TB, VU by RF at 9/6/2021 1557    Acceptance, E,TB, VU by RF at 9/3/2021 1532    Acceptance, E,TB, VU by DG at 9/2/2021 2003    Acceptance, E,TB, VU by RF at 9/2/2021 1610    Acceptance, E,TB, VU by DG at 9/1/2021 2256    Acceptance, E,TB, VU by RF at 9/1/2021 1548    Acceptance, E,TB, VU by RF at 8/31/2021 1423    Acceptance, E,TB, VU by RF at 8/30/2021 1548                   Point: Home exercise program (Done)     Learning Progress Summary           Patient Acceptance, E,TB, VU by RF at 9/21/2021 1547    Acceptance, E,TB, VU by RF at 9/20/2021 1558    Acceptance, E,TB, VU by DG at 9/18/2021 2225    Acceptance, E,TB, VU by DG at 9/18/2021 0014    Acceptance, E,TB, VU by DG at 9/16/2021 2222    Acceptance, E,TB, VU by DG at 9/15/2021 2303    Acceptance, E,TB, VU by RF at 9/15/2021 1603    Acceptance, E,TB, VU by RF at 9/14/2021 1614    Acceptance, E, VU,NR by LB at 9/13/2021 1121     Acceptance, E,TB, VU by RF at 9/10/2021 1556    Acceptance, E,TB, VU by RF at 9/9/2021 1551    Acceptance, E,TB, VU by RF at 9/8/2021 1602    Acceptance, E,TB, VU by RF at 9/7/2021 1605    Acceptance, E,TB, VU by RF at 9/6/2021 1557    Acceptance, E,TB, VU by RF at 9/3/2021 1532    Acceptance, E,TB, VU by DG at 9/2/2021 2003    Acceptance, E,TB, VU by RF at 9/2/2021 1610    Acceptance, E,TB, VU by DG at 9/1/2021 2256    Acceptance, E,TB, VU by RF at 9/1/2021 1548    Acceptance, E,TB, VU by RF at 8/31/2021 1423    Acceptance, E,TB, VU by RF at 8/30/2021 1548                   Point: Body mechanics (Done)     Learning Progress Summary           Patient Acceptance, E,TB, VU by RF at 9/21/2021 1547    Acceptance, E,TB, VU by RF at 9/20/2021 1558    Acceptance, E,TB, VU by DG at 9/18/2021 2225    Acceptance, E,TB, VU by DG at 9/18/2021 0014    Acceptance, E,TB, VU by DG at 9/16/2021 2222    Acceptance, E,TB, VU by DG at 9/15/2021 2303    Acceptance, E,TB, VU by RF at 9/15/2021 1603    Acceptance, E,TB, VU by RF at 9/14/2021 1614    Acceptance, E, VU,NR by LB at 9/13/2021 1121    Acceptance, E,TB, VU by RF at 9/10/2021 1556    Acceptance, E,TB, VU by RF at 9/9/2021 1551    Acceptance, E,TB, VU by RF at 9/8/2021 1602    Acceptance, E,TB, VU by RF at 9/7/2021 1605    Acceptance, E,TB, VU by RF at 9/6/2021 1557    Acceptance, E,TB, VU by RF at 9/3/2021 1532    Acceptance, E,TB, VU by DG at 9/2/2021 2003    Acceptance, E,TB, VU by RF at 9/2/2021 1610    Acceptance, E,TB, VU by DG at 9/1/2021 2256    Acceptance, E,TB, VU by RF at 9/1/2021 1548    Acceptance, E,TB, VU by RF at 8/31/2021 1423    Acceptance, E,TB, VU by RF at 8/30/2021 1548                   Point: Precautions (Done)     Learning Progress Summary           Patient Acceptance, E,TB, VU by RF at 9/21/2021 1547    Acceptance, E,TB, VU by RF at 9/20/2021 1558    Acceptance, E,TB, VU by DG at 9/18/2021 2225    Acceptance, E,TB, VU by DG at 9/18/2021 0014     Acceptance, E,TB, VU by DG at 9/16/2021 2222    Acceptance, E,TB, VU by DG at 9/15/2021 2303    Acceptance, E,TB, VU by RF at 9/15/2021 1603    Acceptance, E,TB, VU by RF at 9/14/2021 1614    Acceptance, E, VU,NR by LB at 9/13/2021 1121    Acceptance, E,TB, VU by RF at 9/10/2021 1556    Acceptance, E,TB, VU by RF at 9/9/2021 1551    Acceptance, E,TB, VU by RF at 9/8/2021 1602    Acceptance, E,TB, VU by RF at 9/7/2021 1605    Acceptance, E,TB, VU by RF at 9/6/2021 1557    Acceptance, E,TB, VU by RF at 9/3/2021 1532    Acceptance, E,TB, VU by DG at 9/2/2021 2003    Acceptance, E,TB, VU by RF at 9/2/2021 1610    Acceptance, E,TB, VU by DG at 9/1/2021 2256    Acceptance, E,TB, VU by RF at 9/1/2021 1548    Acceptance, E,TB, VU by RF at 8/31/2021 1423    Acceptance, E,TB, VU by RF at 8/30/2021 1548                               User Key     Initials Effective Dates Name Provider Type Discipline     06/16/21 -  Melissa Swift, RN Registered Nurse Nurse     06/16/21 -  Anju Reis, PT Physical Therapist PT     06/16/21 -  Malinda Alicea PTA Physical Therapy Assistant PT                PT Recommendation and Plan    Frequency of Treatment (PT): 5 times per week     Daily Progress Summary (PT)  Functional Goal Overall Progress (PT): not progressing toward functional goals as expected  Daily Progress Summary (PT): Pt continues to require increased assistance with all functional mobility and activity. Pt requires frequent cuing for technique with TE. Education provided on importance of complaince with HEP for continued strengthening.  Impairments Still Limiting Function (PT): sensation decreased, strength decreased, impaired balance, coordination impaired, impaired functional activity tolerance, motor control impaired, postural control impaired, pain  Recommendations (PT): Continue per current POC.                Time Calculation:     PT Charges     Row Name 09/21/21 1548 09/21/21 1547          Time  Calculation    Start Time  1245  -RF  0915  -RF     Stop Time  1330  -RF  1000  -RF     Time Calculation (min)  45 min  -RF  45 min  -RF     PT Received On  09/21/21  -RF  09/21/21  -RF     PT - Next Appointment  09/22/21  -RF  09/21/21  -RF     PT Goal Re-Cert Due Date  10/01/21  -RF  10/01/21  -RF        Time Calculation- PT    Total Timed Code Minutes- PT  45 minute(s)  -RF  45 minute(s)  -RF       User Key  (r) = Recorded By, (t) = Taken By, (c) = Cosigned By    Initials Name Provider Type    RF Malinda Alicea, PTA Physical Therapy Assistant          Therapy Charges for Today     Code Description Service Date Service Provider Modifiers Qty    83829516326 HC GAIT TRAINING EA 15 MIN 9/20/2021 Malinda Alicea, PTA GP, KX 2    95705140620 HC PT THER PROC EA 15 MIN 9/20/2021 Malinda Alicea, PTA GP, KX 3    51147952047 HC PT NEUROMUSC RE EDUCATION EA 15 MIN 9/20/2021 Malinda Alicea, PTA GP, KX 1    69163999758 HC GAIT TRAINING EA 15 MIN 9/21/2021 Malinda Alicea, PTA GP, KX 1    45688936134 HC PT NEUROMUSC RE EDUCATION EA 15 MIN 9/21/2021 Malinda Alicea, PTA GP, KX 2    41642349787 HC PT THER PROC EA 15 MIN 9/21/2021 Malinda Alicea, PTA GP, KX 2    67796932790 HC PT THERAPEUTIC ACT EA 15 MIN 9/21/2021 Malinda Alicea, PTA GP, KX 1                   Malindacasey Alicea, PTA  9/21/2021

## 2021-09-21 NOTE — SIGNIFICANT NOTE
09/21/21 9807   Plan   Plan Received call from daughter Lissette who says family will take pt home on 9-24-21 and assist with caregiving needs.  Family request home health therapy instead of outpatient therapy at discharge.  Pt will need W/C accessible van for transport home.  Daughter says [facility] preferred and no preference for DME provider.  W/C will need to be delivered to pt's room for transport home.  Daughter says she can be contacted for DME delivery to pt's home.  SS reviewed this information with pt who is agreeable.   Patient/Family in Agreement with Plan yes

## 2021-09-21 NOTE — SIGNIFICANT NOTE
09/21/21 1045   Plan   Plan Team conference held today.  Pt is recommended to go home on 9-24-21 if family able to meet caregiving needs.  Spoke to daughter Lissette [phone] about recommendation for discharge on 9-24-21 and pt needing max A with ADL's, getting more movement in right arm, walking 6 ft with platform RW out of parallel bars and needing mod-max A x 2 with transfers.  Daughter will talk to siblings and call SS back.

## 2021-09-21 NOTE — THERAPY TREATMENT NOTE
Inpatient Rehabilitation - Occupational Therapy Treatment Note     [place]     Patient Name: Joycelyn Brown  : 1940  MRN: [id number]    Today's Date: 2021                 Admit Date: 2021         ICD-10-CM ICD-9-CM   1. CVA (cerebral vascular accident) (CMS/[facility])  I63.9 434.91       Patient Active Problem List   Diagnosis   • CVA (cerebral vascular accident) (CMS/[facility])       History reviewed. No pertinent past medical history.    No past surgical history on file.             IRF OT ASSESSMENT FLOWSHEET (last 12 hours)      IRF OT Evaluation and Treatment     Row Name 21 1500          OT Time and Intention    Document Type  daily treatment  -     Mode of Treatment  individual therapy;occupational therapy  -     Patient Effort  adequate  -     Row Name 21 1500          General Information    Patient/Family/Caregiver Comments/Observations  patient agreeable to therapy  -     Existing Precautions/Restrictions  fall;oxygen therapy device and L/min  -     Row Name 21 1500          Transfers    Bed-Chair [place] (Transfers)  maximum assist (25% patient effort);2 person assist  -[facility] Name 21 1500          Motor Skills    Motor Skills  coordination;functional endurance;therapeutic exercise  -     Therapeutic Exercise  -- RUE ROM, wt bearing, grasp and release  -     Row Name 21 1500          Lower Body Dressing    [place] Level (Lower Body Dressing)  maximum assist (25% patient effort)  -[facility] Name 21 1500          Grooming    [place] Level (Grooming)  moderate assist (50% patient effort);minimum assist (75% patient effort);hair care, combing/brushing  -     Row Name 21 1500          Modality Intervention (Comprehensive)    Modality Treatment  NMES/FES/[place]  -     Additional Documentation  -- right wrist and digits  -       User Key  (r) = Recorded By, (t) = Taken By, (c) = Cosigned By    Initials Name Effective Dates     Maria Guadalupe Tyler, OT 06/16/21 -            Occupational Therapy Education                 Title: PT OT SLP Therapies (Done)     Topic: Occupational Therapy (Done)     Point: ADL training (Done)     Description:   Instruct learner(s) on proper safety adaptation and remediation techniques during self care or transfers.   Instruct in proper use of assistive devices.              Learning Progress Summary           Patient Acceptance, E,TB, VU by DG at 9/18/2021 2225    Acceptance, E,TB, VU by DG at 9/18/2021 0014    Acceptance, E, VU,NR by  at 9/17/2021 1456    Acceptance, E,TB, VU by DG at 9/16/2021 2222    Acceptance, E,TB, VU by DG at 9/15/2021 2303    Acceptance, E,TB, VU by DG at 9/2/2021 2003    Acceptance, E,D, VU,NR by  at 9/2/2021 1458    Acceptance, E,TB, VU by DG at 9/1/2021 2256    Acceptance, E,D, VU,NR by  at 9/1/2021 1520    Acceptance, E,D, VU,NR by  at 8/31/2021 1541    Acceptance, E, VU,NR by BF at 8/30/2021 1507    Acceptance, E,TB, VU by DG at 8/28/2021 2040    Acceptance, E,D, VU,NR by AB at 8/28/2021 1410                   Point: Home exercise program (Done)     Description:   Instruct learner(s) on appropriate technique for monitoring, assisting and/or progressing therapeutic exercises/activities.              Learning Progress Summary           Patient Acceptance, E,TB, VU by DG at 9/18/2021 2225    Acceptance, E,TB, VU by DG at 9/18/2021 0014    Acceptance, E,TB, VU by DG at 9/16/2021 2222    Acceptance, E,TB, VU by DG at 9/15/2021 2303    Acceptance, E,TB, VU by DG at 9/2/2021 2003    Acceptance, E,TB, VU by DG at 9/1/2021 2256    Acceptance, E,TB, VU by DG at 8/28/2021 2040                   Point: Precautions (Done)     Description:   Instruct learner(s) on prescribed precautions during self-care and functional transfers.              Learning Progress Summary           Patient Acceptance, E,TB, VU by DG at 9/18/2021 2225    Acceptance, E,TB, VU by DG at 9/18/2021 0014     Acceptance, E, VU,NR by  at 9/17/2021 1456    Acceptance, E,TB, VU by  at 9/16/2021 2222    Acceptance, E,TB, VU by  at 9/15/2021 2303    Acceptance, E,TB, VU by DG at 9/2/2021 2003    Acceptance, E,D, VU,NR by  at 9/2/2021 1458    Acceptance, E,TB, VU by DG at 9/1/2021 2256    Acceptance, E,D, VU,NR by  at 9/1/2021 1520    Acceptance, E,D, VU,NR by  at 8/31/2021 1541    Acceptance, E, VU,NR by  at 8/30/2021 1507    Acceptance, E,TB, VU by  at 8/28/2021 2040    Acceptance, E,D, VU,NR by AB at 8/28/2021 1410                               User Key     Initials Effective Dates Name Provider Type [facility] 06/16/21 -  Yazmin Chaves, OT Occupational Therapist OT     06/16/21 -  Melissa Swift, RN Registered Nurse Nurse     06/16/21 -  Debbie Mehta, OT Occupational Therapist OT     06/16/21 -  Lucrecia Andrews OT Occupational Therapist OT                    OT Recommendation and Plan                         Time Calculation:     Time Calculation- OT     Row Name 09/21/21 1503 09/21/21 1502          Time Calculation- OT    OT Start Time  1335  -  0730  -     OT Stop Time  1405  -AH  0830  -     OT Time Calculation (min)  30 min  -  60 min  -       User Key  (r) = Recorded By, (t) = Taken By, (c) = Cosigned By    Initials Name Provider Type     Maria Guadalupe Monge, OT Occupational Therapist        Therapy Charges for Today     Code Description Service Date Service Provider Modifiers Qty    28395569723 HC OT SELF CARE/MGMT/TRAIN EA 15 MIN 9/20/2021 Maria Guadalupe Monge OT GO, KX 2    73333755030 HC OT NEUROMUSC RE EDUCATION EA 15 MIN 9/20/2021 Maria Guadalupe Monge OT GO KX 3    09756820341 HC OT ELEC STIM EA-PER 15 MIN 9/20/2021 Maria Guadalupe Monge OT GO, KX 1    61949584429 HC OT SELF CARE/MGMT/TRAIN EA 15 MIN 9/21/2021 Maria Guadalupe Monge OT GO, KX 2    85710729959 HC OT NEUROMUSC RE EDUCATION EA 15 MIN 9/21/2021 Maria Guadalupe Monge, OT RAMON, KX 3     [id number]  OT ELEC STIM EA-PER 15 MIN 9/21/2021 Maria Guadalupe Monge, OT GO, KX 1                   Maria Guadalupe Monge OT  9/21/2021

## 2021-09-22 LAB
GLUCOSE BLDC GLUCOMTR-MCNC: 157 MG/DL (ref 70–130)
GLUCOSE BLDC GLUCOMTR-MCNC: 231 MG/DL (ref 70–130)
GLUCOSE BLDC GLUCOMTR-MCNC: 240 MG/DL (ref 70–130)
GLUCOSE BLDC GLUCOMTR-MCNC: 283 MG/DL (ref 70–130)

## 2021-09-22 PROCEDURE — 97116 GAIT TRAINING THERAPY: CPT

## 2021-09-22 PROCEDURE — 99231 SBSQ HOSP IP/OBS SF/LOW 25: CPT | Performed by: FAMILY MEDICINE

## 2021-09-22 PROCEDURE — 97535 SELF CARE MNGMENT TRAINING: CPT | Performed by: OCCUPATIONAL THERAPIST

## 2021-09-22 PROCEDURE — 97112 NEUROMUSCULAR REEDUCATION: CPT | Performed by: OCCUPATIONAL THERAPIST

## 2021-09-22 PROCEDURE — 82962 GLUCOSE BLOOD TEST: CPT

## 2021-09-22 PROCEDURE — 94799 UNLISTED PULMONARY SVC/PX: CPT

## 2021-09-22 PROCEDURE — 63710000001 ONDANSETRON PER 8 MG: Performed by: INTERNAL MEDICINE

## 2021-09-22 PROCEDURE — 97032 APPL MODALITY 1+ESTIM EA 15: CPT | Performed by: OCCUPATIONAL THERAPIST

## 2021-09-22 PROCEDURE — 63710000001 INSULIN ASPART PER 5 UNITS: Performed by: INTERNAL MEDICINE

## 2021-09-22 PROCEDURE — 97530 THERAPEUTIC ACTIVITIES: CPT

## 2021-09-22 PROCEDURE — 63710000001 INSULIN DETEMIR PER 5 UNITS: Performed by: INTERNAL MEDICINE

## 2021-09-22 PROCEDURE — 97110 THERAPEUTIC EXERCISES: CPT

## 2021-09-22 PROCEDURE — 97112 NEUROMUSCULAR REEDUCATION: CPT

## 2021-09-22 RX ADMIN — DOCUSATE SODIUM 50 MG AND SENNOSIDES 8.6 MG 1 TABLET: 8.6; 5 TABLET, FILM COATED ORAL at 09:01

## 2021-09-22 RX ADMIN — Medication 1 TABLET: at 09:01

## 2021-09-22 RX ADMIN — INSULIN DETEMIR 20 UNITS: 100 INJECTION, SOLUTION SUBCUTANEOUS at 09:01

## 2021-09-22 RX ADMIN — INSULIN ASPART 2 UNITS: 100 INJECTION, SOLUTION INTRAVENOUS; SUBCUTANEOUS at 08:59

## 2021-09-22 RX ADMIN — INSULIN ASPART 3 UNITS: 100 INJECTION, SOLUTION INTRAVENOUS; SUBCUTANEOUS at 17:41

## 2021-09-22 RX ADMIN — APIXABAN 5 MG: 5 TABLET, FILM COATED ORAL at 09:01

## 2021-09-22 RX ADMIN — Medication 1000 MCG: at 09:00

## 2021-09-22 RX ADMIN — CARVEDILOL 12.5 MG: 6.25 TABLET, FILM COATED ORAL at 17:40

## 2021-09-22 RX ADMIN — LEVOTHYROXINE SODIUM 50 MCG: 50 TABLET ORAL at 05:46

## 2021-09-22 RX ADMIN — KETOTIFEN FUMARATE 1 DROP: 0.35 SOLUTION/ DROPS OPHTHALMIC at 20:14

## 2021-09-22 RX ADMIN — LINAGLIPTIN 5 MG: 5 TABLET, FILM COATED ORAL at 09:01

## 2021-09-22 RX ADMIN — OXYCODONE HYDROCHLORIDE AND ACETAMINOPHEN 500 MG: 500 TABLET ORAL at 09:01

## 2021-09-22 RX ADMIN — AMLODIPINE BESYLATE 10 MG: 10 TABLET ORAL at 09:01

## 2021-09-22 RX ADMIN — KETOTIFEN FUMARATE 1 DROP: 0.35 SOLUTION/ DROPS OPHTHALMIC at 17:40

## 2021-09-22 RX ADMIN — DOCUSATE SODIUM 50 MG AND SENNOSIDES 8.6 MG 1 TABLET: 8.6; 5 TABLET, FILM COATED ORAL at 20:13

## 2021-09-22 RX ADMIN — PANTOPRAZOLE SODIUM 40 MG: 40 TABLET, DELAYED RELEASE ORAL at 05:46

## 2021-09-22 RX ADMIN — ACETAMINOPHEN 650 MG: 325 TABLET ORAL at 20:13

## 2021-09-22 RX ADMIN — ATORVASTATIN CALCIUM 80 MG: 40 TABLET, FILM COATED ORAL at 20:14

## 2021-09-22 RX ADMIN — INSULIN DETEMIR 20 UNITS: 100 INJECTION, SOLUTION SUBCUTANEOUS at 21:04

## 2021-09-22 RX ADMIN — INSULIN ASPART 3 UNITS: 100 INJECTION, SOLUTION INTRAVENOUS; SUBCUTANEOUS at 12:08

## 2021-09-22 RX ADMIN — ONDANSETRON HYDROCHLORIDE 4 MG: 4 TABLET, FILM COATED ORAL at 20:13

## 2021-09-22 RX ADMIN — CARVEDILOL 12.5 MG: 6.25 TABLET, FILM COATED ORAL at 09:01

## 2021-09-22 RX ADMIN — Medication 10 MG: at 20:14

## 2021-09-22 RX ADMIN — KETOTIFEN FUMARATE 1 DROP: 0.35 SOLUTION/ DROPS OPHTHALMIC at 09:01

## 2021-09-22 RX ADMIN — SERTRALINE 100 MG: 50 TABLET, FILM COATED ORAL at 09:00

## 2021-09-22 RX ADMIN — CHLORTHALIDONE 25 MG: 50 TABLET ORAL at 09:01

## 2021-09-22 RX ADMIN — APIXABAN 5 MG: 5 TABLET, FILM COATED ORAL at 20:14

## 2021-09-22 NOTE — PROGRESS NOTES
Case Management  Inpatient Rehabilitation Team Conference    Conference Date/Time: 9/21/2021 7:49:03 AM    Team Conference Attendees:  MD Kenyatta Titus SW Jessica Bill, RN,   BRITTA Hinton, PT  Tiff Monge OT    Demographics            Age: 81Y            Gender: Female    Admission Date: 8/27/2021 10:38:00 AM  Rehabilitation Diagnosis:  left CVA  Comorbidities: G81.91 Hemiplegia, unspecified affecting right dominant side  R47.1 Dysarthria and anarthria  R13.10 Dysphagia, unspecified  I65.02 Occlusion and stenosis of left vertebral artery  I10 Essential (primary) hypertension  E78.5 Hyperlipidemia, unspecified  E03.9 Hypothyroidism, unspecified  K21.9 Gastro-esophageal reflux disease without esophagitis  K59.00 Constipation, unspecified  F32.9 Major depressive disorder, single episode, unspecified  Z87.440 Personal history of urinary (tract) infections  Z87.01 Personal history of pneumonia (recurrent)      Plan of Care  Anticipated Discharge Date/Estimated Length of Stay: 10-1-21  Anticipated Discharge Destination: Community discharge with assistance  Discharge Plan : Pt plans to return home with family providing assistance if  needed.  Medical Necessity Expected Level Rationale: fair  Intensity and Duration: an average of 3 hours/5 days per week  Medical Supervision and 24 Hour Rehab Nursing: x  Physical Therapy: x  PT Intensity/Duration: PT 1-1.5 hours per day/5 days per week  Occupational Therapy: x  OT Intensity/Duration: OT 1-1.5 hours per day/5 days per week  Speech and Language Therapy: x  SLP Intensity/Duration: SLP 30 mins-90 mins per day/5 days per week  Orthotics/Prosthetics: x  Social Work: x  Therapeutic Recreation: x  Updated (if changes indicated)    Anticipated Discharge Date/Estimated Length of Stay:   9-24-21      Discharge Plan of Care:    Based on the patient's medical and functional status, their prognosis and  expected level of  functional improvement is: fair      Interdisciplinary Problem/Goals/Status  Communication    [ST] Dysarthria(Active)  Current Status(09/14/2021): Pt presents w/ a mild dysarthria negatively  impacting communication in adls.  Weekly Goal(09/20/2021): R facial massage  Discharge Goal: Pt will improve motor speech to allow for maximal commmuncation  in adls.        Mobility    [PT] Bed Mobility(Active)  Current Status(08/28/2021): ModA  Weekly Goal(09/04/2021): ModA/Alyssa  Discharge Goal: Alyssa    [PT] Transfers(Active)  Current Status(08/28/2021): Max/Dep  Weekly Goal(09/04/2021): ModA/MaxA  Discharge Goal: ModA    [PT] Walk(Active)  Current Status(08/28/2021): N/A  Weekly Goal(09/04/2021): 2 Steps  Discharge Goal: 5 feet ModA    [PT] Stairs(Active)  Current Status(08/28/2021): N/A  Weekly Goal(09/04/2021): 1 step  Discharge Goal: 5 steps min/modA        Pain    [RN] Pain Management(Active)  Current Status(08/27/2021): potential for pain  Weekly Goal(09/24/2021): No pain this week  Discharge Goal: No pain        Safety    [RN] Potential for Injury(Active)  Current Status(08/27/2021): At risk for injury  Weekly Goal(09/24/2021): No injury this week  Discharge Goal: No injuries        Self Care    [OT] Dressing (Upper)(Active)  Current Status(09/20/2021): max/mod  Weekly Goal(09/21/2021): mod  Discharge Goal: ModA        Swallow Function    [ST] Swallowing(Active)  Current Status(09/14/2021): Nectar thick liquids  Weekly Goal(09/20/2021): Tolerate facial massage to R facial surface  Discharge Goal: Least restrictive po diet    Comments: Recommend pt go home sooner with family providing assistance of two  people if possible at discharge.    Signed by: LATONYA Hector    Physician CoSigned By: Garrett Sneed 09/22/2021 10:27:28

## 2021-09-22 NOTE — THERAPY TREATMENT NOTE
Inpatient Rehabilitation - Occupational Therapy Treatment Note     Tre     Patient Name: Joycelyn Brown  : 1940  MRN: [id number]    Today's Date: 2021                 Admit Date: 2021         ICD-10-CM ICD-9-CM   1. CVA (cerebral vascular accident) (CMS/[facility])  I63.9 434.91       Patient Active Problem List   Diagnosis   • CVA (cerebral vascular accident) (CMS/[facility])       History reviewed. No pertinent past medical history.    No past surgical history on file.             IRF OT ASSESSMENT FLOWSHEET (last 12 hours)      IRF OT Evaluation and Treatment     Row Name 21 1400          OT Time and Intention    Document Type  daily treatment  -     Mode of Treatment  individual therapy;occupational therapy  -     Patient Effort  adequate  -     Row Name 21 1400          General Information    Patient/Family/Caregiver Comments/Observations  patient agreeable to therapy  -     Existing Precautions/Restrictions  fall;oxygen therapy device and L/min  -     Row Name 21 1400          Transfers    Bed-Chair Piatt (Transfers)  maximum assist (25% patient effort);2 person assist  -     Row Name 21 1400          Motor Skills    Motor Skills  coordination;functional endurance;therapeutic exercise;neuro-muscular function  -     Therapeutic Exercise  -- RUE ROM, AROM arm skate, wt bearing grasp release right  -     Row Name 21 1400          Lower Body Dressing    Piatt Level (Lower Body Dressing)  maximum assist (25% patient effort)  -     Row Name 21 1400          Modality Intervention (Comprehensive)    Modality Treatment  NMES/FES/Bioness  -     Additional Documentation  -- Right wrist and digit ext  -       User Key  (r) = Recorded By, (t) = Taken By, (c) = Cosigned By    Initials Name Effective Dates     Maria Guadalupe Monge, OT 21 -            Occupational Therapy Education                 Title: PT OT SLP Therapies (Done)      Topic: Occupational Therapy (Done)     Point: ADL training (Done)     Description:   Instruct learner(s) on proper safety adaptation and remediation techniques during self care or transfers.   Instruct in proper use of assistive devices.              Learning Progress Summary           Patient Acceptance, E,TB, VU by DG at 9/18/2021 2225    Acceptance, E,TB, VU by DG at 9/18/2021 0014    Acceptance, E, VU,NR by BF at 9/17/2021 1456    Acceptance, E,TB, VU by DG at 9/16/2021 2222    Acceptance, E,TB, VU by DG at 9/15/2021 2303    Acceptance, E,TB, VU by DG at 9/2/2021 2003    Acceptance, E,D, VU,NR by  at 9/2/2021 1458    Acceptance, E,TB, VU by DG at 9/1/2021 2256    Acceptance, E,D, VU,NR by  at 9/1/2021 1520    Acceptance, E,D, VU,NR by  at 8/31/2021 1541    Acceptance, E, VU,NR by BF at 8/30/2021 1507    Acceptance, E,TB, VU by DG at 8/28/2021 2040    Acceptance, E,D, VU,NR by AB at 8/28/2021 1410                   Point: Home exercise program (Done)     Description:   Instruct learner(s) on appropriate technique for monitoring, assisting and/or progressing therapeutic exercises/activities.              Learning Progress Summary           Patient Acceptance, E,TB, VU by DG at 9/18/2021 2225    Acceptance, E,TB, VU by DG at 9/18/2021 0014    Acceptance, E,TB, VU by DG at 9/16/2021 2222    Acceptance, E,TB, VU by DG at 9/15/2021 2303    Acceptance, E,TB, VU by DG at 9/2/2021 2003    Acceptance, E,TB, VU by DG at 9/1/2021 2256    Acceptance, E,TB, VU by DG at 8/28/2021 2040                   Point: Precautions (Done)     Description:   Instruct learner(s) on prescribed precautions during self-care and functional transfers.              Learning Progress Summary           Patient Acceptance, E,TB, VU by DG at 9/18/2021 2225    Acceptance, E,TB, VU by DG at 9/18/2021 0014    Acceptance, E, VU,NR by BF at 9/17/2021 1456    Acceptance, E,TB, VU by DG at 9/16/2021 2222    Acceptance, E,TB, VU by DG at 9/15/2021  2303    Acceptance, E,TB, VU by  at 9/2/2021 2003    Acceptance, E,D, VU,NR by  at 9/2/2021 1458    Acceptance, E,TB, VU by DG at 9/1/2021 2256    Acceptance, E,D, VU,NR by  at 9/1/2021 1520    Acceptance, E,D, VU,NR by  at 8/31/2021 1541    Acceptance, E, VU,NR by  at 8/30/2021 1507    Acceptance, E,TB, VU by  at 8/28/2021 2040    Acceptance, E,D, VU,NR by AB at 8/28/2021 1410                               User Key     Initials Effective Dates Name Provider Type [facility] 06/16/21 -  Yazmin Chaves, OT Occupational Therapist OT     06/16/21 -  Melissa Swift RN Registered Nurse Nurse     06/16/21 -  Debbie Mehta, OT Occupational Therapist OT     06/16/21 -  Lucrecia Andrews, OT Occupational Therapist OT                    OT Recommendation and Plan                         Time Calculation:     Time Calculation- OT     Row Name 09/22/21 1442             Time Calculation- OT    OT Start Time  0745  -      OT Stop Time  0915  -      OT Time Calculation (min)  90 min  -        User Key  (r) = Recorded By, (t) = Taken By, (c) = Cosigned By    Initials Name Provider Type     Maria Guadalupe Monge, OT Occupational Therapist        Therapy Charges for Today     Code Description Service Date Service Provider Modifiers Qty    33973449544 HC OT SELF CARE/MGMT/TRAIN EA 15 MIN 9/21/2021 Maria Guadalupe Monge OT GO, KX 2    62181847786 HC OT NEUROMUSC RE EDUCATION EA 15 MIN 9/21/2021 Maria Guadalupe Monge OT GO, KX 3    27396883774 HC OT ELEC STIM EA-PER 15 MIN 9/21/2021 Maria Guadalupe Monge OT GO, KX 1    19727422614 HC OT SELF CARE/MGMT/TRAIN EA 15 MIN 9/22/2021 Maria Guadalupe Monge OT GO, KX 2    62144462806 HC OT NEUROMUSC RE EDUCATION EA 15 MIN 9/22/2021 Maria Guadalupe Monge OT GO, KX 3    62596379437 HC OT ELEC STIM EA-PER 15 MIN 9/22/2021 Maria Guadalupe Monge, BOWEN NAVARRO, KX 1                   Maria Guadalupe Monge OT  9/22/2021

## 2021-09-22 NOTE — PROGRESS NOTES
[facility]  PROGRESS NOTE     Patient Identification:  Name:  Joycelyn Brown  Age:  81 y.o.  Sex:  female  :  1940  MRN:  [id number]  Visit Number:  [id number]  ROOM: 110/1S     Primary Care Provider:  Donya Looney APRN    Length of stay in inpatient status:  26    Subjective     Chief Compliant:  No chief complaint on file.      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  Patient is making very slow progress at this time and is being treated for depression as well.  Patient has no new complaints at this time.    Objective     Current Hospital Meds:amLODIPine, 10 mg, Oral, BID  apixaban, 5 mg, Oral, Q12H  ascorbic acid, 500 mg, Oral, Daily  atorvastatin, 80 mg, Oral, Nightly  carvedilol, 12.5 mg, Oral, BID With Meals  chlorthalidone, 25 mg, Oral, Daily  insulin aspart, 0-7 Units, Subcutaneous, TID AC  insulin detemir, 20 Units, Subcutaneous, Q12H  ketotifen, 1 drop, Both Eyes, TID  levothyroxine, 50 mcg, Oral, Q AM  linagliptin, 5 mg, Oral, Daily  melatonin, 10 mg, Oral, Nightly  multivitamin with minerals, 1 tablet, Oral, Daily  pantoprazole, 40 mg, Oral, Q AM  senna-docusate sodium, 1 tablet, Oral, BID  sertraline, 100 mg, Oral, Daily  vitamin B-12, 1,000 mcg, Oral, Daily       ----------------------------------------------------------------------------------------------------------------------  Vital Signs:  Temp:  [98.4 °F (36.9 °C)-98.8 °F (37.1 °C)] 98.4 °F (36.9 °C)  Heart Rate:  [63-74] 74  Resp:  [18] 18  BP: (131-145)/(53-63) 145/63  SpO2:  [97 %-98 %] 97 %  on  Flow (L/min):  [2] 2;   Device (Oxygen Therapy): nasal cannula  Body mass index is 28.19 kg/m².    Wt Readings from Last 3 Encounters:   21 81.6 kg (180 lb)   02/10/15 81.7 kg (180 lb 0.1 oz)     Intake & Output (last 3 days)       701 -  07 -  07 07 0700    P.O.  1140 1160 1060 100    Total Intake(mL/kg) 1140 (14) 1160 (14.2) 1060 (13) 100 (1.2)    Net +1140 +1160 +1060 +100            Urine Unmeasured Occurrence 9 x 7 x 9 x 1 x    Stool Unmeasured Occurrence  2 x 1 x         Diet Soft Texture; Chopped; Thin  ----------------------------------------------------------------------------------------------------------------------  Physical exam:  Constitutional:   No acute distress  HEENT: Normocephalic atraumatic  Neck: Supple   Cardiovascular: Regular rate and rhythm  Pulmonary/Chest: Clear to auscultation  Abdominal: Positive bowel sounds soft.   Musculoskeletal: No arthropathy  Neurological: Right-sided hemiparesis  Skin: No rash  Peripheral vascular:  Genitourinary:  ----------------------------------------------------------------------------------------------------------------------    Last echocardiogram:    ----------------------------------------------------------------------------------------------------------------------  Results from last 7 days   Lab Units 09/19/21  0048   WBC 10*3/mm3 10.25   HEMOGLOBIN g/dL 13.0   HEMATOCRIT % 38.8   MCV fL 90.9   MCHC g/dL 33.5   PLATELETS 10*3/mm3 257         Results from last 7 days   Lab Units 09/19/21  0048 09/16/21  0121   SODIUM mmol/L 141 137   POTASSIUM mmol/L 3.7 3.6   MAGNESIUM mg/dL 2.0  --    CHLORIDE mmol/L 100 98   CO2 mmol/L 31.2* 31.5*   BUN mg/dL 31* 31*   CREATININE mg/dL 1.18* 1.16*   EGFR IF NONAFRICN AM mL/min/1.73 44* 45*   CALCIUM mg/dL 9.4 9.6   GLUCOSE mg/dL 158* 192*   ALBUMIN g/dL 3.41* 3.29*   BILIRUBIN mg/dL 0.3 0.3   ALK PHOS U/L 87 97   AST (SGOT) U/L 22 21   ALT (SGPT) U/L 20 22   Estimated Creatinine Clearance: 41.8 mL/min (A) (by C-G formula based on SCr of 1.18 mg/dL (H)).  No results found for: AMMONIA              Glucose   Date/Time Value Ref Range Status   09/22/2021 0640 157 (H) 70 - 130 mg/dL Final     Comment:     Meter: [id number] : [id number] Jaspreet Lerma   09/21/2021 2016 180  (H) 70 - 130 mg/dL Final     Comment:     Meter: [id number] : [id number] Miko Crystal   09/21/2021 1607 240 (H) 70 - 130 mg/dL Final     Comment:     Meter: [id number] : [id number] Casie Codya   09/21/2021 1107 190 (H) 70 - 130 mg/dL Final     Comment:     Meter: [id number] : [id number] Noah Najma   09/21/2021 0538 166 (H) 70 - 130 mg/dL Final     Comment:     Meter: [id number] : [id number] Sebastian Cervantes C   09/20/2021 2023 219 (H) 70 - 130 mg/dL Final     Comment:     Meter: [id number] : [id number] Sebastian Cervantes C   09/20/2021 1614 204 (H) 70 - 130 mg/dL Final     Comment:     Meter: [id number] : [id number] Noah Najma   09/20/2021 1124 308 (H) 70 - 130 mg/dL Final     Comment:     Meter: [id number] : [id number] Casie Naqvi     No results found for: TSH, FREET4  No results found for: PREGTESTUR, PREGSERUM, HCG, HCGQUANT  Pain Management Panel    There is no flowsheet data to display.       Brief Urine Lab Results     None        No results found for: BLOODCX      No results found for: URINECX  No results found for: WOUNDCX  No results found for: STOOLCX        I have personally looked at the labs and they are summarized above.  ----------------------------------------------------------------------------------------------------------------------  Detailed radiology reports for the last 24 hours:    Imaging Results (Last 24 Hours)     ** No results found for the last 24 hours. **        Final impressions for the last 30 days of radiology reports:    FL Video Swallow Single Contrast    Result Date: 9/10/2021  No evidence of aspiration.  For additional information please see the report provided by the speech therapy service  This report was finalized on 9/10/2021 10:27 AM by Dr. Reg Morales MD.      FL Video Swallow Single Contrast    Result Date: 9/3/2021      Please see the speech pathologist's report for additional information.  This report was finalized on 9/3/2021 12:31  PM by Dr. Anthony Gresham MD.      FL Video Swallow Single Contrast    Result Date: 8/28/2021  1.  Penetration with aspiration of thin liquids. 2. Please see dysphasia notes for further details.  This report was finalized on 8/28/2021 11:49 AM by Dr. Obei Fleming MD.      I have personally looked at the radiology images and read the final radiology report.    Assessment & Plan    Status post CVA in the left MCA pontine distribution with right-sided hemiparesis--patient is ambulating with physical therapy at this time with platform walker.  Will continue statin therapy and Eliquis.  Patient still requiring maximum assistance with ADLs.    Major depression disorder--Zoloft 100 mg/day.  Will monitor for impact    Hypertension reasonably well controlled patient is continue Coreg, chlorthalidone, Norvasc    Hypothyroidism continue Synthroid    Diabetes mellitus fair control.  Continue current treatment    VTE Prophylaxis:   Mechanical Order History:      Ordered        08/27/21 1125  Maintain Sequential Compression Device  Continuous         08/27/21 1125  Place Sequential Compression Device  Once                 Pharmalogical Order History:      Ordered     Dose Route Frequency Stop    08/27/21 1125  apixaban (ELIQUIS) tablet 5 mg      5 mg PO Every 12 Hours Scheduled --                    Garrett Sneed MD  [facility]  09/22/21  11:16 EDT

## 2021-09-22 NOTE — THERAPY TREATMENT NOTE
Inpatient Rehabilitation - Physical Therapy Treatment Note        Tre     Patient Name: Joycelyn Brown  : 1940  MRN: [id number]    Today's Date: 2021                    Admit Date: 2021      Visit Dx:     ICD-10-CM ICD-9-CM   1. CVA (cerebral vascular accident) (CMS/HCC)  I63.9 434.91       Patient Active Problem List   Diagnosis   • CVA (cerebral vascular accident) (CMS/[facility])       History reviewed. No pertinent past medical history.    No past surgical history on file.       PT ASSESSMENT (last 12 hours)      IRF PT Evaluation and Treatment     Row Name 21 151          PT Time and Intention    Document Type  daily treatment  -RF     Mode of Treatment  physical therapy;individual therapy  -RF     Patient/Family/Caregiver Comments/Observations  Pt c/o fatigue and weakness BID.   -RF     Row Name 21 151          General Information    Patient Profile Reviewed  yes  -RF     Existing Precautions/Restrictions  fall;oxygen therapy device and L/min R HP  -RF     Row Name 21 151          Cognition/Psychosocial    Affect/Mental Status (Cognitive)  WFL  -RF     Follows Commands (Cognition)  WFL  -RF     Row Name 21 151          Pain Scale: FACES Pre/Post-Treatment    Pain: FACES Scale, Pretreatment  0-->no hurt  -RF     Posttreatment Pain Rating  0-->no hurt  -RF     Row Name 21 1511          Bed Mobility    Rolling Left [place] (Bed Mobility)  moderate assist (50% patient effort) for dressing/changing  -RF     Rolling Right [place] (Bed Mobility)  minimum assist (75% patient effort);moderate assist (50% patient effort)  -RF     Supine-Sit [place] (Bed Mobility)  maximum assist (25% patient effort);2 person assist  -RF     Sit-Supine [place] (Bed Mobility)  maximum assist (25% patient effort);2 person assist  -RF     Assistive Device (Bed Mobility)  bed rails  -RF     Row Name 21 151          Transfers    Bed-Chair [place] (Transfers)   maximum assist (25% patient effort);dependent (less than 25% patient effort);2 person assist  -RF     Chair-Bed [place] (Transfers)  maximum assist (25% patient effort);dependent (less than 25% patient effort);2 person assist  -RF     Assistive Device (Bed-Chair Transfers)  wheelchair  -RF     Sit-Stand [place] (Transfers)  moderate assist (50% patient effort);2 person assist  -RF     Stand-Sit [place] (Transfers)  moderate assist (50% patient effort);2 person assist  -RF     Row Name 09/22/21 1511          Chair-Bed Transfer    Assistive Device (Chair-Bed Transfers)  wheelchair  -RF     Row Name 09/22/21 1511          Sit-Stand Transfer    Assistive Device (Sit-Stand Transfers)  other (see comments) parallel bars  -RF     Row Name 09/22/21 1511          Stand-Sit Transfer    Assistive Device (Stand-Sit Transfers)  other (see comments) parallel bars  -RF     Row Name 09/22/21 1511          Gait/Stairs (Locomotion)    Gait/Stairs Locomotion  gait/ambulation assistive device  -RF     [place] Level (Gait)  2 person assist;maximum assist (25% patient effort)  -RF     Assistive Device (Gait)  walker, platform  -RF     Distance in Feet (Gait)  10'X4  -RF     Pattern (Gait)  step-to;step-through  -RF     Bilateral Gait Deviations  knee buckling, right side  -RF     Right Sided Gait Deviations  knee buckling, right side;weight shift ability decreased  -RF     Comment (Gait/Stairs)  Pt requires cuing for technique and tactile cuing for quad/glut activation. Improved pattern noted, however significant assistance continually required.   -RF     Row Name 09/22/21 1511          Safety Issues, Functional Mobility    Impairments Affecting Function (Mobility)  balance;endurance/activity tolerance;muscle tone abnormal;postural/trunk control;strength  -RF     Row Name 09/22/21 1511          Hip (Therapeutic Exercise)    Hip Strengthening (Therapeutic Exercise)   bilateral;flexion;extension;aBduction;aDduction;external rotation;internal rotation;heel slides;supine;2 sets;10 repetitions;other (see comments) AAROM req on RLE  -RF     Row Name 09/22/21 1511          Knee (Therapeutic Exercise)    Knee Strengthening (Therapeutic Exercise)  bilateral;flexion;extension;heel slides;SLR (straight leg raise);SAQ (short arc quad);hamstring curls;supine;2 sets;10 repetitions;other (see comments) AAROM req on RLE  -RF     Row Name 09/22/21 1511          Ankle (Therapeutic Exercise)    Ankle Strengthening (Therapeutic Exercise)  bilateral;dorsiflexion;plantarflexion;supine;2 sets;10 repetitions;other (see comments) AAROM req on RLE  -RF     Row Name 09/22/21 1511          [place] Electrical Stimulation Treatment    Location 1 ([place] E-Stim Treatment)  lower extremity treatment area;other (see comments) R ant tib  -RF     Indications ([place] E-Stim Treatment)  enhance muscle contraction  -RF     Treatment Details ([place] Electrical Stimulation Treatment)  15 mins, 10/20, 41 mA  -RF     Response to Treatment ([place] E-Stim Treatment)  muscle contraction enhanced  -RF     Comment ([place] E-Stim Treatment)  Pt cued for sustained DF with on cycle  -RF     Row Name 09/22/21 1511          Bed Mobility Goal 1 (PT-IRF)    Activity/Assistive Device (Bed Mobility Goal 1, PT-IRF)  bed mobility activities, all  -RF     [place] Level (Bed Mobility Goal 1, PT-IRF)  standby assist  -RF     Time Frame (Bed Mobility Goal 1, PT-IRF)  long-term goal (LTG)  -RF     Progress/Outcomes (Bed Mobility Goal 1, PT-IRF)  goal ongoing  -RF     Row Name 09/22/21 1511          Transfer Goal 1 (PT-IRF)    Activity/Assistive Device (Transfer Goal 1, PT-IRF)  all transfers  -RF     [place] Level (Transfer Goal 1, PT-IRF)  minimum assist (75% or more patient effort)  -RF     Time Frame (Transfer Goal 1, PT-IRF)  by discharge  -RF     Progress/Outcomes (Transfer Goal 1, PT-IRF)  goal ongoing  -RF     Row  Name 09/22/21 1511          Gait/Walking Locomotion Goal 1 (PT-IRF)    Activity/Assistive Device (Gait/Walking Locomotion Goal 1, PT-IRF)  gait (walking locomotion);assistive device use  -RF     Gait/Walking Locomotion Distance Goal 1 (PT-IRF)  5  -RF     [place] Level (Gait/Walking Locomotion Goal 1, PT-IRF)  minimum assist (75% or more patient effort)  -RF     Time Frame (Gait/Walking Locomotion Goal 1, PT-IRF)  long-term goal (LTG)  -RF     Progress/Outcomes (Gait/Walking Locomotion Goal 1, PT-IRF)  goal ongoing  -RF     Row Name 09/22/21 1511          Stairs Goal 1 (PT-IRF)    Activity/Assistive Device (Stairs Goal 1, PT-IRF)  stairs, all skills;ascending stairs;descending stairs;using handrail, left  -RF     Number of Stairs (Stairs Goal 1, PT-IRF)  5  -RF     [place] Level (Stairs Goal 1, PT-IRF)  minimum assist (75% or more patient effort);moderate assist (50-74% patient effort)  -RF     Time Frame (Stairs Goal 1, PT-IRF)  long-term goal (LTG)  -RF     Progress/Outcomes (Stairs Goal 1, PT-IRF)  goal ongoing  -RF     Row Name 09/22/21 1511          Positioning and Restraints    Pre-Treatment Position  sitting in chair/recliner BID  -RF     In Bed  supine;call light within reach;encouraged to call for assist PM  -RF     In Wheelchair  sitting;call light within reach;encouraged to call for assist AM  -RF     Row Name 09/22/21 1511          Therapy Assessment/Plan (PT)    Rehab Potential/Prognosis (PT)  adequate, monitor progress closely  -RF     Frequency of Treatment (PT)  5 times per week  -RF     Estimated Duration of Therapy (PT)  2 weeks  -RF     Problem List (PT)  problems related to;balance;hemiparesis/hemiplegia;mobility;strength  -RF     Row Name 09/22/21 1511          Daily Progress Summary (PT)    Functional Goal Overall Progress (PT)  progressing toward functional goals slower than expected  -RF     Daily Progress Summary (PT)  Patient continues to be self-limiting and requires increased  cuing for participation and technique. Some improvements note in gait training with PRW. Continued skilled care required for further improvements to ensure maximum safe function upon D/C.   -RF     Impairments Still Limiting Function (PT)  sensation decreased;strength decreased;impaired balance;coordination impaired;impaired functional activity tolerance;motor control impaired;postural control impaired;pain  -RF     Recommendations (PT)  Continue per current POC   -RF       User Key  (r) = Recorded By, (t) = Taken By, (c) = Cosigned By    Initials Name Provider Type    RF Malinda Alicea PTA Physical Therapy Assistant           Physical Therapy Education                 Title: PT OT SLP Therapies (Done)     Topic: Physical Therapy (Done)     Point: Mobility training (Done)     Learning Progress Summary           Patient Acceptance, E,TB, VU by RF at 9/22/2021 1515    Acceptance, E,TB, VU by RF at 9/21/2021 1547    Acceptance, E,TB, VU by RF at 9/20/2021 1558    Acceptance, E,TB, VU by DG at 9/18/2021 2225    Acceptance, E,TB, VU by DG at 9/18/2021 0014    Acceptance, E,TB, VU by DG at 9/16/2021 2222    Acceptance, E,TB, VU by DG at 9/15/2021 2303    Acceptance, E,TB, VU by RF at 9/15/2021 1603    Acceptance, E,TB, VU by RF at 9/14/2021 1614    Acceptance, E, VU,NR by LB at 9/13/2021 1121    Acceptance, E,TB, VU by RF at 9/10/2021 1556    Acceptance, E,TB, VU by RF at 9/9/2021 1551    Acceptance, E,TB, VU by RF at 9/8/2021 1602    Acceptance, E,TB, VU by RF at 9/7/2021 1605    Acceptance, E,TB, VU by RF at 9/6/2021 1557    Acceptance, E,TB, VU by RF at 9/3/2021 1532    Acceptance, E,TB, VU by DG at 9/2/2021 2003    Acceptance, E,TB, VU by RF at 9/2/2021 1610    Acceptance, E,TB, VU by DG at 9/1/2021 2256    Acceptance, E,TB, VU by RF at 9/1/2021 1548    Acceptance, E,TB, VU by RF at 8/31/2021 1423    Acceptance, E,TB, VU by RF at 8/30/2021 1548                   Point: Home exercise program (Done)     Learning  Progress Summary           Patient Acceptance, E,TB, VU by RF at 9/22/2021 1515    Acceptance, E,TB, VU by RF at 9/21/2021 1547    Acceptance, E,TB, VU by RF at 9/20/2021 1558    Acceptance, E,TB, VU by DG at 9/18/2021 2225    Acceptance, E,TB, VU by DG at 9/18/2021 0014    Acceptance, E,TB, VU by DG at 9/16/2021 2222    Acceptance, E,TB, VU by DG at 9/15/2021 2303    Acceptance, E,TB, VU by RF at 9/15/2021 1603    Acceptance, E,TB, VU by RF at 9/14/2021 1614    Acceptance, E, VU,NR by LB at 9/13/2021 1121    Acceptance, E,TB, VU by RF at 9/10/2021 1556    Acceptance, E,TB, VU by RF at 9/9/2021 1551    Acceptance, E,TB, VU by RF at 9/8/2021 1602    Acceptance, E,TB, VU by RF at 9/7/2021 1605    Acceptance, E,TB, VU by RF at 9/6/2021 1557    Acceptance, E,TB, VU by RF at 9/3/2021 1532    Acceptance, E,TB, VU by DG at 9/2/2021 2003    Acceptance, E,TB, VU by RF at 9/2/2021 1610    Acceptance, E,TB, VU by DG at 9/1/2021 2256    Acceptance, E,TB, VU by RF at 9/1/2021 1548    Acceptance, E,TB, VU by RF at 8/31/2021 1423    Acceptance, E,TB, VU by RF at 8/30/2021 1548                   Point: Body mechanics (Done)     Learning Progress Summary           Patient Acceptance, E,TB, VU by RF at 9/22/2021 1515    Acceptance, E,TB, VU by RF at 9/21/2021 1547    Acceptance, E,TB, VU by RF at 9/20/2021 1558    Acceptance, E,TB, VU by DG at 9/18/2021 2225    Acceptance, E,TB, VU by DG at 9/18/2021 0014    Acceptance, E,TB, VU by DG at 9/16/2021 2222    Acceptance, E,TB, VU by DG at 9/15/2021 2303    Acceptance, E,TB, VU by RF at 9/15/2021 1603    Acceptance, E,TB, VU by RF at 9/14/2021 1614    Acceptance, E, VU,NR by LB at 9/13/2021 1121    Acceptance, E,TB, VU by RF at 9/10/2021 1556    Acceptance, E,TB, VU by RF at 9/9/2021 1551    Acceptance, E,TB, VU by RF at 9/8/2021 1602    Acceptance, E,TB, VU by RF at 9/7/2021 1605    Acceptance, E,TB, VU by RF at 9/6/2021 1557    Acceptance, E,TB, VU by RF at 9/3/2021 1532    Acceptance,  E,TB, VU by DG at 9/2/2021 2003    Acceptance, E,TB, VU by RF at 9/2/2021 1610    Acceptance, E,TB, VU by DG at 9/1/2021 2256    Acceptance, E,TB, VU by RF at 9/1/2021 1548    Acceptance, E,TB, VU by RF at 8/31/2021 1423    Acceptance, E,TB, VU by RF at 8/30/2021 1548                   Point: Precautions (Done)     Learning Progress Summary           Patient Acceptance, E,TB, VU by RF at 9/22/2021 1515    Acceptance, E,TB, VU by RF at 9/21/2021 1547    Acceptance, E,TB, VU by RF at 9/20/2021 1558    Acceptance, E,TB, VU by DG at 9/18/2021 2225    Acceptance, E,TB, VU by DG at 9/18/2021 0014    Acceptance, E,TB, VU by DG at 9/16/2021 2222    Acceptance, E,TB, VU by DG at 9/15/2021 2303    Acceptance, E,TB, VU by RF at 9/15/2021 1603    Acceptance, E,TB, VU by RF at 9/14/2021 1614    Acceptance, E, VU,NR by LB at 9/13/2021 1121    Acceptance, E,TB, VU by RF at 9/10/2021 1556    Acceptance, E,TB, VU by RF at 9/9/2021 1551    Acceptance, E,TB, VU by RF at 9/8/2021 1602    Acceptance, E,TB, VU by RF at 9/7/2021 1605    Acceptance, E,TB, VU by RF at 9/6/2021 1557    Acceptance, E,TB, VU by RF at 9/3/2021 1532    Acceptance, E,TB, VU by DG at 9/2/2021 2003    Acceptance, E,TB, VU by RF at 9/2/2021 1610    Acceptance, E,TB, VU by DG at 9/1/2021 2256    Acceptance, E,TB, VU by RF at 9/1/2021 1548    Acceptance, E,TB, VU by RF at 8/31/2021 1423    Acceptance, E,TB, VU by RF at 8/30/2021 1548                               User Key     Initials Effective Dates Name Provider Type Discipline    DG 06/16/21 -  Melissa Swift, RN Registered Nurse Nurse    LB 06/16/21 -  Anju Reis, PT Physical Therapist PT    RF 06/16/21 -  Malinda Alicea PTA Physical Therapy Assistant PT                PT Recommendation and Plan    Frequency of Treatment (PT): 5 times per week     Daily Progress Summary (PT)  Functional Goal Overall Progress (PT): progressing toward functional goals slower than expected  Daily Progress Summary (PT):  Patient continues to be self-limiting and requires increased cuing for participation and technique. Some improvements note in gait training with PRW. Continued skilled care required for further improvements to ensure maximum safe function upon D/C.   Impairments Still Limiting Function (PT): sensation decreased, strength decreased, impaired balance, coordination impaired, impaired functional activity tolerance, motor control impaired, postural control impaired, pain  Recommendations (PT): Continue per current POC                Time Calculation:     PT Charges     Row Name 09/22/21 1517 09/22/21 1516          Time Calculation    Start Time  1245  -RF  0915  -RF     Stop Time  1330  -RF  1000  -RF     Time Calculation (min)  45 min  -RF  45 min  -RF     PT Received On  09/22/21  -RF  09/22/21  -RF     PT - Next Appointment  09/23/21  -RF  09/22/21  -RF     PT Goal Re-Cert Due Date  10/01/21  -RF  10/01/21  -RF        Time Calculation- PT    Total Timed Code Minutes- PT  45 minute(s)  -RF  45 minute(s)  -RF       User Key  (r) = Recorded By, (t) = Taken By, (c) = Cosigned By    Initials Name Provider Type    RF Malinda Alicea PTA Physical Therapy Assistant          Therapy Charges for Today     Code Description Service Date Service Provider Modifiers Qty    88115738896 HC GAIT TRAINING EA 15 MIN 9/21/2021 Malinda Alicea PTA GP, KX 1    52599723685 HC PT NEUROMUSC RE EDUCATION EA 15 MIN 9/21/2021 Malinda Alicea PTA GP, KX 2    00751299912 HC PT THER PROC EA 15 MIN 9/21/2021 Malinda Alicea PTA GP, KX 2    76843538891 HC PT THERAPEUTIC ACT EA 15 MIN 9/21/2021 Malinda Alicea PTA GP, KX 1    79323982771 HC GAIT TRAINING EA 15 MIN 9/22/2021 Malinda Alicea, PTA GP, KX 2    78764012515 HC PT NEUROMUSC RE EDUCATION EA 15 MIN 9/22/2021 Malinda Alicea PTA GP, KX 1    45454672376 HC PT THER PROC EA 15 MIN 9/22/2021 Malinda Alicea PTA GP, KX 2    79619433612 HC PT THERAPEUTIC ACT EA 15 MIN 9/22/2021  Nixon, Malinda MEZA, PTA GP, KX 1                   Malinda Alicea, CROW  9/22/2021

## 2021-09-22 NOTE — SIGNIFICANT NOTE
09/22/21 1540   Plan   Plan SS received call from daughter Missy about pt going home on 9-24-21.  Informed daughter pt requires mod-max assistance x 2 people with transfers, recommended for home health services, hospital bed, wheelchair and BSC.  PT has to check pt's O2 sats for home oxygen.  Explained PT does not recommend ordering platform RW at this time, however, may need in the near future.  Daughter was concerned [facility] would not have OT but she called and verified they do have OT.  Informed daughter SS will order home health and DME on 9-23-21.  Discussed getting [facility] accessible van to transport pt home in the afternoon on 9-23-21.  Daughter also concerned about incontinent supplies and says Medicaid will pay for these items but concerned pt will not get any of those supplies until later on.  Encouraged family to buy incontinent supplies until home health can supply.  Pt to return home with family providing 24 hour assistance.   Patient/Family in Agreement with Plan yes      09/22/21 1540   Plan   Plan SS received call from daughter Missy about pt going home on 9-24-21.  Informed daughter pt requires mod-max assistance x 2 people with transfers, recommended for home health services, hospital bed, wheelchair and BSC.  PT has to check pt's O2 sats for home oxygen.  Explained PT does not recommend ordering platform RW at this time, however, may need in the near future.  Daughter was concerned [facility] would not have OT but she called and verified they do have OT.  Informed daughter SS will order home health and DME on 9-23-21.  Discussed getting [facility] accessible van to transport pt home in the afternoon on 9-23-21.  Daughter also concerned about incontinent supplies and says Medicaid will pay for these items but concerned pt will not get any of those supplies until later on.  Encouraged family to buy incontinent supplies until home health can supply.  Pt to return home with family  providing 24 hour assistance.   Patient/Family in Agreement with Plan yes

## 2021-09-23 LAB
GLUCOSE BLDC GLUCOMTR-MCNC: 138 MG/DL (ref 70–130)
GLUCOSE BLDC GLUCOMTR-MCNC: 186 MG/DL (ref 70–130)
GLUCOSE BLDC GLUCOMTR-MCNC: 226 MG/DL (ref 70–130)
GLUCOSE BLDC GLUCOMTR-MCNC: 238 MG/DL (ref 70–130)

## 2021-09-23 PROCEDURE — 97112 NEUROMUSCULAR REEDUCATION: CPT | Performed by: OCCUPATIONAL THERAPIST

## 2021-09-23 PROCEDURE — 99231 SBSQ HOSP IP/OBS SF/LOW 25: CPT | Performed by: FAMILY MEDICINE

## 2021-09-23 PROCEDURE — 97112 NEUROMUSCULAR REEDUCATION: CPT

## 2021-09-23 PROCEDURE — 94799 UNLISTED PULMONARY SVC/PX: CPT

## 2021-09-23 PROCEDURE — 82962 GLUCOSE BLOOD TEST: CPT

## 2021-09-23 PROCEDURE — 63710000001 INSULIN ASPART PER 5 UNITS: Performed by: INTERNAL MEDICINE

## 2021-09-23 PROCEDURE — 97116 GAIT TRAINING THERAPY: CPT

## 2021-09-23 PROCEDURE — 63710000001 INSULIN DETEMIR PER 5 UNITS: Performed by: INTERNAL MEDICINE

## 2021-09-23 PROCEDURE — 97530 THERAPEUTIC ACTIVITIES: CPT | Performed by: OCCUPATIONAL THERAPIST

## 2021-09-23 PROCEDURE — 97110 THERAPEUTIC EXERCISES: CPT

## 2021-09-23 RX ADMIN — PANTOPRAZOLE SODIUM 40 MG: 40 TABLET, DELAYED RELEASE ORAL at 05:08

## 2021-09-23 RX ADMIN — Medication 10 MG: at 21:45

## 2021-09-23 RX ADMIN — KETOTIFEN FUMARATE 1 DROP: 0.35 SOLUTION/ DROPS OPHTHALMIC at 16:46

## 2021-09-23 RX ADMIN — APIXABAN 5 MG: 5 TABLET, FILM COATED ORAL at 21:45

## 2021-09-23 RX ADMIN — INSULIN DETEMIR 20 UNITS: 100 INJECTION, SOLUTION SUBCUTANEOUS at 10:03

## 2021-09-23 RX ADMIN — KETOTIFEN FUMARATE 1 DROP: 0.35 SOLUTION/ DROPS OPHTHALMIC at 09:11

## 2021-09-23 RX ADMIN — CARVEDILOL 12.5 MG: 6.25 TABLET, FILM COATED ORAL at 09:11

## 2021-09-23 RX ADMIN — Medication 1 TABLET: at 09:11

## 2021-09-23 RX ADMIN — LEVOTHYROXINE SODIUM 50 MCG: 50 TABLET ORAL at 05:08

## 2021-09-23 RX ADMIN — OXYCODONE HYDROCHLORIDE AND ACETAMINOPHEN 500 MG: 500 TABLET ORAL at 09:11

## 2021-09-23 RX ADMIN — AMLODIPINE BESYLATE 10 MG: 10 TABLET ORAL at 09:11

## 2021-09-23 RX ADMIN — DOCUSATE SODIUM 50 MG AND SENNOSIDES 8.6 MG 1 TABLET: 8.6; 5 TABLET, FILM COATED ORAL at 21:45

## 2021-09-23 RX ADMIN — CARVEDILOL 12.5 MG: 6.25 TABLET, FILM COATED ORAL at 17:50

## 2021-09-23 RX ADMIN — DOCUSATE SODIUM 50 MG AND SENNOSIDES 8.6 MG 1 TABLET: 8.6; 5 TABLET, FILM COATED ORAL at 09:11

## 2021-09-23 RX ADMIN — Medication 1000 MCG: at 09:11

## 2021-09-23 RX ADMIN — INSULIN DETEMIR 20 UNITS: 100 INJECTION, SOLUTION SUBCUTANEOUS at 21:47

## 2021-09-23 RX ADMIN — LINAGLIPTIN 5 MG: 5 TABLET, FILM COATED ORAL at 09:11

## 2021-09-23 RX ADMIN — INSULIN ASPART 3 UNITS: 100 INJECTION, SOLUTION INTRAVENOUS; SUBCUTANEOUS at 16:47

## 2021-09-23 RX ADMIN — CHLORTHALIDONE 25 MG: 50 TABLET ORAL at 09:11

## 2021-09-23 RX ADMIN — KETOTIFEN FUMARATE 1 DROP: 0.35 SOLUTION/ DROPS OPHTHALMIC at 21:45

## 2021-09-23 RX ADMIN — APIXABAN 5 MG: 5 TABLET, FILM COATED ORAL at 09:11

## 2021-09-23 RX ADMIN — AMLODIPINE BESYLATE 10 MG: 10 TABLET ORAL at 21:45

## 2021-09-23 RX ADMIN — ATORVASTATIN CALCIUM 80 MG: 40 TABLET, FILM COATED ORAL at 21:45

## 2021-09-23 RX ADMIN — SERTRALINE 100 MG: 50 TABLET, FILM COATED ORAL at 09:11

## 2021-09-23 RX ADMIN — INSULIN ASPART 3 UNITS: 100 INJECTION, SOLUTION INTRAVENOUS; SUBCUTANEOUS at 12:01

## 2021-09-23 NOTE — SIGNIFICANT NOTE
09/23/21 1220   Plan   Plan Received call from Flora with HH who states pt's PCP office is temporarily closed in [facility], therefore, BETH Shanks is working at the [facility] in [place].  Flora scheduled pt an appointment wiBETH Pierce on 9-27-21 at 9:00 am.

## 2021-09-23 NOTE — SIGNIFICANT NOTE
09/23/21 1150   Plan   Plan Spoke to Jeramy with [facility] [phone] who states having hospital bed and their technician will meet the [place] office's technician to provide bed.  Spoke to Tegan with [facility] [phone] who states hospital bed and bedside commode will be delivered to pt's home before 12:00 pm tomorrow.  [facility] to be contacted about delivery.  [phone] per Flora about daughter's request for aide services.  Nursing will evaluate pt for aide service which can be provided 1 x week x 2 weeks if needed.  Flora recommends family contact [facility] about Waiver services since they only provide skilled services.   attempted to contact [facility] [phone] without success.  Spoke to barb Louis [phone] with this information who will call [facility]; provided phone number.

## 2021-09-23 NOTE — PROGRESS NOTES
[facility]  PROGRESS NOTE     Patient Identification:  Name:  Joycelyn Brown  Age:  81 y.o.  Sex:  female  :  1940  MRN:  [id number]  Visit Number:  [id number]  ROOM: 110/1S     Primary Care Provider:  Donya Looney APRN    Length of stay in inpatient status:  27    Subjective     Chief Compliant:  No chief complaint on file.      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  Patient continues to make slow progress.  Patient has had difficulties with depression which has been a long-term issue but also exacerbated by her current situation.  We did increase her Zoloft a couple of days ago she states she is tolerating this fine at this time.    Objective     Current Hospital Meds:amLODIPine, 10 mg, Oral, BID  apixaban, 5 mg, Oral, Q12H  ascorbic acid, 500 mg, Oral, Daily  atorvastatin, 80 mg, Oral, Nightly  carvedilol, 12.5 mg, Oral, BID With Meals  chlorthalidone, 25 mg, Oral, Daily  insulin aspart, 0-7 Units, Subcutaneous, TID AC  insulin detemir, 20 Units, Subcutaneous, Q12H  ketotifen, 1 drop, Both Eyes, TID  levothyroxine, 50 mcg, Oral, Q AM  linagliptin, 5 mg, Oral, Daily  melatonin, 10 mg, Oral, Nightly  multivitamin with minerals, 1 tablet, Oral, Daily  pantoprazole, 40 mg, Oral, Q AM  senna-docusate sodium, 1 tablet, Oral, BID  sertraline, 100 mg, Oral, Daily  vitamin B-12, 1,000 mcg, Oral, Daily       ----------------------------------------------------------------------------------------------------------------------  Vital Signs:  Temp:  [97.8 °F (36.6 °C)-98 °F (36.7 °C)] 97.8 °F (36.6 °C)  Heart Rate:  [61-62] 61  Resp:  [18-20] 20  BP: (120-124)/(56-58) 120/56  SpO2:  [96 %-98 %] 96 %  on  Flow (L/min):  [2] 2;   Device (Oxygen Therapy): nasal cannula  Body mass index is 28.19 kg/m².    Wt Readings from Last 3 Encounters:   21 81.6 kg (180 lb)   02/10/15 81.7 kg (180 lb 0.1 oz)     Intake &  Output (last 3 days)       09/20 0701 - 09/21 0700 09/21 0701 - 09/22 0700 09/22 0701 - 09/23 0700 09/23 0701 - 09/24 0700    P.O. 1160 1060 820 240    Total Intake(mL/kg) 1160 (14.2) 1060 (13) 820 (10) 240 (2.9)    Net +1160 +1060 +820 +240            Urine Unmeasured Occurrence 7 x 9 x 8 x 1 x    Stool Unmeasured Occurrence 2 x 1 x          Diet Soft Texture; Chopped; Thin  ----------------------------------------------------------------------------------------------------------------------  Physical exam:  Constitutional:   81-year-old female is in no acute distress  HEENT: Normocephalic atraumatic  Neck: Supple   Cardiovascular: Regular rate and rhythm  Pulmonary/Chest: Clear to auscultation  Abdominal: Positive bowel sounds soft.   Musculoskeletal: No obvious arthropathy  Neurological: Right-sided hemiparesis  Skin: No rash  Peripheral vascular:  Genitourinary:  ----------------------------------------------------------------------------------------------------------------------    Last echocardiogram:    ----------------------------------------------------------------------------------------------------------------------  Results from last 7 days   Lab Units 09/19/21  0048   WBC 10*3/mm3 10.25   HEMOGLOBIN g/dL 13.0   HEMATOCRIT % 38.8   MCV fL 90.9   MCHC g/dL 33.5   PLATELETS 10*3/mm3 257         Results from last 7 days   Lab Units 09/19/21  0048   SODIUM mmol/L 141   POTASSIUM mmol/L 3.7   MAGNESIUM mg/dL 2.0   CHLORIDE mmol/L 100   CO2 mmol/L 31.2*   BUN mg/dL 31*   CREATININE mg/dL 1.18*   EGFR IF NONAFRICN AM mL/min/1.73 44*   CALCIUM mg/dL 9.4   GLUCOSE mg/dL 158*   ALBUMIN g/dL 3.41*   BILIRUBIN mg/dL 0.3   ALK PHOS U/L 87   AST (SGOT) U/L 22   ALT (SGPT) U/L 20   Estimated Creatinine Clearance: 41.8 mL/min (A) (by C-G formula based on SCr of 1.18 mg/dL (H)).  No results found for: AMMONIA              Glucose   Date/Time Value Ref Range Status   09/23/2021 1113 238 (H) 70 - 130 mg/dL Final      Comment:     Meter: [id number] : [id number] Casie Codya   09/23/2021 0619 138 (H) 70 - 130 mg/dL Final     Comment:     Meter: [id number] : [id number] Jaspreet Coronado Maria Guadalupe   09/22/2021 2002 283 (H) 70 - 130 mg/dL Final     Comment:     Meter: [id number] : [id number] Miko Crystal   09/22/2021 1614 240 (H) 70 - 130 mg/dL Final     Comment:     Meter: [id number] : [id number] Casie Donya   09/22/2021 1124 231 (H) 70 - 130 mg/dL Final     Comment:     Meter: [id number] : [id number] Noah Najma   09/22/2021 0640 157 (H) 70 - 130 mg/dL Final     Comment:     Meter: [id number] : [id number] Jaspreet Coronado Maria Guadalupe   09/21/2021 2016 180 (H) 70 - 130 mg/dL Final     Comment:     Meter: [id number] : [id number] Miko Crystal   09/21/2021 1607 240 (H) 70 - 130 mg/dL Final     Comment:     Meter: [id number] : [id number] Casie Naqvi     No results found for: TSH, FREET4  No results found for: PREGTESTUR, PREGSERUM, HCG, HCGQUANT  Pain Management Panel    There is no flowsheet data to display.       Brief Urine Lab Results     None        No results found for: BLOODCX      No results found for: URINECX  No results found for: WOUNDCX  No results found for: STOOLCX        I have personally looked at the labs and they are summarized above.  ----------------------------------------------------------------------------------------------------------------------  Detailed radiology reports for the last 24 hours:    Imaging Results (Last 24 Hours)     ** No results found for the last 24 hours. **        Final impressions for the last 30 days of radiology reports:    FL Video Swallow Single Contrast    Result Date: 9/10/2021  No evidence of aspiration.  For additional information please see the report provided by the speech therapy service  This report was finalized on 9/10/2021 10:27 AM by Dr. Reg Morales MD.      FL Video Swallow Single Contrast    Result Date: 9/3/2021      Please see the speech  pathologist's report for additional information.  This report was finalized on 9/3/2021 12:31 PM by Dr. Anthony Gresham MD.      FL Video Swallow Single Contrast    Result Date: 8/28/2021  1.  Penetration with aspiration of thin liquids. 2. Please see dysphasia notes for further details.  This report was finalized on 8/28/2021 11:49 AM by Dr. Obie Fleming MD.      I have personally looked at the radiology images and read the final radiology report.    Assessment & Plan    Status post CVA in the left MCA pontine distribution with right-sided hemiparesis--requiring anywhere from it minimum to moderate to maximum assistance for up with bed mobility; maximum assistance for bed to chair and chair to bed independence; moderate assistance for sit to stand and stand to sit independence.  Ambulated 10 feet x 4 with platform walker with 2 person maximum assistance.  Requiring maximum assistance for lower body dressing.  Continue to work diligently on strengthening exercises and ADL management.    Diabetes mellitus continue current treatment.  Reasonably well-controlled    Depression--continue Zoloft 100 mg daily    Hypertension--well-controlled continue current management    Hypothyroidism continue Synthroid        VTE Prophylaxis:   Mechanical Order History:      Ordered        08/27/21 1125  Maintain Sequential Compression Device  Continuous         08/27/21 1125  Place Sequential Compression Device  Once                 Pharmalogical Order History:      Ordered     Dose Route Frequency Stop    08/27/21 1125  apixaban (ELIQUIS) tablet 5 mg      5 mg PO Every 12 Hours Scheduled --                    Garrett Sneed MD  [facility] Hospitalist  09/23/21  13:47 EDT

## 2021-09-23 NOTE — SIGNIFICANT NOTE
09/23/21 1100   Plan   Plan Contacted Lakeshia Vallecillo [facility] [phone] per preference per Flora with referral, discharge on 9-24-21 and orders for PT 2-3 x wk x 8 wks for strengthening, endurance, gait training, transfer training, balance, therapeutic exercise, bed mobility, neuro re-education, home safety, OT 2-3 x wk x 8 wks for ADL re-training, home safety, functional mobility, neuro re-education, nursing evaluation, medication education, post-stroke education and incontinence supplies.  Faxed face sheet, H&P, HH referral with orders, PT/OT notes, MD progress note, and DWO for incontinence supplies to [phone].  Flora's says the type of Medicaid pt has will determine coverage for incontinence supplies.  HH to be contacted at discharge.

## 2021-09-23 NOTE — SIGNIFICANT NOTE
09/23/21 1340   Plan   Plan SS informed pt about Lakeshia Vallec[facility] to provide services after she has seen PCP on 9-27-21 in [place] at 9:00 am.  Explained daughter Missy is arranging transport via [employer] W/C van for PCP appointment.  Reviewed arrangements with Giuliana in [place] for DME.  Informed pt SS will arrange W/C van transport at discharge.  Pt agreeable to plans.   Patient/Family in Agreement with Plan yes

## 2021-09-23 NOTE — SIGNIFICANT NOTE
09/23/21 1020   Plan   Plan Pt walked 20 ft x 2 this am per PTA, therefore, could benefit from RW with right platform attachment.  Contacted [facility]iana [phone] per rotation per Jeramy about discharge on 9-24-21 and order for 18 inch lightweight W/C with elevating leg rests, anti-tippers, basic cushion to be delivered to rehab today or in am.  Asked Jeramy about insurance paying for RW with right platform attachment and he says they will not pay for W/C and RW.  Pt would have to purchase RW with right platform attachment.  Informed Jeramy pt lives in [facility] and will be going home via W/C accessible van.  SS contacted [facility] [phone] per Tegan with discharge on 9-24-21 and orders for hospital bed with mattress and bedside commode.  [employer] [facility] is out of hospital beds.  Contacted Jeramy with [facility] [phone] who will check on having hospital bed and call SS back.   Cost of RW is $100.61 and right platform attachment is $84.61.  Faxed DWO, face sheet, H&P, PT/OT notes, and MD progress note to Giuliana [phone] and [phone].

## 2021-09-23 NOTE — SIGNIFICANT NOTE
09/23/21 1220   Plan   Plan SS received call from Flora with  who states pt's PCP office in [facility] is temporarily closed, therefore, BETH Shanks is seeing patients at [facility].  Flora scheduled pt an appointment with BETH Shanks on 9-27-21 at 9:00 am.

## 2021-09-23 NOTE — SIGNIFICANT NOTE
09/23/21 1100   Plan   Plan Contacted Lakeshia Vallecillo [facility] [phone] per preference per Flora with referral, discharge on 9-24-21 and orders for PT 2-3 x wk x 8 wks for strengthening, endurance, gait training, transfer training, balance, therapeutic exercise, bed mobility, neuro re-education, home safety, OT 2-3 x wk x 8 wks for ADL re-training, home safety, functional mobility, neuro re-education, nursing evaluation, medication education, post-stroke education and incontinence supplies.  Faxed face sheet, H&P,  referral with orders, PT/OT notes and MD progress note to  [phone].   to be contacted at discharge.

## 2021-09-23 NOTE — SIGNIFICANT NOTE
09/23/21 1310   Plan   Plan Spoke to daughter Missy [phone] about pt's PCP appointment on 9-27-21 at 9:00 am with BETH Shanks in [place].  Daughter will schedule transportation via W/C accessible van with [employer] to PCP appointment; provided phone number.   Patient/Family in Agreement with Plan yes

## 2021-09-23 NOTE — DISCHARGE INSTR - OTHER ORDERS
[facility]ia [facility] [phone] for PT/OT 2-3 x wk x 8 wks and nursing evaluation.    [facility] [phone] for 18 inch lightweight wheelchair with elevating leg rests, anti-tippers, basic cushion.    [phone] with mattress and bedside commode.

## 2021-09-23 NOTE — SIGNIFICANT NOTE
09/23/21 1816   Plan   Plan Spoke to Flora with Lakeshia Vallecillo [facility] who says pt will need to see PCP on 9-27-21 so they can start services on 9-28-21.  Pt has not seen BETH Shanks since March 2021.  Left message for Lakeshia Vallecillo [facility] [phone] about scheduling pt a follow-up appointment on 9-27-21.

## 2021-09-23 NOTE — THERAPY TREATMENT NOTE
Inpatient Rehabilitation - Occupational Therapy Treatment Note     [place]     Patient Name: Joycelyn Brown  : 1940  MRN: [id number]    Today's Date: 2021                 Admit Date: 2021         ICD-10-CM ICD-9-CM   1. CVA (cerebral vascular accident) (CMS/[facility])  I63.9 434.91       Patient Active Problem List   Diagnosis   • CVA (cerebral vascular accident) (CMS/[facility])       History reviewed. No pertinent past medical history.    No past surgical history on file.             IRF OT ASSESSMENT FLOWSHEET (last 12 hours)      IRF OT Evaluation and Treatment     Row Name 21 1400          OT Time and Intention    Document Type  daily treatment  -     Mode of Treatment  individual therapy;occupational therapy  -     Patient Effort  good  -     Row Name 21 1400          General Information    Patient/Family/Caregiver Comments/Observations  patient agreeable to therapy this am. patient tolerated well with minimal complaints.  -     Existing Precautions/Restrictions  fall;oxygen therapy device and L/min  -     Row Name 21 1400          Motor Skills    Motor Skills  coordination;functional endurance;neuro-muscular function;therapeutic exercise  -     Therapeutic Exercise  -- RUE ROM, grasp and release, wt bearing, AROM arm skate  -     Row Name 21 1400          Modality Intervention (Comprehensive)    Modality Treatment  NMES/FES/Bioness  -     Additional Documentation  -- right wrist and hand  -       User Key  (r) = Recorded By, (t) = Taken By, (c) = Cosigned By    Initials Name Effective Dates     Maria Guadalupe Monge, OT 21 -            Occupational Therapy Education                 Title: PT OT SLP Therapies (Done)     Topic: Occupational Therapy (Done)     Point: ADL training (Done)     Description:   Instruct learner(s) on proper safety adaptation and remediation techniques during self care or transfers.   Instruct in proper use of assistive devices.               Learning Progress Summary           Patient Acceptance, E,TB, VU by DG at 9/18/2021 2225    Acceptance, E,TB, VU by DG at 9/18/2021 0014    Acceptance, E, VU,NR by BF at 9/17/2021 1456    Acceptance, E,TB, VU by DG at 9/16/2021 2222    Acceptance, E,TB, VU by DG at 9/15/2021 2303    Acceptance, E,TB, VU by DG at 9/2/2021 2003    Acceptance, E,D, VU,NR by CJ at 9/2/2021 1458    Acceptance, E,TB, VU by DG at 9/1/2021 2256    Acceptance, E,D, VU,NR by  at 9/1/2021 1520    Acceptance, E,D, VU,NR by  at 8/31/2021 1541    Acceptance, E, VU,NR by BF at 8/30/2021 1507    Acceptance, E,TB, VU by DG at 8/28/2021 2040    Acceptance, E,D, VU,NR by AB at 8/28/2021 1410                   Point: Home exercise program (Done)     Description:   Instruct learner(s) on appropriate technique for monitoring, assisting and/or progressing therapeutic exercises/activities.              Learning Progress Summary           Patient Acceptance, E,TB, VU by DG at 9/18/2021 2225    Acceptance, E,TB, VU by DG at 9/18/2021 0014    Acceptance, E,TB, VU by DG at 9/16/2021 2222    Acceptance, E,TB, VU by DG at 9/15/2021 2303    Acceptance, E,TB, VU by DG at 9/2/2021 2003    Acceptance, E,TB, VU by DG at 9/1/2021 2256    Acceptance, E,TB, VU by DG at 8/28/2021 2040                   Point: Precautions (Done)     Description:   Instruct learner(s) on prescribed precautions during self-care and functional transfers.              Learning Progress Summary           Patient Acceptance, E,TB, VU by DG at 9/18/2021 2225    Acceptance, E,TB, VU by DG at 9/18/2021 0014    Acceptance, E, VU,NR by BF at 9/17/2021 1456    Acceptance, E,TB, VU by DG at 9/16/2021 2222    Acceptance, E,TB, VU by BEVERLY at 9/15/2021 2303    Acceptance, E,TB, VU by BEVERLY at 9/2/2021 2003    Acceptance, E,D, VU,NR by CJ at 9/2/2021 1458    Acceptance, E,TB, VU by BEVERLY at 9/1/2021 2256    Acceptance, E,D, VU,NR by TONJA at 9/1/2021 1520    Acceptance, E,D, VU,NR by TONJA at 8/31/2021  1541    Acceptance, E, VU,NR by  at 8/30/2021 1507    Acceptance, E,TB, VU by  at 8/28/2021 2040    Acceptance, E,D, VU,NR by AB at 8/28/2021 1410                               User Key     Initials Effective Dates Name Provider Type Discipline    AB 06/16/21 -  Yazmin Chaves, OT Occupational Therapist OT    DG 06/16/21 -  Melissa Swift RN Registered Nurse Nurse     06/16/21 -  Debbie Mehta, OT Occupational Therapist OT     06/16/21 -  Lucrecia Andrews, OT Occupational Therapist OT                    OT Recommendation and Plan                         Time Calculation:     Time Calculation- OT     Row Name 09/23/21 1453             Time Calculation- OT    OT Start Time  1000  -      OT Stop Time  1130  -      OT Time Calculation (min)  90 min  -        User Key  (r) = Recorded By, (t) = Taken By, (c) = Cosigned By    Initials Name Provider Type     Maria Guadalupe Monge, OT Occupational Therapist        Therapy Charges for Today     Code Description Service Date Service Provider Modifiers Qty    70744302784 HC OT SELF CARE/MGMT/TRAIN EA 15 MIN 9/22/2021 Maria Guadalupe Monge OT GO, KX 2    02412038160 HC OT NEUROMUSC RE EDUCATION EA 15 MIN 9/22/2021 Maria Guadalupe Monge OT GO, KX 3    14477843606 HC OT ELEC STIM EA-PER 15 MIN 9/22/2021 Maria Guadalupe Monge, OT GO, KX 1    45423648557 HC OT THERAPEUTIC ACT EA 15 MIN 9/23/2021 Maria Guadalupe Monge OT GO, KX 2    32239863077 HC OT NEUROMUSC RE EDUCATION EA 15 MIN 9/23/2021 Maria Guadalupe Monge OT GO, KX 4                   Maria Guadalupe Monge OT  9/23/2021

## 2021-09-23 NOTE — THERAPY TREATMENT NOTE
Inpatient Rehabilitation - Physical Therapy Treatment Note        Tre     Patient Name: Joycelyn Brown  : 1940  MRN: [id number]    Today's Date: 2021                    Admit Date: 2021      Visit Dx:     ICD-10-CM ICD-9-CM   1. CVA (cerebral vascular accident) (CMS/HCC)  I63.9 434.91       Patient Active Problem List   Diagnosis   • CVA (cerebral vascular accident) (CMS/[facility])       History reviewed. No pertinent past medical history.    No past surgical history on file.       PT ASSESSMENT (last 12 hours)      IRF PT Evaluation and Treatment     Row Name 21 1518          PT Time and Intention    Document Type  daily treatment  -RF     Mode of Treatment  physical therapy;individual therapy  -RF     Patient/Family/Caregiver Comments/Observations  No significant c/o noted this date.   -RF     Row Name 21 1518          General Information    Patient Profile Reviewed  yes  -RF     Existing Precautions/Restrictions  fall;oxygen therapy device and L/min R HP; no 02 utilized this date  -RF     Row Name 21 1518          Cognition/Psychosocial    Affect/Mental Status (Cognitive)  WFL  -RF     Follows Commands (Cognition)  WFL  -RF     Row Name 21 1518          Pain Scale: FACES Pre/Post-Treatment    Pain: FACES Scale, Pretreatment  0-->no hurt  -RF     Posttreatment Pain Rating  0-->no hurt  -RF     Row Name 21 1518          Bed Mobility    Supine-Sit [place] (Bed Mobility)  maximum assist (25% patient effort);2 person assist  -RF     Sit-Supine [place] (Bed Mobility)  maximum assist (25% patient effort);2 person assist  -RF     Assistive Device (Bed Mobility)  bed rails  -RF     Row Name 21 1518          Transfer Assessment/Treatment    Comment (Transfers)  Transfer training performed; stand>pivot bed<>chair  -RF     Row Name 21 1518          Transfers    Bed-Chair [place] (Transfers)  maximum assist (25% patient effort);moderate assist (50%  patient effort);2 person assist  -RF     Chair-Bed [place] (Transfers)  maximum assist (25% patient effort);moderate assist (50% patient effort);2 person assist  -RF     Assistive Device (Bed-Chair Transfers)  wheelchair  -RF     Sit-Stand [place] (Transfers)  moderate assist (50% patient effort);2 person assist  -RF     Stand-Sit [place] (Transfers)  moderate assist (50% patient effort);2 person assist  -RF     Row Name 09/23/21 1518          Chair-Bed Transfer    Assistive Device (Chair-Bed Transfers)  wheelchair  -RF     Row Name 09/23/21 1518          Sit-Stand Transfer    Assistive Device (Sit-Stand Transfers)  other (see comments) parallel bars  -RF     Row Name 09/23/21 1518          Stand-Sit Transfer    Assistive Device (Stand-Sit Transfers)  other (see comments) parallel bars  -RF     Row Name 09/23/21 1518          Stand Pivot/Stand Step Transfer    Stand Pivot/Stand Step [place] (Transfers)  moderate assist (50% patient effort);maximum assist (25% patient effort);2 person assist  -RF     Assistive Device (Stand Pivot Stand Step Transfer)  wheelchair;other (see comments) bed rail  -RF     Row Name 09/23/21 1518          Gait/Stairs (Locomotion)    Gait/Stairs Locomotion  gait/ambulation assistive device  -RF     [place] Level (Gait)  2 person assist;minimum assist (75% patient effort);moderate assist (50% patient effort)  -RF     Assistive Device (Gait)  walker, platform  -RF     Distance in Feet (Gait)  20X2  -RF     Pattern (Gait)  step-to;step-through  -RF     Bilateral Gait Deviations  knee buckling, right side  -RF     Right Sided Gait Deviations  knee buckling, right side;weight shift ability decreased  -RF     Comment (Gait/Stairs)  Significant improvements noted in ambulation quality this date. Minimal tactile cuing or manual assistance required. Pt able to activate quad/glut and weightshift with cuing. Reciprocal pattern noted intermittently. AFO used on RLE.   -RF      Row Name 09/23/21 1518          Safety Issues, Functional Mobility    Impairments Affecting Function (Mobility)  balance;endurance/activity tolerance;muscle tone abnormal;postural/trunk control;strength  -RF     Row Name 09/23/21 1518          Hip (Therapeutic Exercise)    Hip Strengthening (Therapeutic Exercise)  bilateral;flexion;extension;aBduction;aDduction;marching while seated;sitting;2 sets;10 repetitions  -RF     Row Name 09/23/21 1518          Knee (Therapeutic Exercise)    Knee Strengthening (Therapeutic Exercise)  bilateral;flexion;extension;marching while seated;LAQ (long arc quad);hamstring curls;sitting;2 sets;10 repetitions  -RF     Row Name 09/23/21 1518          Ankle (Therapeutic Exercise)    Ankle Strengthening (Therapeutic Exercise)  bilateral;dorsiflexion;plantarflexion;sitting;2 sets;10 repetitions  -RF     Row Name 09/23/21 1518          [place] Electrical Stimulation Treatment    Location 1 (Russian E-Stim Treatment)  lower extremity treatment area;other (see comments) R ant tib  -RF     Indications (Russian E-Stim Treatment)  enhance muscle contraction  -RF     Treatment Details (Russian Electrical Stimulation Treatment)  15 mins, 10/20, 40mA  -RF     Response to Treatment (Russian E-Stim Treatment)  muscle contraction enhanced  -RF     Comment ([place] E-Stim Treatment)  Pt cued for DF with on cycle  -RF     Row Name 09/23/21 1518          Bed Mobility Goal 1 (PT-IRF)    Activity/Assistive Device (Bed Mobility Goal 1, PT-IRF)  bed mobility activities, all  -RF     [place] Level (Bed Mobility Goal 1, PT-IRF)  standby assist  -RF     Time Frame (Bed Mobility Goal 1, PT-IRF)  long-term goal (LTG)  -RF     Progress/Outcomes (Bed Mobility Goal 1, PT-IRF)  goal ongoing  -RF     Row Name 09/23/21 1518          Transfer Goal 1 (PT-IRF)    Activity/Assistive Device (Transfer Goal 1, PT-IRF)  all transfers  -RF     [place] Level (Transfer Goal 1, PT-IRF)  minimum assist (75% or more patient  effort)  -RF     Time Frame (Transfer Goal 1, PT-IRF)  by discharge  -RF     Progress/Outcomes (Transfer Goal 1, PT-IRF)  goal ongoing  -RF     Row Name 09/23/21 1518          Gait/Walking Locomotion Goal 1 (PT-IRF)    Activity/Assistive Device (Gait/Walking Locomotion Goal 1, PT-IRF)  gait (walking locomotion);assistive device use  -RF     Gait/Walking Locomotion Distance Goal 1 (PT-IRF)  5  -RF     Early Level (Gait/Walking Locomotion Goal 1, PT-IRF)  minimum assist (75% or more patient effort)  -RF     Time Frame (Gait/Walking Locomotion Goal 1, PT-IRF)  long-term goal (LTG)  -RF     Progress/Outcomes (Gait/Walking Locomotion Goal 1, PT-IRF)  goal ongoing  -RF     Row Name 09/23/21 1518          Stairs Goal 1 (PT-IRF)    Activity/Assistive Device (Stairs Goal 1, PT-IRF)  stairs, all skills;ascending stairs;descending stairs;using handrail, left  -RF     Number of Stairs (Stairs Goal 1, PT-IRF)  5  -RF     Early Level (Stairs Goal 1, PT-IRF)  minimum assist (75% or more patient effort);moderate assist (50-74% patient effort)  -RF     Time Frame (Stairs Goal 1, PT-IRF)  long-term goal (LTG)  -RF     Progress/Outcomes (Stairs Goal 1, PT-IRF)  goal ongoing  -RF     Row Name 09/23/21 1518          Positioning and Restraints    Pre-Treatment Position  sitting in chair/recliner BID  -RF     In Bed  supine;call light within reach;encouraged to call for assist PM  -RF     In Wheelchair  sitting;call light within reach;encouraged to call for assist AM  -RF     Row Name 09/23/21 1518          Vital Signs    Pre SpO2 (%)  94  -RF     O2 Delivery Pre Treatment  room air  -RF     Intra SpO2 (%)  95  -RF     O2 Delivery Intra Treatment  room air  -RF     Post SpO2 (%)  95  -RF     O2 Delivery Post Treatment  room air  -RF     Row Name 09/23/21 1518          Therapy Assessment/Plan (PT)    Rehab Potential/Prognosis (PT)  adequate, monitor progress closely  -RF     Frequency of Treatment (PT)  5 times per week  -RF      Estimated Duration of Therapy (PT)  2 weeks  -RF     Problem List (PT)  problems related to;balance;hemiparesis/hemiplegia;mobility;strength  -RF     Row Name 09/23/21 1518          Daily Progress Summary (PT)    Functional Goal Overall Progress (PT)  progressing toward functional goals as expected  -RF     Daily Progress Summary (PT)  Improvements noted in gait training and functional mobility this date. Pt continually requires increased assistance for transferring. Improvements in RLE strength noted as well.   -RF     Impairments Still Limiting Function (PT)  sensation decreased;strength decreased;impaired balance;coordination impaired;impaired functional activity tolerance;motor control impaired;postural control impaired;pain  -RF     Recommendations (PT)  Continue per current POC.   -RF       User Key  (r) = Recorded By, (t) = Taken By, (c) = Cosigned By    Initials Name Provider Type    RF Malinda Alicea, [facility] Physical Therapy Assistant           Physical Therapy Education                 Title: PT OT SLP Therapies (Done)     Topic: Physical Therapy (Done)     Point: Mobility training (Done)     Learning Progress Summary           Patient Acceptance, E,TB, VU by RF at 9/23/2021 1526    Acceptance, E,TB, VU by RF at 9/22/2021 1515    Acceptance, E,TB, VU by RF at 9/21/2021 1547    Acceptance, E,TB, VU by RF at 9/20/2021 1558    Acceptance, E,TB, VU by DG at 9/18/2021 2225    Acceptance, E,TB, VU by DG at 9/18/2021 0014    Acceptance, E,TB, VU by DG at 9/16/2021 2222    Acceptance, E,TB, VU by DG at 9/15/2021 2303    Acceptance, E,TB, VU by RF at 9/15/2021 1603    Acceptance, E,TB, VU by RF at 9/14/2021 1614    Acceptance, E, VU,NR by LB at 9/13/2021 1121    Acceptance, E,TB, VU by RF at 9/10/2021 1556    Acceptance, E,TB, VU by RF at 9/9/2021 1551    Acceptance, E,TB, VU by RF at 9/8/2021 1602    Acceptance, E,TB, VU by RF at 9/7/2021 1605    Acceptance, E,TB, VU by RF at 9/6/2021 1557    Acceptance, E,TB,  VU by RF at 9/3/2021 1532    Acceptance, E,TB, VU by DG at 9/2/2021 2003    Acceptance, E,TB, VU by RF at 9/2/2021 1610    Acceptance, E,TB, VU by DG at 9/1/2021 2256    Acceptance, E,TB, VU by RF at 9/1/2021 1548    Acceptance, E,TB, VU by RF at 8/31/2021 1423    Acceptance, E,TB, VU by RF at 8/30/2021 1548                   Point: Home exercise program (Done)     Learning Progress Summary           Patient Acceptance, E,TB, VU by RF at 9/23/2021 1526    Acceptance, E,TB, VU by RF at 9/22/2021 1515    Acceptance, E,TB, VU by RF at 9/21/2021 1547    Acceptance, E,TB, VU by RF at 9/20/2021 1558    Acceptance, E,TB, VU by DG at 9/18/2021 2225    Acceptance, E,TB, VU by DG at 9/18/2021 0014    Acceptance, E,TB, VU by DG at 9/16/2021 2222    Acceptance, E,TB, VU by DG at 9/15/2021 2303    Acceptance, E,TB, VU by RF at 9/15/2021 1603    Acceptance, E,TB, VU by RF at 9/14/2021 1614    Acceptance, E, VU,NR by LB at 9/13/2021 1121    Acceptance, E,TB, VU by RF at 9/10/2021 1556    Acceptance, E,TB, VU by RF at 9/9/2021 1551    Acceptance, E,TB, VU by RF at 9/8/2021 1602    Acceptance, E,TB, VU by RF at 9/7/2021 1605    Acceptance, E,TB, VU by RF at 9/6/2021 1557    Acceptance, E,TB, VU by RF at 9/3/2021 1532    Acceptance, E,TB, VU by DG at 9/2/2021 2003    Acceptance, E,TB, VU by RF at 9/2/2021 1610    Acceptance, E,TB, VU by DG at 9/1/2021 2256    Acceptance, E,TB, VU by RF at 9/1/2021 1548    Acceptance, E,TB, VU by RF at 8/31/2021 1423    Acceptance, E,TB, VU by RF at 8/30/2021 1548                   Point: Body mechanics (Done)     Learning Progress Summary           Patient Acceptance, E,TB, VU by RF at 9/23/2021 1526    Acceptance, E,TB, VU by RF at 9/22/2021 1515    Acceptance, E,TB, VU by RF at 9/21/2021 1547    Acceptance, E,TB, VU by RF at 9/20/2021 1558    Acceptance, E,TB, VU by DG at 9/18/2021 2225    Acceptance, E,TB, VU by DG at 9/18/2021 0014    Acceptance, E,TB, VU by DG at 9/16/2021 2222    Acceptance,  E,TB, VU by DG at 9/15/2021 2303    Acceptance, E,TB, VU by RF at 9/15/2021 1603    Acceptance, E,TB, VU by RF at 9/14/2021 1614    Acceptance, E, VU,NR by LB at 9/13/2021 1121    Acceptance, E,TB, VU by RF at 9/10/2021 1556    Acceptance, E,TB, VU by RF at 9/9/2021 1551    Acceptance, E,TB, VU by RF at 9/8/2021 1602    Acceptance, E,TB, VU by RF at 9/7/2021 1605    Acceptance, E,TB, VU by RF at 9/6/2021 1557    Acceptance, E,TB, VU by RF at 9/3/2021 1532    Acceptance, E,TB, VU by DG at 9/2/2021 2003    Acceptance, E,TB, VU by RF at 9/2/2021 1610    Acceptance, E,TB, VU by DG at 9/1/2021 2256    Acceptance, E,TB, VU by RF at 9/1/2021 1548    Acceptance, E,TB, VU by RF at 8/31/2021 1423    Acceptance, E,TB, VU by RF at 8/30/2021 1548                   Point: Precautions (Done)     Learning Progress Summary           Patient Acceptance, E,TB, VU by RF at 9/23/2021 1526    Acceptance, E,TB, VU by RF at 9/22/2021 1515    Acceptance, E,TB, VU by RF at 9/21/2021 1547    Acceptance, E,TB, VU by RF at 9/20/2021 1558    Acceptance, E,TB, VU by DG at 9/18/2021 2225    Acceptance, E,TB, VU by DG at 9/18/2021 0014    Acceptance, E,TB, VU by DG at 9/16/2021 2222    Acceptance, E,TB, VU by DG at 9/15/2021 2303    Acceptance, E,TB, VU by RF at 9/15/2021 1603    Acceptance, E,TB, VU by RF at 9/14/2021 1614    Acceptance, E, VU,NR by LB at 9/13/2021 1121    Acceptance, E,TB, VU by RF at 9/10/2021 1556    Acceptance, E,TB, VU by RF at 9/9/2021 1551    Acceptance, E,TB, VU by RF at 9/8/2021 1602    Acceptance, E,TB, VU by RF at 9/7/2021 1605    Acceptance, E,TB, VU by RF at 9/6/2021 1557    Acceptance, E,TB, VU by RF at 9/3/2021 1532    Acceptance, E,TB, VU by DG at 9/2/2021 2003    Acceptance, E,TB, VU by RF at 9/2/2021 1610    Acceptance, E,TB, VU by DG at 9/1/2021 2256    Acceptance, E,TB, VU by RF at 9/1/2021 1548    Acceptance, E,TB, VU by RF at 8/31/2021 1423    Acceptance, E,TB, VU by RF at 8/30/2021 1548                                User Key     Initials Effective Dates Name Provider Type Discipline    DG 06/16/21 -  Melissa Swift RN Registered Nurse Nurse     06/16/21 -  Anju Reis, PT Physical Therapist PT    RF 06/16/21 -  Malinda Alicea PTA Physical Therapy Assistant PT                PT Recommendation and Plan    Frequency of Treatment (PT): 5 times per week     Daily Progress Summary (PT)  Functional Goal Overall Progress (PT): progressing toward functional goals as expected  Daily Progress Summary (PT): Improvements noted in gait training and functional mobility this date. Pt continually requires increased assistance for transferring. Improvements in RLE strength noted as well.   Impairments Still Limiting Function (PT): sensation decreased, strength decreased, impaired balance, coordination impaired, impaired functional activity tolerance, motor control impaired, postural control impaired, pain  Recommendations (PT): Continue per current POC.                Time Calculation:     PT Charges     Row Name 09/23/21 1527 09/23/21 1526          Time Calculation    Start Time  1245  -RF  0915  -RF     Stop Time  1330  -RF  1000  -RF     Time Calculation (min)  45 min  -RF  45 min  -RF     PT Received On  09/23/21  -RF  09/23/21  -RF     PT - Next Appointment  09/24/21  -RF  09/23/21  -RF     PT Goal Re-Cert Due Date  10/01/21  -RF  10/01/21  -RF        Time Calculation- PT    Total Timed Code Minutes- PT  45 minute(s)  -RF  45 minute(s)  -RF       User Key  (r) = Recorded By, (t) = Taken By, (c) = Cosigned By    Initials Name Provider Type    RF Malinda Alicea PTA Physical Therapy Assistant          Therapy Charges for Today     Code Description Service Date Service Provider Modifiers Qty    07771392474 HC GAIT TRAINING EA 15 MIN 9/22/2021 Malinda Alicea, PTA GP, KX 2    23129477949 HC PT NEUROMUSC RE EDUCATION EA 15 MIN 9/22/2021 Malinda Alicea, PTA GP, KX 1    97849185884 HC PT THER PROC EA 15 MIN  9/22/2021 Malinda Alicea, PTA GP, KX 2    16713752803 HC PT THERAPEUTIC ACT EA 15 MIN 9/22/2021 Malinda Alicea, PTA GP, KX 1    07250476573 HC GAIT TRAINING EA 15 MIN 9/23/2021 Malinda Alicea, PTA GP, KX 3    [id number] HC PT NEUROMUSC RE EDUCATION EA 15 MIN 9/23/2021 Malinda Alicea, PTA GP, KX 1    [id number]  PT THER PROC EA 15 MIN 9/23/2021 Malinda Alicea, PTA GP, KX 2                   Malinda Alicea, PTA  9/23/2021

## 2021-09-24 VITALS
RESPIRATION RATE: 20 BRPM | BODY MASS INDEX: 28.25 KG/M2 | SYSTOLIC BLOOD PRESSURE: 146 MMHG | WEIGHT: 180 LBS | TEMPERATURE: 98.2 F | OXYGEN SATURATION: 95 % | HEIGHT: 67 IN | DIASTOLIC BLOOD PRESSURE: 62 MMHG | HEART RATE: 66 BPM

## 2021-09-24 LAB
GLUCOSE BLDC GLUCOMTR-MCNC: 146 MG/DL (ref 70–130)
GLUCOSE BLDC GLUCOMTR-MCNC: 175 MG/DL (ref 70–130)

## 2021-09-24 PROCEDURE — 63710000001 INSULIN DETEMIR PER 5 UNITS: Performed by: INTERNAL MEDICINE

## 2021-09-24 PROCEDURE — 99238 HOSP IP/OBS DSCHRG MGMT 30/<: CPT | Performed by: FAMILY MEDICINE

## 2021-09-24 PROCEDURE — 97535 SELF CARE MNGMENT TRAINING: CPT | Performed by: OCCUPATIONAL THERAPIST

## 2021-09-24 PROCEDURE — 82962 GLUCOSE BLOOD TEST: CPT

## 2021-09-24 PROCEDURE — 63710000001 INSULIN ASPART PER 5 UNITS: Performed by: INTERNAL MEDICINE

## 2021-09-24 PROCEDURE — 97530 THERAPEUTIC ACTIVITIES: CPT

## 2021-09-24 RX ORDER — SERTRALINE HYDROCHLORIDE 100 MG/1
100 TABLET, FILM COATED ORAL DAILY
Qty: 30 TABLET | Refills: 0 | Status: SHIPPED | OUTPATIENT
Start: 2021-09-25 | End: 2021-10-25

## 2021-09-24 RX ORDER — AMLODIPINE BESYLATE 10 MG/1
10 TABLET ORAL 2 TIMES DAILY
Qty: 60 TABLET | Refills: 0 | Status: SHIPPED | OUTPATIENT
Start: 2021-09-24 | End: 2021-10-24

## 2021-09-24 RX ORDER — POLYETHYLENE GLYCOL 3350 17 G/17G
17 POWDER, FOR SOLUTION ORAL DAILY PRN
Qty: 510 G | Refills: 0 | Status: SHIPPED | OUTPATIENT
Start: 2021-09-24 | End: 2021-10-24

## 2021-09-24 RX ORDER — CHLORTHALIDONE 25 MG/1
25 TABLET ORAL DAILY
Qty: 30 TABLET | Refills: 0 | Status: SHIPPED | OUTPATIENT
Start: 2021-09-25 | End: 2021-10-25

## 2021-09-24 RX ORDER — PANTOPRAZOLE SODIUM 40 MG/1
40 TABLET, DELAYED RELEASE ORAL
Qty: 30 TABLET | Refills: 0 | Status: SHIPPED | OUTPATIENT
Start: 2021-09-25 | End: 2021-10-25

## 2021-09-24 RX ORDER — AMOXICILLIN 250 MG
1 CAPSULE ORAL 2 TIMES DAILY
Qty: 60 TABLET | Refills: 0 | Status: SHIPPED | OUTPATIENT
Start: 2021-09-24 | End: 2021-10-24

## 2021-09-24 RX ORDER — KETOTIFEN FUMARATE 0.35 MG/ML
1 SOLUTION/ DROPS OPHTHALMIC 2 TIMES DAILY
Qty: 10 ML | Refills: 0 | Status: SHIPPED | OUTPATIENT
Start: 2021-09-24

## 2021-09-24 RX ORDER — CARVEDILOL 12.5 MG/1
12.5 TABLET ORAL 2 TIMES DAILY WITH MEALS
Qty: 60 TABLET | Refills: 0 | Status: SHIPPED | OUTPATIENT
Start: 2021-09-24 | End: 2021-10-24

## 2021-09-24 RX ORDER — BISACODYL 5 MG/1
10 TABLET, DELAYED RELEASE ORAL DAILY PRN
Qty: 15 TABLET | Refills: 0 | Status: SHIPPED | OUTPATIENT
Start: 2021-09-24 | End: 2021-10-09

## 2021-09-24 RX ORDER — ASCORBIC ACID 500 MG
500 TABLET ORAL DAILY
Qty: 30 TABLET | Refills: 0 | Status: SHIPPED | OUTPATIENT
Start: 2021-09-25 | End: 2021-10-25

## 2021-09-24 RX ORDER — BLOOD-GLUCOSE METER
1 EACH MISCELLANEOUS 3 TIMES DAILY
Qty: 1 EACH | Refills: 0 | Status: SHIPPED | OUTPATIENT
Start: 2021-09-24

## 2021-09-24 RX ORDER — ATORVASTATIN CALCIUM 80 MG/1
80 TABLET, FILM COATED ORAL NIGHTLY
Qty: 30 TABLET | Refills: 0 | Status: SHIPPED | OUTPATIENT
Start: 2021-09-24 | End: 2021-10-24

## 2021-09-24 RX ORDER — PHENOL 1.4 %
10 AEROSOL, SPRAY (ML) MUCOUS MEMBRANE
Qty: 30 TABLET | Refills: 0 | Status: SHIPPED | OUTPATIENT
Start: 2021-09-24 | End: 2021-10-24

## 2021-09-24 RX ORDER — INSULIN LISPRO 100 [IU]/ML
0-7 INJECTION, SOLUTION INTRAVENOUS; SUBCUTANEOUS
Qty: 15 ML | Refills: 12 | Status: SHIPPED | OUTPATIENT
Start: 2021-09-24

## 2021-09-24 RX ORDER — ONDANSETRON 4 MG/1
4 TABLET, FILM COATED ORAL EVERY 6 HOURS PRN
Qty: 12 TABLET | Refills: 0 | Status: SHIPPED | OUTPATIENT
Start: 2021-09-24 | End: 2021-10-24

## 2021-09-24 RX ORDER — LEVOTHYROXINE SODIUM 0.05 MG/1
50 TABLET ORAL DAILY
Qty: 30 TABLET | Refills: 0 | Status: SHIPPED | OUTPATIENT
Start: 2021-09-24 | End: 2021-10-24

## 2021-09-24 RX ORDER — CARBOXYMETHYLCELLULOSE SODIUM 5 MG/ML
2 SOLUTION/ DROPS OPHTHALMIC 3 TIMES DAILY PRN
Qty: 50 EACH | Refills: 12 | Status: SHIPPED | OUTPATIENT
Start: 2021-09-24

## 2021-09-24 RX ORDER — LANCETS
1 EACH MISCELLANEOUS 3 TIMES DAILY
Qty: 100 EACH | Refills: 12 | Status: SHIPPED | OUTPATIENT
Start: 2021-09-24 | End: 2022-09-24

## 2021-09-24 RX ORDER — MULTIPLE VITAMINS W/ MINERALS TAB 9MG-400MCG
1 TAB ORAL DAILY
Qty: 30 TABLET | Refills: 0 | Status: SHIPPED | OUTPATIENT
Start: 2021-09-25 | End: 2021-10-25

## 2021-09-24 RX ADMIN — LEVOTHYROXINE SODIUM 50 MCG: 50 TABLET ORAL at 05:05

## 2021-09-24 RX ADMIN — SERTRALINE 100 MG: 50 TABLET, FILM COATED ORAL at 08:46

## 2021-09-24 RX ADMIN — CHLORTHALIDONE 25 MG: 50 TABLET ORAL at 08:46

## 2021-09-24 RX ADMIN — DOCUSATE SODIUM 50 MG AND SENNOSIDES 8.6 MG 1 TABLET: 8.6; 5 TABLET, FILM COATED ORAL at 08:46

## 2021-09-24 RX ADMIN — CARVEDILOL 12.5 MG: 6.25 TABLET, FILM COATED ORAL at 08:46

## 2021-09-24 RX ADMIN — INSULIN DETEMIR 20 UNITS: 100 INJECTION, SOLUTION SUBCUTANEOUS at 09:39

## 2021-09-24 RX ADMIN — PANTOPRAZOLE SODIUM 40 MG: 40 TABLET, DELAYED RELEASE ORAL at 05:05

## 2021-09-24 RX ADMIN — Medication 1000 MCG: at 08:46

## 2021-09-24 RX ADMIN — KETOTIFEN FUMARATE 1 DROP: 0.35 SOLUTION/ DROPS OPHTHALMIC at 09:39

## 2021-09-24 RX ADMIN — OXYCODONE HYDROCHLORIDE AND ACETAMINOPHEN 500 MG: 500 TABLET ORAL at 08:46

## 2021-09-24 RX ADMIN — INSULIN ASPART 2 UNITS: 100 INJECTION, SOLUTION INTRAVENOUS; SUBCUTANEOUS at 11:08

## 2021-09-24 RX ADMIN — APIXABAN 5 MG: 5 TABLET, FILM COATED ORAL at 08:46

## 2021-09-24 RX ADMIN — Medication 1 TABLET: at 08:46

## 2021-09-24 RX ADMIN — LINAGLIPTIN 5 MG: 5 TABLET, FILM COATED ORAL at 08:46

## 2021-09-24 RX ADMIN — AMLODIPINE BESYLATE 10 MG: 10 TABLET ORAL at 08:46

## 2021-09-24 NOTE — PROGRESS NOTES
Patient Assessment Instrument  Quality Indicators - Discharge    Section GG. Self-Care Performance      Section GG. Mobility Performance      Section J. Health Conditions Discharge      Section M. Skin Conditions Discharge  Unhealed Pressure Ulcer(s)/Injurie(s) at Stage 1 or Higher:  No    . Current Number of Unhealed Pressure Ulcers      Section N. Medication    Signed by: Babs Medrano Nurse

## 2021-09-24 NOTE — PLAN OF CARE
Goal Outcome Evaluation:              Outcome Summary: No acute changes, no complaints or request at this time, Will continue to monitor.

## 2021-09-24 NOTE — DISCHARGE SUMMARY
[facility] HOSPITALISTS DISCHARGE SUMMARY    Patient Identification:  Name:  Joycelyn Brown  Age:  81 y.o.  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 female presented to [facility] acute inpatient physical rehab from in transfer from the [facility].  Patient 80-year-old female who was admitted initially with right-sided weakness, aphasia and was found to have a left MCA pontine CVA but was out of the window for TPA.  Patient had a dense right-sided hemiparesis as well as dysarthria, facial droop.  Patient did develop acute respiratory failure during her admission secondary to aspiration pneumonia and UTI.  Patient did have difficulty with controlling her pressure initially while hospitalized and was also treated for uncontrolled hyperglycemia.  Patient progressed and was medically stable and thought to be an excellent candidate for inpatient physical rehab and was admitted to the service    At the time of admission patient required moderate assistance for up with bed mobility; moderate to maximum assistance for help with transfers; unable to walk.  Patient also had mild static sitting impairments severe static standing impairment.  Was noted to have 1 out of 5 strength in right upper extremity when she arrived.  4 out of 5 strength in the  left upper extremity.  Patient's ADLs was total to max assistance for bathing; total assistance for upper body dressing; total assistance for lower body dressing; total to max assistance for grooming; total assistance for toileting.  Speech therapy also evaluated her and recommended mechanical soft solids, chopped meats, nectar thickened liquids.  Patient required meds whole in purée and nectars.    At the time of discharge, patient still requiring maximum assistance for for bed mobility; moderate to maximum assistance for up with transfers; ambulated 20 feet x 2 with to person minimum to moderate assistance with a platform walker.  Patient is continue to work diligently towards improving motor skills.  Patient still requiring maximum assist for upper lower body dressing.  Did discuss with patient that might benefit from nursing home placement.  However her and the family decided to go home and she does have competent family members to be able to help her including nurses and the family as well as Occupational Therapy.  Will arrange for home health to help and patient is transition as well.  Patient will require hospital bed, wheelchair, bedside commode.  Patient has improved with her swallowing and is now on a regular consistency diet with thin liquids.  Will arrange for patient to follow-up with neurology at [facility]    Major depression disorder--patient has been somewhat depressed during the admission and we have titrated her Zoloft 200 mg p.o. daily.  Should follow-up with PCP    Diabetes mellitus--fair control throughout the admission.  Continue insulin therapy which includes Levemir and sliding scale coverage    UTI--resolved          VITAL SIGNS:  Temp:  [98.2 °F (36.8 °C)-98.7 °F (37.1 °C)] 98.2 °F (36.8 °C)  Heart Rate:  [65-66] 66  Resp:  [20] 20  BP: (146-149)/(57-62) 146/62  SpO2:  [95 %] 95 %  on   ;   Device (Oxygen Therapy): room air    Body mass index is 28.19 kg/m².  Wt Readings from  Last 3 Encounters:   08/27/21 81.6 kg (180 lb)   02/10/15 81.7 kg (180 lb 0.1 oz)       PHYSICAL EXAM:  Constitutional: No acute distress  HEENT: Normocephalic atraumatic  Neck: Supple  Cardiovascular: Regular rate and rhythm  Pulmonary/Chest: Clear to auscultation  Abdominal: Positive bowel sounds soft.   Musculoskeletal: No arthropathy  Neurological: Fairly dense right-sided hemiparesis  Skin: No rash  Peripheral vascular:  Genitourinary::    DISCHARGE DISPOSITION   Stable    DISCHARGE MEDICATIONS:     Discharge Medications      New Medications      Instructions Start Date   apixaban 5 MG tablet tablet  Commonly known as: ELIQUIS   5 mg, Oral, Every 12 Hours Scheduled      ascorbic acid 500 MG tablet  Commonly known as: VITAMIN C   500 mg, Oral, Daily   Start Date: September 25, 2021     atorvastatin 80 MG tablet  Commonly known as: LIPITOR   80 mg, Oral, Nightly      bisacodyl 5 MG EC tablet  Commonly known as: DULCOLAX   10 mg, Oral, Daily PRN      carboxymethylcellulose 0.5 % solution  Commonly known as: REFRESH PLUS   2 drops, Both Eyes, 3 Times Daily PRN      carvedilol 12.5 MG tablet  Commonly known as: COREG   12.5 mg, Oral, 2 Times Daily With Meals      chlorthalidone 25 MG tablet  Commonly known as: HYGROTON   25 mg, Oral, Daily   Start Date: September 25, 2021     Insulin Lispro (1 Unit Dial) 100 UNIT/ML solution pen-injector  Commonly known as: HUMALOG   0-7 Units, Subcutaneous, 3 Times Daily Before Meals      ketotifen 0.025 % ophthalmic solution  Commonly known as: ZADITOR   1 drop, Both Eyes, 2 Times Daily      multivitamin with minerals tablet tablet   1 tablet, Oral, Daily   Start Date: September 25, 2021     ondansetron 4 MG tablet  Commonly known as: ZOFRAN   4 mg, Oral, Every 6 Hours PRN      pantoprazole 40 MG EC tablet  Commonly known as: PROTONIX   40 mg, Oral, Every Early Morning   Start Date: September 25, 2021     polyethylene glycol 17 GM/SCOOP powder  Commonly known as: MIRALAX   17 g,  Oral, Daily PRN      sennosides-docusate 8.6-50 MG per tablet  Commonly known as: PERICOLACE   1 tablet, Oral, 2 Times Daily      sertraline 100 MG tablet  Commonly known as: ZOLOFT   100 mg, Oral, Daily   Start Date: September 25, 2021     Vitamin B12 TR 1000 MCG tablet controlled-release  Generic drug: Cyanocobalamin ER   1,000 mcg, Oral, Daily   Start Date: September 25, 2021        Changes to Medications      Instructions Start Date   insulin detemir 100 UNIT/ML injection  Commonly known as: LEVEMIR  What changed:   · how much to take  · when to take this   20 Units, Subcutaneous, Every 12 Hours Scheduled         Continue These Medications      Instructions Start Date   amLODIPine 10 MG tablet  Commonly known as: NORVASC   10 mg, Oral, 2 times daily      levothyroxine 50 MCG tablet  Commonly known as: SYNTHROID, LEVOTHROID   50 mcg, Oral, Daily      linagliptin 5 MG tablet tablet  Commonly known as: TRADJENTA   5 mg, Oral, Daily      Melatonin 10 MG tablet   10 mg, Oral, Every Night at Bedtime         Stop These Medications    aspirin 81 MG EC tablet     glipizide 5 MG tablet  Commonly known as: GLUCOTROL     hydrALAZINE 25 MG tablet  Commonly known as: APRESOLINE     loratadine 10 MG tablet  Commonly known as: CLARITIN     metoprolol tartrate 25 MG tablet  Commonly known as: LOPRESSOR     naproxen sodium 220 MG tablet  Commonly known as: ALEVE     omeprazole 20 MG capsule  Commonly known as: priLOSEC     rosuvastatin 20 MG tablet  Commonly known as: CRESTOR             Your medication list      START taking these medications      Instructions Last Dose Given Next Dose Due   apixaban 5 MG tablet tablet  Commonly known as: ELIQUIS      Take 1 tablet by mouth Every 12 (Twelve) Hours for 30 days. Indications: Other - full anticoagulation       ascorbic acid 500 MG tablet  Commonly known as: VITAMIN C  Start taking on: September 25, 2021      Take 1 tablet by mouth Daily for 30 days.       atorvastatin 80 MG  tablet  Commonly known as: LIPITOR      Take 1 tablet by mouth Every Night for 30 days.       bisacodyl 5 MG EC tablet  Commonly known as: DULCOLAX      Take 2 tablets by mouth Daily As Needed for Constipation for up to 15 days.       carboxymethylcellulose 0.5 % solution  Commonly known as: REFRESH PLUS      Administer 2 drops to both eyes 3 (Three) Times a Day As Needed for Dry Eyes.       carvedilol 12.5 MG tablet  Commonly known as: COREG      Take 1 tablet by mouth 2 (Two) Times a Day With Meals for 30 days.       chlorthalidone 25 MG tablet  Commonly known as: HYGROTON  Start taking on: September 25, 2021      Take 1 tablet by mouth Daily for 30 days.       cyanocobalamin 1000 MCG tablet  Commonly known as: VITAMIN B-12  Start taking on: September 25, 2021      Take 1 tablet by mouth Daily for 30 days.       insulin aspart 100 UNIT/ML injection  Commonly known as: novoLOG      Inject 0-7 Units under the skin into the appropriate area as directed 3 (Three) Times a Day Before Meals.       insulin detemir 100 UNIT/ML injection  Commonly known as: LEVEMIR  Replaces: Levemir FlexTouch 100 UNIT/ML injection      Inject 20 Units under the skin into the appropriate area as directed Every 12 (Twelve) Hours.       ketotifen 0.025 % ophthalmic solution  Commonly known as: ZADITOR      Administer 1 drop to both eyes 2 (Two) Times a Day.       multivitamin with minerals tablet tablet  Start taking on: September 25, 2021      Take 1 tablet by mouth Daily for 30 days.       ondansetron 4 MG tablet  Commonly known as: ZOFRAN      Take 1 tablet by mouth Every 6 (Six) Hours As Needed for Nausea or Vomiting for up to 30 days.       pantoprazole 40 MG EC tablet  Commonly known as: PROTONIX  Start taking on: September 25, 2021      Take 1 tablet by mouth Every Morning for 30 days.       polyethylene glycol 17 g packet  Commonly known as: MIRALAX      Take 17 g by mouth Daily As Needed (constipation) for up to 30 days.        sennosides-docusate 8.6-50 MG per tablet  Commonly known as: PERICOLACE      Take 1 tablet by mouth 2 (Two) Times a Day for 30 days.       sertraline 100 MG tablet  Commonly known as: ZOLOFT  Start taking on: September 25, 2021      Take 1 tablet by mouth Daily for 30 days.          CONTINUE taking these medications      Instructions Last Dose Given Next Dose Due   amLODIPine 10 MG tablet  Commonly known as: NORVASC      Take 1 tablet by mouth 2 (two) times a day for 30 days.       levothyroxine 50 MCG tablet  Commonly known as: SYNTHROID, LEVOTHROID      Take 1 tablet by mouth Daily for 30 days.       linagliptin 5 MG tablet tablet  Commonly known as: TRADJENTA      Take 1 tablet by mouth Daily for 30 days.       Melatonin 10 MG tablet      Take 1 tablet by mouth every night at bedtime for 30 days.          STOP taking these medications    aspirin 81 MG EC tablet        glipizide 5 MG tablet  Commonly known as: GLUCOTROL        hydrALAZINE 25 MG tablet  Commonly known as: APRESOLINE        Levemir FlexTouch 100 UNIT/ML injection  Generic drug: insulin detemir  Replaced by: insulin detemir 100 UNIT/ML injection        loratadine 10 MG tablet  Commonly known as: CLARITIN        metoprolol tartrate 25 MG tablet  Commonly known as: LOPRESSOR        naproxen sodium 220 MG tablet  Commonly known as: ALEVE        omeprazole 20 MG capsule  Commonly known as: priLOSEC        rosuvastatin 20 MG tablet  Commonly known as: CRESTOR              Where to Get Your Medications      These medications were sent to [facility]   [address], [place] [id number]    Hours: 8AM-6PM Mon-Fri Phone: [phone]   · amLODIPine 10 MG tablet  · apixaban 5 MG tablet tablet  · ascorbic acid 500 MG tablet  · atorvastatin 80 MG tablet  · bisacodyl 5 MG EC tablet  · carboxymethylcellulose 0.5 % solution  · carvedilol 12.5 MG tablet  · chlorthalidone 25 MG tablet  · cyanocobalamin 1000 MCG tablet  · insulin aspart 100 UNIT/ML  injection  · insulin detemir 100 UNIT/ML injection  · ketotifen 0.025 % ophthalmic solution  · levothyroxine 50 MCG tablet  · linagliptin 5 MG tablet tablet  · Melatonin 10 MG tablet  · multivitamin with minerals tablet tablet  · ondansetron 4 MG tablet  · pantoprazole 40 MG EC tablet  · polyethylene glycol 17 g packet  · sennosides-docusate 8.6-50 MG per tablet  · sertraline 100 MG tablet         Diet Instructions     Diet: Consistent Carbohydrate      Discharge Diet: Consistent Carbohydrate        Activity Instructions     Per PT          No future appointments.  Additional Instructions for the Follow-ups that You Need to Schedule     Ambulatory Referral to Home Health   As directed      Face to Face Visit Date: 9/24/2021    Follow-up provider for Plan of Care?: I treated the patient in an acute care facility and will not continue treatment after discharge.    Follow-up provider: GEORGINA CHIU [[id number]]    Reason/Clinical Findings: s/p cva right hemiparesis    Describe mobility limitations that make leaving home difficult: right hemiparesis    Nursing/Therapeutic Services Requested: Skilled Nursing Physical Therapy Occupational Therapy    Skilled nursing orders: Medication education Neurovascular assessments    PT orders: Gait Training Therapeutic exercise Strengthening    Weight Bearing Status: As Tolerated    Occupational orders: Activities of daily living Strengthening Fine motor    Frequency: 1 Week 1         Discharge Follow-up with PCP   As directed       Currently Documented PCP:    Georgina Chiu APRN    PCP Phone Number:    [phone]     Follow Up Details: one to two weeks--s/p cva with right hemiparesis         Discharge Follow-up with Specified Provider: [facility]; 2 Weeks   As directed      To: [facility]    Follow Up: 2 Weeks    Follow Up Details: followup of cva            Contact information for follow-up providers     BETH Boggs Follow up on 9/27/2021.    Why:  9:00 am  Contact information:  [facility]  [address] [address]  [phone]           Donya Looney APRN .    Specialty: Family Medicine  Why: one to two weeks--s/p cva with right hemiparesis  Contact information:  [address] [place] [address]  [phone]                   Contact information for after-discharge care     Home Medical Care     [facility] .    Service: Home Health Services  Contact information:  [address] [address]  [phone]                              TEST  RESULTS PENDING AT DISCHARGE       CODE STATUS  Code Status and Medical Interventions:   Ordered at: 08/27/21 1221     Code Status:    CPR     Medical Interventions (Level of Support Prior to Arrest):    Full       Garrett Sneed MD  [facility]  09/24/21  10:07 EDT    Please note that this discharge summary required less than 30 minutes to complete.

## 2021-09-24 NOTE — THERAPY DISCHARGE NOTE
Inpatient Rehabilitation - IRF Occupational Therapy Treatment Note/Discharge   Tre     Patient Name: Joycelyn Brown  : 1940  MRN: [id number]  Today's Date: 2021               Admit Date: 2021       ICD-10-CM ICD-9-CM   1. CVA (cerebral vascular accident) (CMS/Formerly [facility])  I63.9 434.91   2. Cerebrovascular accident (CVA) due to thrombosis of left middle cerebral artery (CMS/[facility]ly [facility])  I63.312 434.01     Patient Active Problem List   Diagnosis   • CVA (cerebral vascular accident) (CMS/Formerly [facility])     History reviewed. No pertinent past medical history.  No past surgical history on file.       IRF OT ASSESSMENT FLOWSHEET (last 12 hours)      IRF OT Evaluation and Treatment     Row Name 21 1400          OT Time and Intention    Document Type  discharge evaluation  -     Mode of Treatment  individual therapy;occupational therapy  -     Patient Effort  adequate  -     Row Name 21 1400          General Information    Patient/Family/Caregiver Comments/Observations  patient discharged home today with assist from family.  patient to receive  OT at discharge for continued RUE and ADL.  patient provided with foam squeeze ball and right ROM,positioning and sling for home use.   -     Existing Precautions/Restrictions  fall  -[facility] Name 21 1400          Bathing    [place] Level (Bathing)  maximum assist (25% patient effort)  -AH     Row Name 21 1400          Upper Body Dressing    [place] Level (Upper Body Dressing)  moderate assist (50-74% patient effort)  -[facility] Name 21 1400          Lower Body Dressing    [place] Level (Lower Body Dressing)  maximum assist (25% patient effort)  -AH     Row Name 21 1400          Grooming    [place] Level (Grooming)  moderate assist (50% patient effort);minimum assist (75% patient effort)  -[facility] Name 21 1400          Toileting    [place] Level (Toileting)  maximum assist (25% patient effort);dependent  Pt returned from [address]  08/27/19 3026 (less than 25% patient effort)  -     Row Name 09/24/21 1400          Self-Feeding    [place] Level (Self-Feeding)  supervision  -     Row Name 09/24/21 1400          Bathing Goal 1 (OT-IRF)    Progress/Outcomes (Bathing Goal 1, OT-IRF)  goal not met  -     Row Name 09/24/21 1400          UB Dressing Goal 1 (OT-IRF)    Progress/Outcomes (UB Dressing Goal 1, OT-IRF)  goal met  -     Row Name 09/24/21 1400          UB Dressing Goal 2 (OT-IRF)    Progress/Outcomes (UB Dressing Goal 2, OT-IRF)  goal partially met  -     Row Name 09/24/21 1400          Grooming Goal 1 (OT-IRF)    Progress/Outcomes (Grooming Goal 1, OT-IRF)  goal met  -     Row Name 09/24/21 1400          Grooming Goal 2 (OT-IRF)    Progress/Outcomes (Grooming Goal 2, OT-IRF)  goal met;goal partially met  -[facility] Name 09/24/21 1400          Toileting Goal 1 (OT-IRF)    Progress/Outcomes (Toileting Goal 1, OT-IRF)  goal not met  -       User Key  (r) = Recorded By, (t) = Taken By, (c) = Cosigned By    Initials Name Effective Dates    JESUS Maria Guadalupe Monge, OT 06/16/21 -              Occupational Therapy Education                 Title: PT OT SLP Therapies (Done)     Topic: Occupational Therapy (Done)     Point: ADL training (Done)     Description:   Instruct learner(s) on proper safety adaptation and remediation techniques during self care or transfers.   Instruct in proper use of assistive devices.              Learning Progress Summary           Patient Acceptance, E, VU by  at 9/24/2021 1441    Comment: RUE ROM, positioning, sling, foam squeeze ball    Acceptance, E,TB, VU by  at 9/24/2021 0317    Acceptance, E,TB, VU by DG at 9/18/2021 2225    Acceptance, E,TB, VU by DG at 9/18/2021 0014    Acceptance, E, VU,NR by  at 9/17/2021 1456    Acceptance, E,TB, VU by DG at 9/16/2021 2222    Acceptance, E,TB, VU by DG at 9/15/2021 2303    Acceptance, E,TB, VU by DG at 9/2/2021 2003    Acceptance, E,D, VU,NR by CJ at 9/2/2021 1456     Acceptance, E,TB, VU by DG at 9/1/2021 2256    Acceptance, E,D, VU,NR by CJ at 9/1/2021 1520    Acceptance, E,D, VU,NR by CJ at 8/31/2021 1541    Acceptance, E, VU,NR by BF at 8/30/2021 1507    Acceptance, E,TB, VU by DG at 8/28/2021 2040    Acceptance, E,D, VU,NR by AB at 8/28/2021 1410                   Point: Home exercise program (Done)     Description:   Instruct learner(s) on appropriate technique for monitoring, assisting and/or progressing therapeutic exercises/activities.              Learning Progress Summary           Patient Acceptance, E, VU by  at 9/24/2021 1441    Comment: RUE ROM, positioning, sling, foam squeeze ball    Acceptance, E,TB, VU by KF at 9/24/2021 0317    Acceptance, E,TB, VU by DG at 9/18/2021 2225    Acceptance, E,TB, VU by DG at 9/18/2021 0014    Acceptance, E,TB, VU by DG at 9/16/2021 2222    Acceptance, E,TB, VU by DG at 9/15/2021 2303    Acceptance, E,TB, VU by DG at 9/2/2021 2003    Acceptance, E,TB, VU by DG at 9/1/2021 2256    Acceptance, E,TB, VU by DG at 8/28/2021 2040                   Point: Precautions (Done)     Description:   Instruct learner(s) on prescribed precautions during self-care and functional transfers.              Learning Progress Summary           Patient Acceptance, E, VU by  at 9/24/2021 1441    Comment: RUE ROM, positioning, sling, foam squeeze ball    Acceptance, E,TB, VU by KF at 9/24/2021 0317    Acceptance, E,TB, VU by DG at 9/18/2021 2225    Acceptance, E,TB, VU by DG at 9/18/2021 0014    Acceptance, E, VU,NR by  at 9/17/2021 1456    Acceptance, E,TB, VU by DG at 9/16/2021 2222    Acceptance, E,TB, VU by DG at 9/15/2021 2303    Acceptance, E,TB, VU by DG at 9/2/2021 2003    Acceptance, E,D, VU,NR by CJ at 9/2/2021 1458    Acceptance, E,TB, VU by BEVERLY at 9/1/2021 2256    Acceptance, E,D, VU,NR by TONJA at 9/1/2021 1520    Acceptance, E,D, VU,NR by TONJA at 8/31/2021 1541    Acceptance, E, VU,NR by  at 8/30/2021 1507    Acceptance, E,TB, VU by BEVERLY at  8/28/2021 2040    Acceptance, E,D, VU,NR by AB at 8/28/2021 1410                               User Key     Initials Effective Dates Name Provider Type Discipline    AB 06/16/21 -  Yazmin Chaves, OT Occupational Therapist OT    DG 06/16/21 -  Melissa Swift, RN Registered Nurse Nurse     06/16/21 -  Debbie Mehta, OT Occupational Therapist OT    CJ 06/16/21 -  Lucrecia Andrews, OT Occupational Therapist OT     06/16/21 -  Maria Guadalupe Monge, OT Occupational Therapist OT    KF 09/01/20 -  Grace Allen RN Registered Nurse Nurse                OT Recommendation and Plan  Planned Therapy Interventions (OT): activity tolerance training, adaptive equipment training, BADL retraining, functional balance retraining, neuromuscular control/coordination retraining, passive ROM/stretching, patient/caregiver education/training, ROM/therapeutic exercise, strengthening exercise, transfer/mobility retraining          OT IRF GOALS     Row Name 09/24/21 1400 09/13/21 1209          Bathing Goal 1 (OT-IRF)    Progress/Outcomes (Bathing Goal 1, OT-IRF)  goal not met  -  goal partially met;goal ongoing  -AH        UB Dressing Goal 1 (OT-IRF)    Progress/Outcomes (UB Dressing Goal 1, OT-IRF)  goal met  -  goal met  -AH        UB Dressing Goal 2 (OT-IRF)    Progress/Outcomes (UB Dressing Goal 2, OT-IRF)  goal partially met  -  goal partially met;goal ongoing  -AH        Grooming Goal 1 (OT-IRF)    Progress/Outcomes (Grooming Goal 1, OT-IRF)  goal met  -AH  goal met  -AH        Grooming Goal 2 (OT-IRF)    Progress/Outcomes (Grooming Goal 2, OT-IRF)  goal met;goal partially met  -  goal partially met;goal ongoing  -AH        Toileting Goal 1 (OT-IRF)    Progress/Outcomes (Toileting Goal 1, OT-IRF)  goal not met  -  goal partially met;goal ongoing  -AH       User Key  (r) = Recorded By, (t) = Taken By, (c) = Cosigned By    Initials Name Provider Type     Maria Guadalupe Monge, OT Occupational Therapist               Time Calculation:   Time Calculation- OT     Row Name 09/24/21 1442             Time Calculation-     OT Start Time  1130  -      OT Stop Time  1145  -      OT Time Calculation (min)  15 min  -        User Key  (r) = Recorded By, (t) = Taken By, (c) = Cosigned By    Initials Name Provider Type     Maria Guadalupe Monge OT Occupational Therapist          Therapy Charges for Today     Code Description Service Date Service Provider Modifiers Qty    [id number]  OT THERAPEUTIC ACT EA 15 MIN 9/23/2021 Maria Guadalupe Monge OT GO, KX 2    [id number]  OT NEUROMUSC RE EDUCATION EA 15 MIN 9/23/2021 Maria Guadalupe Monge OT GO, KX 4    [id number]  OT SELF CARE/MGMT/TRAIN EA 15 MIN 9/24/2021 Maria Guadalupe Monge OT GO, KX 1                    Maria Guadalupe Monge OT  9/24/2021

## 2021-09-24 NOTE — PROGRESS NOTES
Occupational Therapy: Individual: 15 minutes.    Physical Therapy:    Speech Language Pathology:    Signed by: Tiff Monge, Occupational Therapist

## 2021-09-24 NOTE — SIGNIFICANT NOTE
09/24/21 1040   Plan   Plan Contacted [facility] [phone] per Janae about discharge and scheduling W/C van transportation to pt's home in [facility].  Pt has Medicaid [employer] insurance which does not pay for [facility], [facility] need [facility] be [facility] for permission to transport.  Janae transferred SS to [place] to schedule transportation home via W/C accessible van: pt will be in a 18 inch W/C.   Faxed verification of discharge to [facility] [phone].  Pt is scheduled for pick-up at 12:30 pm and [facility]  will call rehab nurses's station when they arrive in the parking lot.  Cost is estimated to be $96.  Attempted to contact daughter Lissette [phone] without success.  Contacted daughter Missy [phone] about cost of transport who states family will pay [facility] when pt arrives home.  Daughters Missy, Lissette, and Alvaro will be waiting for pt at home.  Contacted  Pharmacy ext. [phone] per Sherice who states pt's co-pays are $45.71; daughter will contact pharmacy and pay for meds via telephone.  Daughter Missy says she talked to pt's PCP BETH Shanks who will do a virtual visit with pt today if she arrives home before 3:30 pm or on Monday 9-27-21 if she arrives home after 3:30 pm.  Contacted [facility] [phone] per Flora about discharge and virtual visit today or Monday 9-27-21 with PCP per daughter.   has pt scheduled for start of care on 9-28-21 as long as she has a visit with PCP.  Daughter Missy aware of plans.  Faxed ambulatory referral for home health with face to face to  [phone].   Patient/Family in Agreement with Plan yes   Final Discharge Disposition Code 06 - home with home health care

## 2021-09-24 NOTE — THERAPY DISCHARGE NOTE
Inpatient Rehabilitation - Physical Therapy Treatment Note/Discharge   Tre     Patient Name: Joycelyn Brown  : 1940  MRN: [id number]  Today's Date: 2021                Admit Date: 2021    Visit Dx:    ICD-10-CM ICD-9-CM   1. CVA (cerebral vascular accident) (CMS/HCC)  I63.9 434.91   2. Cerebrovascular accident (CVA) due to thrombosis of left middle cerebral artery (CMS/HCC)  I63.312 434.01     Patient Active Problem List   Diagnosis   • CVA (cerebral vascular accident) (CMS/HCC)     History reviewed. No pertinent past medical history.  No past surgical history on file.       PT ASSESSMENT (last 12 hours)      IRF PT Evaluation and Treatment     Row Name 21 155          PT Time and Intention    Document Type  discharge evaluation  -RF     Mode of Treatment  physical therapy  -RF     Patient/Family/Caregiver Comments/Observations  D/C planning performed with pt this date. Education provided on home safety techniques and importance of compliance with HEP for conitnued LE strengthening and improvements. Pt verbalized understanding.   -RF     Row Name 21 155          Cognition/Psychosocial    Affect/Mental Status (Cognitive)  WFL  -RF     Orientation Status (Cognition)  oriented to;person;place  -RF     Follows Commands (Cognition)  WFL  -RF     Row Name 21 155          Bed Mobility Goal 1 (PT-IRF)    Progress/Outcomes (Bed Mobility Goal 1, PT-IRF)  goal not met  -RF     Row Name 21          Transfer Goal 1 (PT-IRF)    Progress/Outcomes (Transfer Goal 1, PT-IRF)  goal not met  -RF     Row Name 21          Gait/Walking Locomotion Goal 1 (PT-IRF)    Progress/Outcomes (Gait/Walking Locomotion Goal 1, PT-IRF)  goal partially met  -RF     Row Name 21          Stairs Goal 1 (PT-IRF)    Progress/Outcomes (Stairs Goal 1, PT-IRF)  goal not met  -RF     Row Name 21          Positioning and Restraints    Pre-Treatment Position  in bed  -RF     In  Bed  supine;call light within reach;encouraged to call for assist  -RF       User Key  (r) = Recorded By, (t) = Taken By, (c) = Cosigned By    Initials Name Provider Type    Malinda Wang PTA Physical Therapy Assistant          Physical Therapy Education                 Title: PT OT SLP Therapies (Done)     Topic: Physical Therapy (Resolved)     Point: Mobility training (Resolved)     Learning Progress Summary           Patient Eager, E, VU by  at 9/24/2021 1447    Comment: patient encouraged to continue to partcipate in self care and activities daily while up.    Acceptance, E, VU by  at 9/24/2021 1441    Comment: RUE ROM, positioning, sling, foam squeeze ball    Acceptance, E,TB, VU by KF at 9/24/2021 0317    Acceptance, E,TB, VU by RF at 9/23/2021 1526    Acceptance, E,TB, VU by RF at 9/22/2021 1515    Acceptance, E,TB, VU by RF at 9/21/2021 1547    Acceptance, E,TB, VU by RF at 9/20/2021 1558    Acceptance, E,TB, VU by DG at 9/18/2021 2225    Acceptance, E,TB, VU by DG at 9/18/2021 0014    Acceptance, E,TB, VU by DG at 9/16/2021 2222    Acceptance, E,TB, VU by DG at 9/15/2021 2303    Acceptance, E,TB, VU by RF at 9/15/2021 1603    Acceptance, E,TB, VU by RF at 9/14/2021 1614    Acceptance, E, VU,NR by LB at 9/13/2021 1121    Acceptance, E,TB, VU by RF at 9/10/2021 1556    Acceptance, E,TB, VU by RF at 9/9/2021 1551    Acceptance, E,TB, VU by RF at 9/8/2021 1602    Acceptance, E,TB, VU by RF at 9/7/2021 1605    Acceptance, E,TB, VU by RF at 9/6/2021 1557    Acceptance, E,TB, VU by RF at 9/3/2021 1532    Acceptance, E,TB, VU by DG at 9/2/2021 2003    Acceptance, E,TB, VU by RF at 9/2/2021 1610    Acceptance, E,TB, VU by DG at 9/1/2021 2256    Acceptance, E,TB, VU by RF at 9/1/2021 1548    Acceptance, E,TB, VU by RF at 8/31/2021 1423    Acceptance, E,TB, VU by RF at 8/30/2021 1548                   Point: Home exercise program (Resolved)     Learning Progress Summary           Patient Eager, E, VU by   at 9/24/2021 1447    Comment: patient encouraged to continue to partcipate in self care and activities daily while up.    Acceptance, E, VU by  at 9/24/2021 1441    Comment: RUE ROM, positioning, sling, foam squeeze ball    Acceptance, E,TB, VU by KF at 9/24/2021 0317    Acceptance, E,TB, VU by RF at 9/23/2021 1526    Acceptance, E,TB, VU by RF at 9/22/2021 1515    Acceptance, E,TB, VU by RF at 9/21/2021 1547    Acceptance, E,TB, VU by RF at 9/20/2021 1558    Acceptance, E,TB, VU by DG at 9/18/2021 2225    Acceptance, E,TB, VU by DG at 9/18/2021 0014    Acceptance, E,TB, VU by DG at 9/16/2021 2222    Acceptance, E,TB, VU by DG at 9/15/2021 2303    Acceptance, E,TB, VU by RF at 9/15/2021 1603    Acceptance, E,TB, VU by RF at 9/14/2021 1614    Acceptance, E, VU,NR by LB at 9/13/2021 1121    Acceptance, E,TB, VU by RF at 9/10/2021 1556    Acceptance, E,TB, VU by RF at 9/9/2021 1551    Acceptance, E,TB, VU by RF at 9/8/2021 1602    Acceptance, E,TB, VU by RF at 9/7/2021 1605    Acceptance, E,TB, VU by RF at 9/6/2021 1557    Acceptance, E,TB, VU by RF at 9/3/2021 1532    Acceptance, E,TB, VU by DG at 9/2/2021 2003    Acceptance, E,TB, VU by RF at 9/2/2021 1610    Acceptance, E,TB, VU by DG at 9/1/2021 2256    Acceptance, E,TB, VU by RF at 9/1/2021 1548    Acceptance, E,TB, VU by RF at 8/31/2021 1423    Acceptance, E,TB, VU by RF at 8/30/2021 1548                   Point: Body mechanics (Resolved)     Learning Progress Summary           Patient Eager, E, VU by  at 9/24/2021 1447    Comment: patient encouraged to continue to partcipate in self care and activities daily while up.    Acceptance, E, VU by  at 9/24/2021 1441    Comment: RUE ROM, positioning, sling, foam squeeze ball    Acceptance, E,TB, VU by  at 9/24/2021 0317    Acceptance, E,TB, VU by RF at 9/23/2021 1526    Acceptance, E,TB, VU by RF at 9/22/2021 1515    Acceptance, E,TB, VU by RF at 9/21/2021 1547    Acceptance, E,TB, VU by RF at 9/20/2021 1558     Acceptance, E,TB, VU by DG at 9/18/2021 2225    Acceptance, E,TB, VU by DG at 9/18/2021 0014    Acceptance, E,TB, VU by DG at 9/16/2021 2222    Acceptance, E,TB, VU by DG at 9/15/2021 2303    Acceptance, E,TB, VU by RF at 9/15/2021 1603    Acceptance, E,TB, VU by RF at 9/14/2021 1614    Acceptance, E, VU,NR by LB at 9/13/2021 1121    Acceptance, E,TB, VU by RF at 9/10/2021 1556    Acceptance, E,TB, VU by RF at 9/9/2021 1551    Acceptance, E,TB, VU by RF at 9/8/2021 1602    Acceptance, E,TB, VU by RF at 9/7/2021 1605    Acceptance, E,TB, VU by RF at 9/6/2021 1557    Acceptance, E,TB, VU by RF at 9/3/2021 1532    Acceptance, E,TB, VU by DG at 9/2/2021 2003    Acceptance, E,TB, VU by RF at 9/2/2021 1610    Acceptance, E,TB, VU by DG at 9/1/2021 2256    Acceptance, E,TB, VU by RF at 9/1/2021 1548    Acceptance, E,TB, VU by RF at 8/31/2021 1423    Acceptance, E,TB, VU by RF at 8/30/2021 1548                   Point: Precautions (Resolved)     Learning Progress Summary           Patient Eager, E, VU by  at 9/24/2021 1447    Comment: patient encouraged to continue to partcipate in self care and activities daily while up.    Acceptance, E, VU by  at 9/24/2021 1441    Comment: RUE ROM, positioning, sling, foam squeeze ball    Acceptance, E,TB, VU by KF at 9/24/2021 0317    Acceptance, E,TB, VU by RF at 9/23/2021 1526    Acceptance, E,TB, VU by RF at 9/22/2021 1515    Acceptance, E,TB, VU by RF at 9/21/2021 1547    Acceptance, E,TB, VU by RF at 9/20/2021 1558    Acceptance, E,TB, VU by DG at 9/18/2021 2225    Acceptance, E,TB, VU by DG at 9/18/2021 0014    Acceptance, E,TB, VU by DG at 9/16/2021 2222    Acceptance, E,TB, VU by DG at 9/15/2021 2303    Acceptance, E,TB, VU by RF at 9/15/2021 1603    Acceptance, E,TB, VU by RF at 9/14/2021 1614    Acceptance, E, VU,NR by LB at 9/13/2021 1121    Acceptance, E,TB, VU by RF at 9/10/2021 1556    Acceptance, E,TB, VU by RF at 9/9/2021 1551    Acceptance, E,TB, VU by RF at  9/8/2021 1602    Acceptance, E,TB, VU by RF at 9/7/2021 1605    Acceptance, E,TB, VU by RF at 9/6/2021 1557    Acceptance, E,TB, VU by RF at 9/3/2021 1532    Acceptance, E,TB, VU by DG at 9/2/2021 2003    Acceptance, E,TB, VU by RF at 9/2/2021 1610    Acceptance, E,TB, VU by DG at 9/1/2021 2256    Acceptance, E,TB, VU by RF at 9/1/2021 1548    Acceptance, E,TB, VU by RF at 8/31/2021 1423    Acceptance, E,TB, VU by RF at 8/30/2021 1548                               User Key     Initials Effective Dates Name Provider Type Discipline     06/16/21 -  Melissa Swift, RN Registered Nurse Nurse     06/16/21 -  Anju Reis, PT Physical Therapist PT     06/16/21 -  Maria Guadalupe Monge, OT Occupational Therapist OT     06/16/21 -  Malinda Alicea PTA Physical Therapy Assistant PT     09/01/20 -  Grace Allen RN Registered Nurse Nurse                PT Recommendation and Plan        Daily Progress Summary (PT)  Functional Goal Overall Progress (PT): progressing toward functional goals as expected  Daily Progress Summary (PT): Improvements noted in gait training and functional mobility this date. Pt continually requires increased assistance for transferring. Improvements in RLE strength noted as well.   Impairments Still Limiting Function (PT): sensation decreased, strength decreased, impaired balance, coordination impaired, impaired functional activity tolerance, motor control impaired, postural control impaired, pain  Recommendations (PT): Continue per current POC.          Time Calculation:   PT Charges     Row Name 09/24/21 1602             Time Calculation    PT Received On  09/24/21  -RF         Time Calculation- PT    Total Timed Code Minutes- PT  15 minute(s)  -RF        User Key  (r) = Recorded By, (t) = Taken By, (c) = Cosigned By    Initials Name Provider Type    RF Malinda Alicea PTA Physical Therapy Assistant          Therapy Charges for Today     Code Description Service Date Service  Provider Modifiers Qty    [id number]  GAIT TRAINING EA 15 MIN 9/23/2021 Malinda Alicea, PTA GP, KX 3    [id number]  PT NEUROMUSC RE EDUCATION EA 15 MIN 9/23/2021 Malinda Alicea, PTA GP, KX 1    [id number]  PT THER PROC EA 15 MIN 9/23/2021 Malinda Alicea, PTA GP, KX 2    [id number]  PT THERAPEUTIC ACT EA 15 MIN 9/24/2021 Malinda Alicea, PTA GP, KX 1                    Malinda Alicea PTA  9/24/2021

## 2021-09-24 NOTE — PROGRESS NOTES
Rehabilitation Nursing  Inpatient Rehabilitation Plan of Care Note    Plan of Care  Copy from [place]    Pain Management (Active)  Current Status (8/27/2021 10:38:00 AM): potential for pain  Weekly Goal: No pain this week  Discharge Goal: No pain    Safety    Potential for Injury (Active)  Current Status (8/27/2021 10:38:00 AM): At risk for injury  Weekly Goal: No injury this week  Discharge Goal: No injuriesC    Signed by: Babs Medrano Nurse

## 2021-09-24 NOTE — PROGRESS NOTES
Patient Assessment Instrument  Quality Indicators - Discharge    Section GG. Self-Care Performance      Section GG. Mobility Performance     Roll Left and Right: [place] does less than half the effort. [place] lifts, holds  or supports trunk or limbs but provides less than half the effort.   Sit to Lying: [place] does more than half the effort. [place] lifts or holds  trunk or limbs and provides more than half the effort.   Lying to Sitting on Side of Bed: [place] does more than half the effort. [place]  lifts or holds trunk or limbs and provides more than half the effort.   Sit to Stand: [place] does less than half the effort. [place] lifts, holds or  supports trunk or limbs but provides less than half the effort.   Chair/Bed to Chair Transfer: [place] does more than half the effort. [place]  lifts or holds trunk or limbs and provides more than half the effort.   Toilet Transfer [place] does more than half the effort. [place] lifts or holds  trunk or limbs and provides more than half the effort.   Car Transfer: Not attempted due to medical or safety concerns.   Walk 10 Feet:   [place] does less than half the effort. [place] lifts, holds or  supports trunk or limbs but provides less than half the effort.  Walk 50 Feet with 2 Turns:   Not attempted due to medical or safety concerns.  Walk 150 Feet:   Not attempted due to medical or safety concerns.  Walking 10 Feet on Uneven Surfaces:   Not attempted due to medical or safety  concerns.  1 Step Over Curb or Up/Down Stair:   Not attempted due to medical or safety  concerns.  Picking up an Object:   Not attempted due to medical or safety concerns. Uses  Wheelchair and/or Scooter: Yes  Wheel 50 Feet with Two Turns: [place] does all of the effort. Patient does none  of the effort to complete the activity. Or, the assistance of 2 or more helpers  is required for the patient to complete the activity.  Type of Wheelchair or Scooter Used: Manual  Wheel 150 Feet: [place] does all of the  effort. Patient does none of the effort  to complete the activity. Or, the assistance of 2 or more helpers is required  for the patient to complete the activity.  Type of Wheelchair or Scooter Used: Manual    Section J. Health Conditions Discharge      Section M. Skin Conditions Discharge      . Current Number of Unhealed Pressure Ulcers      Section N. Medication    Signed by: Malinda Alicea PTA

## 2021-09-24 NOTE — PROGRESS NOTES
Occupational Therapy:    Physical Therapy: Individual: 15 minutes.    Speech Language Pathology:    Signed by: Malinda Alicea PTA

## 2021-09-24 NOTE — PROGRESS NOTES
Patient Assessment Instrument  Quality Indicators - Discharge    Section GG. Self-Care Performance     Eating: [place] provides verbal cues and/or touching/steadying and/or contact  guard assistance as patient completes activity.   Oral Hygiene: [place] does less than half the effort. [place] lifts, holds or  supports trunk or limbs but provides less than half the effort.   Toileting Hygiene: : [place] does all of the effort. Patient does none of the  effort to complete the activity. Or, the assistance of 2 or more helpers is  required for the patient to complete the activity.   Shower/Bathe Self: [place] does more than half the effort. [place] lifts or holds  trunk or limbs and provides more than half the effort.   Upper Body Dressing: [place] does less than half the effort. [place] lifts, holds  or supports trunk or limbs but provides less than half the effort.   Lower Body Dressing: [place] does more than half the effort. [place] lifts or  holds trunk or limbs and provides more than half the effort.   Putting On/Taking Off Footwear: [place] does all of the effort. Patient does  none of the effort to complete the activity. Or, the assistance of 2 or more  helpers is required for the patient to complete the activity.    Section GG. Mobility Performance      Section J. Health Conditions Discharge      Section M. Skin Conditions Discharge      . Current Number of Unhealed Pressure Ulcers      Section N. Medication    Signed by: Tiff Monge, Occupational Therapist

## 2021-09-24 NOTE — SIGNIFICANT NOTE
09/24/21 1110   Plan   Plan Spoke to pt about discharge, [facility] W/C van scheduled to pick her up around 12:30 pm, virtual visit with PCP today if she arrives home before 3:30 pm or Monday 9-27-21 if she arrives home after 3:30 pm, [facility] to provide start of care on 9-28-21 as long as she has a visit with PCP, hospital bed and BSC to be delivered by Anand to her home today before 12:00 pm.  [facility] delivered W/C to pt's room today.  Pt is returning home with family providing 24 hour assistance.   Patient/Family in Agreement with Plan yes

## 2021-09-25 NOTE — PROGRESS NOTES
Patient Assessment Instrument  Quality Indicators - Discharge    Section GG. Self-Care Performance      Section GG. Mobility Performance      Section J. Health Conditions Discharge  Fall(s) Since Admission:  No    Section M. Skin Conditions Discharge      . Current Number of Unhealed Pressure Ulcers      Section N. Medication    Medication Intervention: N/A - There were no potential clinically significant  medication issues identified since admission or patient is not taking any  medications.    Signed by: Maylin Jauregui, Supervisor

## 2021-09-27 NOTE — PROGRESS NOTES
[facility]PS CMG Coordinator  Inpatient Rehabilitation Discharge    Mode of Locomotion: Wheelchair.    Discharge Against Medical Advice:  No.  Discharge Information  Patient Discharged Alive:  Yes  Discharge Destination/Living Setting: Home with Home Health Services  Diagnosis for Interruption/Death:    Impairment Group: Stroke: 01.2 Right Body Involvement (Left Brain)    Comorbidities: Rank Code      Description      1    G81.91    Hemiplegia, unspecified affecting right                 dominant side  2    R47.1     Dysarthria and anarthria  3    R13.10    Dysphagia, unspecified  4    I65.02    Occlusion and stenosis of left vertebral artery  5    I10       Essential (primary) hypertension  6    E78.5     Hyperlipidemia, unspecified  7    E03.9     Hypothyroidism, unspecified  8    K21.9     Gastro-esophageal reflux disease without                 esophagitis  9    K59.00    Constipation, unspecified  10   F32.9     Major depressive disorder, single episode,                 unspecified  11   Z87.440   Personal history of urinary (tract) infections  12   Z87.01    Personal history of pneumonia (recurrent)    Complications:      PAT Bladder Accidents: 0  - Accidents.  Bladder Score = 1.  Five (5) or more  bladder accidents.  PAT Bowel Accident: 0  - Accidents.  Bowel Score = 2.  Four (4) bowel accidents.    Signed by: Maria Guadalupe Bowling Nurse